# Patient Record
Sex: MALE | Race: WHITE | NOT HISPANIC OR LATINO | URBAN - NONMETROPOLITAN AREA
[De-identification: names, ages, dates, MRNs, and addresses within clinical notes are randomized per-mention and may not be internally consistent; named-entity substitution may affect disease eponyms.]

---

## 2017-01-11 RX ORDER — LISINOPRIL 10 MG/1
TABLET ORAL
Qty: 30 TABLET | Refills: 0 | Status: SHIPPED | OUTPATIENT
Start: 2017-01-11 | End: 2017-03-06 | Stop reason: SDUPTHER

## 2017-01-11 RX ORDER — ERGOCALCIFEROL 1.25 MG/1
CAPSULE ORAL
Qty: 4 CAPSULE | Refills: 0 | Status: SHIPPED | OUTPATIENT
Start: 2017-01-11 | End: 2017-03-06 | Stop reason: SDUPTHER

## 2017-03-06 RX ORDER — LISINOPRIL 10 MG/1
TABLET ORAL
Qty: 30 TABLET | Refills: 0 | Status: SHIPPED | OUTPATIENT
Start: 2017-03-06 | End: 2017-05-01

## 2017-03-06 RX ORDER — ERGOCALCIFEROL 1.25 MG/1
CAPSULE ORAL
Qty: 4 CAPSULE | Refills: 0 | Status: SHIPPED | OUTPATIENT
Start: 2017-03-06 | End: 2017-05-01 | Stop reason: SDUPTHER

## 2017-03-30 DIAGNOSIS — I10 ESSENTIAL HYPERTENSION: ICD-10-CM

## 2017-03-30 RX ORDER — LISINOPRIL 5 MG/1
5 TABLET ORAL DAILY
Qty: 30 TABLET | Refills: 1 | Status: SHIPPED | OUTPATIENT
Start: 2017-03-30 | End: 2017-05-01 | Stop reason: SDUPTHER

## 2017-03-30 RX ORDER — SIMVASTATIN 40 MG
40 TABLET ORAL NIGHTLY
Qty: 30 TABLET | Refills: 1 | Status: SHIPPED | OUTPATIENT
Start: 2017-03-30 | End: 2017-05-01 | Stop reason: SDUPTHER

## 2017-04-07 DIAGNOSIS — I10 ESSENTIAL HYPERTENSION: ICD-10-CM

## 2017-04-10 RX ORDER — ASPIRIN 325 MG
TABLET, DELAYED RELEASE (ENTERIC COATED) ORAL
Qty: 30 TABLET | Refills: 0 | Status: SHIPPED | OUTPATIENT
Start: 2017-04-10 | End: 2017-05-01 | Stop reason: SDUPTHER

## 2017-04-10 RX ORDER — ATENOLOL 25 MG/1
TABLET ORAL
Qty: 15 TABLET | Refills: 0 | Status: SHIPPED | OUTPATIENT
Start: 2017-04-10 | End: 2017-05-01 | Stop reason: SDUPTHER

## 2017-05-01 ENCOUNTER — OFFICE VISIT (OUTPATIENT)
Dept: FAMILY MEDICINE CLINIC | Facility: CLINIC | Age: 47
End: 2017-05-01

## 2017-05-01 VITALS
BODY MASS INDEX: 31.64 KG/M2 | DIASTOLIC BLOOD PRESSURE: 80 MMHG | HEIGHT: 70 IN | WEIGHT: 221 LBS | HEART RATE: 103 BPM | SYSTOLIC BLOOD PRESSURE: 122 MMHG | TEMPERATURE: 97.4 F | OXYGEN SATURATION: 98 %

## 2017-05-01 DIAGNOSIS — E78.2 MIXED HYPERLIPIDEMIA: ICD-10-CM

## 2017-05-01 DIAGNOSIS — F32.A DEPRESSION, UNSPECIFIED DEPRESSION TYPE: ICD-10-CM

## 2017-05-01 DIAGNOSIS — H61.20 EXCESSIVE CERUMEN IN EAR CANAL, UNSPECIFIED LATERALITY: ICD-10-CM

## 2017-05-01 DIAGNOSIS — Z29.9 PREVENTIVE MEASURE: ICD-10-CM

## 2017-05-01 DIAGNOSIS — E53.8 VITAMIN B 12 DEFICIENCY: ICD-10-CM

## 2017-05-01 DIAGNOSIS — Z13.9 SCREENING: ICD-10-CM

## 2017-05-01 DIAGNOSIS — E55.9 VITAMIN D DEFICIENCY DISEASE: ICD-10-CM

## 2017-05-01 DIAGNOSIS — I10 ESSENTIAL HYPERTENSION: Primary | ICD-10-CM

## 2017-05-01 LAB
25(OH)D3 SERPL-MCNC: 36 NG/ML
ALBUMIN SERPL-MCNC: 4.4 G/DL (ref 3.5–5)
ALBUMIN UR-MCNC: 7.6 MG/L
ALBUMIN/GLOB SERPL: 1.8 G/DL (ref 1.5–2.5)
ALP SERPL-CCNC: 87 U/L (ref 40–129)
ALT SERPL W P-5'-P-CCNC: 23 U/L (ref 10–44)
ANION GAP SERPL CALCULATED.3IONS-SCNC: 6.8 MMOL/L (ref 3.6–11.2)
AST SERPL-CCNC: 24 U/L (ref 10–34)
BILIRUB SERPL-MCNC: 0.2 MG/DL (ref 0.2–1.8)
BUN BLD-MCNC: 13 MG/DL (ref 7–21)
BUN/CREAT SERPL: 15.9 (ref 7–25)
CALCIUM SPEC-SCNC: 9.5 MG/DL (ref 7.7–10)
CHLORIDE SERPL-SCNC: 106 MMOL/L (ref 99–112)
CHOLEST SERPL-MCNC: 162 MG/DL (ref 0–200)
CO2 SERPL-SCNC: 28.2 MMOL/L (ref 24.3–31.9)
CREAT BLD-MCNC: 0.82 MG/DL (ref 0.43–1.29)
GFR SERPL CREATININE-BSD FRML MDRD: 100 ML/MIN/1.73
GLOBULIN UR ELPH-MCNC: 2.4 GM/DL
GLUCOSE BLD-MCNC: 113 MG/DL (ref 70–110)
HBA1C MFR BLD: 5.8 % (ref 4.5–5.7)
HDLC SERPL-MCNC: 42 MG/DL (ref 60–100)
LDLC SERPL CALC-MCNC: 96 MG/DL (ref 0–100)
LDLC/HDLC SERPL: 2.3 {RATIO}
OSMOLALITY SERPL CALC.SUM OF ELEC: 282.2 MOSM/KG (ref 273–305)
POTASSIUM BLD-SCNC: 4.1 MMOL/L (ref 3.5–5.3)
PROT SERPL-MCNC: 6.8 G/DL (ref 6–8)
SODIUM BLD-SCNC: 141 MMOL/L (ref 135–153)
TRIGL SERPL-MCNC: 118 MG/DL (ref 0–150)
TSH SERPL DL<=0.05 MIU/L-ACNC: 1.58 MIU/ML (ref 0.55–4.78)
VIT B12 BLD-MCNC: 292 PG/ML (ref 211–911)
VLDLC SERPL-MCNC: 23.6 MG/DL

## 2017-05-01 PROCEDURE — 99214 OFFICE O/P EST MOD 30 MIN: CPT | Performed by: NURSE PRACTITIONER

## 2017-05-01 PROCEDURE — 82306 VITAMIN D 25 HYDROXY: CPT | Performed by: NURSE PRACTITIONER

## 2017-05-01 PROCEDURE — 83036 HEMOGLOBIN GLYCOSYLATED A1C: CPT | Performed by: NURSE PRACTITIONER

## 2017-05-01 PROCEDURE — 69210 REMOVE IMPACTED EAR WAX UNI: CPT | Performed by: NURSE PRACTITIONER

## 2017-05-01 PROCEDURE — G0008 ADMIN INFLUENZA VIRUS VAC: HCPCS | Performed by: NURSE PRACTITIONER

## 2017-05-01 PROCEDURE — 82607 VITAMIN B-12: CPT | Performed by: NURSE PRACTITIONER

## 2017-05-01 PROCEDURE — 80053 COMPREHEN METABOLIC PANEL: CPT | Performed by: NURSE PRACTITIONER

## 2017-05-01 PROCEDURE — 90715 TDAP VACCINE 7 YRS/> IM: CPT | Performed by: NURSE PRACTITIONER

## 2017-05-01 PROCEDURE — 36415 COLL VENOUS BLD VENIPUNCTURE: CPT | Performed by: NURSE PRACTITIONER

## 2017-05-01 PROCEDURE — 84443 ASSAY THYROID STIM HORMONE: CPT | Performed by: NURSE PRACTITIONER

## 2017-05-01 PROCEDURE — 80061 LIPID PANEL: CPT | Performed by: NURSE PRACTITIONER

## 2017-05-01 PROCEDURE — 82043 UR ALBUMIN QUANTITATIVE: CPT | Performed by: NURSE PRACTITIONER

## 2017-05-01 RX ORDER — ERGOCALCIFEROL 1.25 MG/1
50000 CAPSULE ORAL
Qty: 4 CAPSULE | Refills: 5 | Status: SHIPPED | OUTPATIENT
Start: 2017-05-01 | End: 2017-11-04 | Stop reason: SDUPTHER

## 2017-05-01 RX ORDER — LISINOPRIL 5 MG/1
5 TABLET ORAL DAILY
Qty: 30 TABLET | Refills: 5 | Status: SHIPPED | OUTPATIENT
Start: 2017-05-01 | End: 2017-11-06 | Stop reason: SDUPTHER

## 2017-05-01 RX ORDER — ATENOLOL 25 MG/1
12.5 TABLET ORAL DAILY
Qty: 15 TABLET | Refills: 5 | Status: SHIPPED | OUTPATIENT
Start: 2017-05-01 | End: 2017-11-06 | Stop reason: SDUPTHER

## 2017-05-01 RX ORDER — SIMVASTATIN 40 MG
40 TABLET ORAL NIGHTLY
Qty: 30 TABLET | Refills: 5 | Status: SHIPPED | OUTPATIENT
Start: 2017-05-01 | End: 2017-11-06 | Stop reason: SDUPTHER

## 2017-05-01 RX ORDER — ASPIRIN 325 MG
325 TABLET, DELAYED RELEASE (ENTERIC COATED) ORAL DAILY
Qty: 30 TABLET | Refills: 5 | Status: SHIPPED | OUTPATIENT
Start: 2017-05-01 | End: 2017-11-04 | Stop reason: SDUPTHER

## 2017-09-07 DIAGNOSIS — E11.9 DIABETES MELLITUS WITHOUT COMPLICATION (HCC): Primary | ICD-10-CM

## 2017-11-06 ENCOUNTER — OFFICE VISIT (OUTPATIENT)
Dept: FAMILY MEDICINE CLINIC | Facility: CLINIC | Age: 47
End: 2017-11-06

## 2017-11-06 VITALS
WEIGHT: 220 LBS | BODY MASS INDEX: 31.5 KG/M2 | SYSTOLIC BLOOD PRESSURE: 130 MMHG | HEIGHT: 70 IN | TEMPERATURE: 98.1 F | DIASTOLIC BLOOD PRESSURE: 90 MMHG | HEART RATE: 124 BPM | OXYGEN SATURATION: 96 %

## 2017-11-06 DIAGNOSIS — F41.9 ANXIETY DISORDER, UNSPECIFIED TYPE: ICD-10-CM

## 2017-11-06 DIAGNOSIS — I10 ESSENTIAL HYPERTENSION: ICD-10-CM

## 2017-11-06 DIAGNOSIS — F20.0 PARANOID SCHIZOPHRENIA (HCC): ICD-10-CM

## 2017-11-06 DIAGNOSIS — J06.9 ACUTE URI: ICD-10-CM

## 2017-11-06 DIAGNOSIS — E11.9 TYPE 2 DIABETES MELLITUS WITHOUT COMPLICATION, WITHOUT LONG-TERM CURRENT USE OF INSULIN (HCC): Primary | ICD-10-CM

## 2017-11-06 DIAGNOSIS — E55.9 VITAMIN D DEFICIENCY DISEASE: ICD-10-CM

## 2017-11-06 DIAGNOSIS — E78.2 MIXED HYPERLIPIDEMIA: ICD-10-CM

## 2017-11-06 DIAGNOSIS — F32.A DEPRESSION, UNSPECIFIED DEPRESSION TYPE: ICD-10-CM

## 2017-11-06 LAB
25(OH)D3 SERPL-MCNC: 70 NG/ML
ALBUMIN SERPL-MCNC: 4.9 G/DL (ref 3.5–5)
ALBUMIN/GLOB SERPL: 1.6 G/DL (ref 1.5–2.5)
ALP SERPL-CCNC: 120 U/L (ref 40–129)
ALT SERPL W P-5'-P-CCNC: 18 U/L (ref 10–44)
ANION GAP SERPL CALCULATED.3IONS-SCNC: 8.5 MMOL/L (ref 3.6–11.2)
AST SERPL-CCNC: 18 U/L (ref 10–34)
BASOPHILS # BLD AUTO: 0.02 10*3/MM3 (ref 0–0.3)
BASOPHILS NFR BLD AUTO: 0.2 % (ref 0–2)
BILIRUB SERPL-MCNC: 0.3 MG/DL (ref 0.2–1.8)
BUN BLD-MCNC: 13 MG/DL (ref 7–21)
BUN/CREAT SERPL: 14.9 (ref 7–25)
CALCIUM SPEC-SCNC: 10.1 MG/DL (ref 7.7–10)
CHLORIDE SERPL-SCNC: 104 MMOL/L (ref 99–112)
CHOLEST SERPL-MCNC: 157 MG/DL (ref 0–200)
CO2 SERPL-SCNC: 24.5 MMOL/L (ref 24.3–31.9)
CREAT BLD-MCNC: 0.87 MG/DL (ref 0.43–1.29)
DEPRECATED RDW RBC AUTO: 36.5 FL (ref 37–54)
EOSINOPHIL # BLD AUTO: 0.16 10*3/MM3 (ref 0–0.7)
EOSINOPHIL NFR BLD AUTO: 1.8 % (ref 0–5)
ERYTHROCYTE [DISTWIDTH] IN BLOOD BY AUTOMATED COUNT: 12.5 % (ref 11.5–14.5)
GFR SERPL CREATININE-BSD FRML MDRD: 93 ML/MIN/1.73
GLOBULIN UR ELPH-MCNC: 3 GM/DL
GLUCOSE BLD-MCNC: 114 MG/DL (ref 70–110)
HBA1C MFR BLD: 5.9 % (ref 4.5–5.7)
HCT VFR BLD AUTO: 43.9 % (ref 42–52)
HDLC SERPL-MCNC: 43 MG/DL (ref 60–100)
HGB BLD-MCNC: 15.7 G/DL (ref 14–18)
IMM GRANULOCYTES # BLD: 0.01 10*3/MM3 (ref 0–0.03)
IMM GRANULOCYTES NFR BLD: 0.1 % (ref 0–0.5)
LDLC SERPL CALC-MCNC: 94 MG/DL (ref 0–100)
LDLC/HDLC SERPL: 2.18 {RATIO}
LYMPHOCYTES # BLD AUTO: 1.82 10*3/MM3 (ref 1–3)
LYMPHOCYTES NFR BLD AUTO: 20.4 % (ref 21–51)
MCH RBC QN AUTO: 28.9 PG (ref 27–33)
MCHC RBC AUTO-ENTMCNC: 35.8 G/DL (ref 33–37)
MCV RBC AUTO: 80.8 FL (ref 80–94)
MONOCYTES # BLD AUTO: 0.58 10*3/MM3 (ref 0.1–0.9)
MONOCYTES NFR BLD AUTO: 6.5 % (ref 0–10)
NEUTROPHILS # BLD AUTO: 6.31 10*3/MM3 (ref 1.4–6.5)
NEUTROPHILS NFR BLD AUTO: 71 % (ref 30–70)
OSMOLALITY SERPL CALC.SUM OF ELEC: 274.8 MOSM/KG (ref 273–305)
PLATELET # BLD AUTO: 317 10*3/MM3 (ref 130–400)
PMV BLD AUTO: 9.2 FL (ref 6–10)
POTASSIUM BLD-SCNC: 4.3 MMOL/L (ref 3.5–5.3)
PROT SERPL-MCNC: 7.9 G/DL (ref 6–8)
RBC # BLD AUTO: 5.43 10*6/MM3 (ref 4.7–6.1)
SODIUM BLD-SCNC: 137 MMOL/L (ref 135–153)
TRIGL SERPL-MCNC: 101 MG/DL (ref 0–150)
TSH SERPL DL<=0.05 MIU/L-ACNC: 1.51 MIU/ML (ref 0.55–4.78)
VIT B12 BLD-MCNC: 470 PG/ML (ref 211–911)
VLDLC SERPL-MCNC: 20.2 MG/DL
WBC NRBC COR # BLD: 8.9 10*3/MM3 (ref 4.5–12.5)

## 2017-11-06 PROCEDURE — 96372 THER/PROPH/DIAG INJ SC/IM: CPT | Performed by: NURSE PRACTITIONER

## 2017-11-06 PROCEDURE — 83036 HEMOGLOBIN GLYCOSYLATED A1C: CPT | Performed by: NURSE PRACTITIONER

## 2017-11-06 PROCEDURE — 82607 VITAMIN B-12: CPT | Performed by: NURSE PRACTITIONER

## 2017-11-06 PROCEDURE — 36415 COLL VENOUS BLD VENIPUNCTURE: CPT | Performed by: NURSE PRACTITIONER

## 2017-11-06 PROCEDURE — 80061 LIPID PANEL: CPT | Performed by: NURSE PRACTITIONER

## 2017-11-06 PROCEDURE — 85025 COMPLETE CBC W/AUTO DIFF WBC: CPT | Performed by: NURSE PRACTITIONER

## 2017-11-06 PROCEDURE — 99214 OFFICE O/P EST MOD 30 MIN: CPT | Performed by: NURSE PRACTITIONER

## 2017-11-06 PROCEDURE — 80053 COMPREHEN METABOLIC PANEL: CPT | Performed by: NURSE PRACTITIONER

## 2017-11-06 PROCEDURE — 84443 ASSAY THYROID STIM HORMONE: CPT | Performed by: NURSE PRACTITIONER

## 2017-11-06 PROCEDURE — 82306 VITAMIN D 25 HYDROXY: CPT | Performed by: NURSE PRACTITIONER

## 2017-11-06 RX ORDER — SIMVASTATIN 40 MG
40 TABLET ORAL NIGHTLY
Qty: 30 TABLET | Refills: 5 | Status: SHIPPED | OUTPATIENT
Start: 2017-11-06 | End: 2018-05-08 | Stop reason: SDUPTHER

## 2017-11-06 RX ORDER — GUAIFENESIN/DEXTROMETHORPHAN 100-10MG/5
5 SYRUP ORAL 3 TIMES DAILY PRN
Qty: 236 ML | Refills: 0 | Status: SHIPPED | OUTPATIENT
Start: 2017-11-06 | End: 2017-12-06

## 2017-11-06 RX ORDER — ASPIRIN 325 MG
TABLET, DELAYED RELEASE (ENTERIC COATED) ORAL
Qty: 30 TABLET | Refills: 0 | Status: SHIPPED | OUTPATIENT
Start: 2017-11-06 | End: 2017-11-06 | Stop reason: SDUPTHER

## 2017-11-06 RX ORDER — ATENOLOL 25 MG/1
12.5 TABLET ORAL DAILY
Qty: 15 TABLET | Refills: 5 | Status: SHIPPED | OUTPATIENT
Start: 2017-11-06 | End: 2018-05-08 | Stop reason: SDUPTHER

## 2017-11-06 RX ORDER — CEFTRIAXONE 1 G/1
1 INJECTION, POWDER, FOR SOLUTION INTRAMUSCULAR; INTRAVENOUS EVERY 24 HOURS
Status: DISCONTINUED | OUTPATIENT
Start: 2017-11-06 | End: 2017-12-06

## 2017-11-06 RX ORDER — LISINOPRIL 5 MG/1
5 TABLET ORAL DAILY
Qty: 30 TABLET | Refills: 5 | Status: SHIPPED | OUTPATIENT
Start: 2017-11-06 | End: 2018-05-08 | Stop reason: SDUPTHER

## 2017-11-06 RX ORDER — ERGOCALCIFEROL 1.25 MG/1
CAPSULE ORAL
Qty: 4 CAPSULE | Refills: 0 | Status: SHIPPED | OUTPATIENT
Start: 2017-11-06 | End: 2017-11-06 | Stop reason: SDUPTHER

## 2017-11-06 RX ORDER — ERGOCALCIFEROL 1.25 MG/1
50000 CAPSULE ORAL
Qty: 4 CAPSULE | Refills: 5 | Status: SHIPPED | OUTPATIENT
Start: 2017-11-06 | End: 2018-05-08 | Stop reason: SDUPTHER

## 2017-11-06 RX ORDER — AZITHROMYCIN 250 MG/1
TABLET, FILM COATED ORAL
Qty: 6 TABLET | Refills: 0 | Status: SHIPPED | OUTPATIENT
Start: 2017-11-06 | End: 2017-12-06

## 2017-11-06 RX ORDER — ASPIRIN 325 MG
325 TABLET, DELAYED RELEASE (ENTERIC COATED) ORAL DAILY
Qty: 30 TABLET | Refills: 5 | Status: SHIPPED | OUTPATIENT
Start: 2017-11-06 | End: 2018-05-08 | Stop reason: SDUPTHER

## 2017-11-06 RX ADMIN — CEFTRIAXONE 1 G: 1 INJECTION, POWDER, FOR SOLUTION INTRAMUSCULAR; INTRAVENOUS at 11:08

## 2017-11-06 NOTE — PROGRESS NOTES
Subjective   Alfonso Barnett is a 47 y.o. male.     Chief Complaint   Patient presents with   • Hypertension   • Med Refill   • Hyperlipidemia   • URI       History of Present Illness     Hypertension-patient monitors his blood pressure randomly in the community.  He denies any symptoms of hypertensive episodes.  Receives lisinopril 5 mg daily and atenolol 25 mg one half tablet daily.    Hyperlipidemia-patient has been able to maintain his weight of 220 pounds.  He does try to exercise by walking when the weather permits.  He does pay attention to the fat content in his diet.  Currently taking Zocor 40 mg each night, denies any side effects.    Patient does take a daily aspirin.  He denies any abnormal bleeding.    Vitamin D def - patient currently takes a weekly supplement.    Type 2 diabetes without convocation-patient controls his type 2 diabetes with diet and lifestyle.  He denies any symptomatic high hypoglycemia or hyperglycemia.    Paranoid schizophrenia with history of anxiety and depression-patient is under the care of the local [facility] for mental health services.  He denies any recent exacerbation of symptoms.    URI - patient has URI symptoms ×1 week.  Low-grade temp, chills and fatigue.  Sinus pressure and thick nasal discharge.    The following portions of the patient's history were reviewed and updated as appropriate: allergies, current medications, past family history, past medical history, past social history, past surgical history and problem list.    Review of Systems   Constitutional: Positive for appetite change, chills, fatigue and fever. Negative for activity change and unexpected weight change.   HENT: Positive for congestion and sinus pressure.    Eyes: Negative.    Respiratory: Positive for cough. Negative for choking, chest tightness, shortness of breath and wheezing.    Cardiovascular: Negative for chest pain and palpitations.   Gastrointestinal: Negative for abdominal pain, blood  "in stool, constipation, diarrhea, nausea and vomiting.   Endocrine: Negative.    Genitourinary: Negative for dysuria and frequency.   Musculoskeletal: Positive for arthralgias. Negative for neck pain and neck stiffness.   Skin: Negative for rash.   Allergic/Immunologic: Negative.    Neurological: Negative.    Hematological: Negative.    Psychiatric/Behavioral: Negative for dysphoric mood, self-injury and suicidal ideas.       Objective     /90 (BP Location: Left arm, Patient Position: Sitting, Cuff Size: Adult)  Pulse (!) 124  Temp 98.1 °F (36.7 °C) (Tympanic)   Ht 70\" (177.8 cm)  Wt 220 lb (99.8 kg)  SpO2 96%  BMI 31.57 kg/m2  Office Visit on 05/01/2017   Component Date Value Ref Range Status   • Glucose 05/01/2017 113* 70 - 110 mg/dL Final   • BUN 05/01/2017 13  7 - 21 mg/dL Final   • Creatinine 05/01/2017 0.82  0.43 - 1.29 mg/dL Final   • Sodium 05/01/2017 141  135 - 153 mmol/L Final   • Potassium 05/01/2017 4.1  3.5 - 5.3 mmol/L Final   • Chloride 05/01/2017 106  99 - 112 mmol/L Final   • CO2 05/01/2017 28.2  24.3 - 31.9 mmol/L Final   • Calcium 05/01/2017 9.5  7.7 - 10.0 mg/dL Final   • Total Protein 05/01/2017 6.8  6.0 - 8.0 g/dL Final   • Albumin 05/01/2017 4.40  3.50 - 5.00 g/dL Final   • ALT (SGPT) 05/01/2017 23  10 - 44 U/L Final   • AST (SGOT) 05/01/2017 24  10 - 34 U/L Final   • Alkaline Phosphatase 05/01/2017 87  40 - 129 U/L Final   • Total Bilirubin 05/01/2017 0.2  0.2 - 1.8 mg/dL Final   • eGFR Non African Amer 05/01/2017 100  >60 mL/min/1.73 Final   • Globulin 05/01/2017 2.4  gm/dL Final   • A/G Ratio 05/01/2017 1.8  1.5 - 2.5 g/dL Final   • BUN/Creatinine Ratio 05/01/2017 15.9  7.0 - 25.0 Final   • Anion Gap 05/01/2017 6.8  3.6 - 11.2 mmol/L Final   • TSH 05/01/2017 1.584  0.550 - 4.780 mIU/mL Final   • Hemoglobin A1C 05/01/2017 5.80* 4.50 - 5.70 % Final   • 25 Hydroxy, Vitamin D 05/01/2017 36.0  ng/ml Final   • Microalbumin, Urine 05/01/2017 7.6  mg/L Final   • Total Cholesterol " 05/01/2017 162  0 - 200 mg/dL Final   • Triglycerides 05/01/2017 118  0 - 150 mg/dL Final   • HDL Cholesterol 05/01/2017 42* 60 - 100 mg/dL Final   • LDL Cholesterol  05/01/2017 96  0 - 100 mg/dL Final   • VLDL Cholesterol 05/01/2017 23.6  mg/dL Final   • LDL/HDL Ratio 05/01/2017 2.30   Final   • Vitamin B-12 05/01/2017 292  211 - 911 pg/mL Final   • Osmolality Calc 05/01/2017 282.2  273.0 - 305.0 mOsm/kg Final       Physical Exam   Constitutional: He is oriented to person, place, and time. He appears well-developed and well-nourished. He has a sickly appearance. No distress.   Appears acutely ill.    HENT:   Head: Normocephalic.   Right Ear: Hearing normal. No lacerations. No drainage or swelling. No foreign bodies. No mastoid tenderness. A middle ear effusion is present.   Left Ear: Hearing normal. No lacerations. No drainage or swelling. No foreign bodies. No mastoid tenderness. A middle ear effusion is present.   Nose: Mucosal edema and rhinorrhea present. No nose lacerations, sinus tenderness or nasal deformity. No epistaxis.  No foreign bodies.   Mouth/Throat: Uvula is midline. Oropharyngeal exudate and posterior oropharyngeal erythema present. No tonsillar abscesses.   TM's are cloudy bilateral   Eyes: EOM are normal. Pupils are equal, round, and reactive to light.   Neck: Normal range of motion. Neck supple. No thyromegaly present.   Cardiovascular: Normal rate, regular rhythm, normal heart sounds and intact distal pulses.    Pulmonary/Chest: Effort normal and breath sounds normal. No respiratory distress.   Cough noted    Abdominal: Soft. Bowel sounds are normal. He exhibits no distension. There is no tenderness. There is no guarding.   Lymphadenopathy:     He has cervical adenopathy.        Right cervical: Superficial cervical adenopathy present. No deep cervical adenopathy present.       Left cervical: Superficial cervical adenopathy present. No deep cervical adenopathy present.   Neurological: He is  alert and oriented to person, place, and time. He has normal reflexes.   Skin: Skin is warm and dry. No rash noted. He is not diaphoretic.   Psychiatric: He has a normal mood and affect. His speech is normal and behavior is normal. Judgment and thought content normal. Cognition and memory are normal.   Vitals reviewed.      Assessment/Plan     Problem List Items Addressed This Visit        Cardiovascular and Mediastinum    Hyperlipidemia    Relevant Medications    simvastatin (ZOCOR) 40 MG tablet    Other Relevant Orders    Lipid Panel    Hypertension    Relevant Medications    lisinopril (PRINIVIL,ZESTRIL) 5 MG tablet    atenolol (TENORMIN) 25 MG tablet       Digestive    Vitamin D deficiency disease    Relevant Medications    vitamin D (ERGOCALCIFEROL) 55345 units capsule capsule    Other Relevant Orders    Vitamin D 25 Hydroxy       Endocrine    Type 2 diabetes mellitus without complication - Primary    Relevant Orders    CBC & Differential    Comprehensive Metabolic Panel    TSH    Hemoglobin A1c    Vitamin B12    CBC Auto Differential       Other    Paranoid schizophrenia    Depression    Anxiety disorder      Other Visit Diagnoses     Acute URI        Relevant Medications    azithromycin (ZITHROMAX Z-ROOAP) 250 MG tablet    cefTRIAXone (ROCEPHIN) injection 1 g (Start on 11/6/2017 10:30 AM)    guaifenesin-dextromethorphan (ROBITUSSIN DM) 100-10 MG/5ML syrup        Fluids, rest, symptomatic treatment advised. Discussed symptoms to report as well as reasons to seek urgent or emergent medical attention. Understanding stated.   Emotional support and active listening provided.   I have discussed diagnosis in detail today allowing time for questions and answers. Pt is aware of reasons to seek urgent or emergent medical care as well as reasons to return to the clinic for evaluation. Possible side effects, interactions and progression of symptoms discussed as well. Pt / family states understanding.   Medication list  reviewed. Discussed reasons for medication, possible side effects and interactions.   Follow up in one month for medicare wellness exam, every six months for routine visit. Labs at least twice yearly.              This document has been electronically signed by:  ADOLFO Valero, NP-C

## 2017-12-06 ENCOUNTER — OFFICE VISIT (OUTPATIENT)
Dept: FAMILY MEDICINE CLINIC | Facility: CLINIC | Age: 47
End: 2017-12-06

## 2017-12-06 VITALS
HEART RATE: 101 BPM | BODY MASS INDEX: 32.5 KG/M2 | WEIGHT: 227 LBS | SYSTOLIC BLOOD PRESSURE: 140 MMHG | HEIGHT: 70 IN | DIASTOLIC BLOOD PRESSURE: 80 MMHG | TEMPERATURE: 97 F | OXYGEN SATURATION: 99 %

## 2017-12-06 DIAGNOSIS — R39.11 URINARY HESITANCY: ICD-10-CM

## 2017-12-06 DIAGNOSIS — N52.2 DRUG-INDUCED ERECTILE DYSFUNCTION: ICD-10-CM

## 2017-12-06 DIAGNOSIS — R06.02 SHORTNESS OF BREATH: Primary | ICD-10-CM

## 2017-12-06 DIAGNOSIS — E55.9 VITAMIN D DEFICIENCY DISEASE: ICD-10-CM

## 2017-12-06 DIAGNOSIS — E11.9 TYPE 2 DIABETES MELLITUS WITHOUT COMPLICATION, WITHOUT LONG-TERM CURRENT USE OF INSULIN (HCC): ICD-10-CM

## 2017-12-06 DIAGNOSIS — I10 ESSENTIAL HYPERTENSION: ICD-10-CM

## 2017-12-06 DIAGNOSIS — H53.9 VISION CHANGES: ICD-10-CM

## 2017-12-06 DIAGNOSIS — E78.2 MIXED HYPERLIPIDEMIA: ICD-10-CM

## 2017-12-06 DIAGNOSIS — F20.0 PARANOID SCHIZOPHRENIA (HCC): ICD-10-CM

## 2017-12-06 DIAGNOSIS — F06.4 ANXIETY DISORDER DUE TO KNOWN PHYSIOLOGICAL CONDITION: ICD-10-CM

## 2017-12-06 PROCEDURE — G0438 PPPS, INITIAL VISIT: HCPCS | Performed by: NURSE PRACTITIONER

## 2017-12-06 RX ORDER — SILDENAFIL 50 MG/1
50 TABLET, FILM COATED ORAL DAILY PRN
Qty: 10 TABLET | Refills: 1 | Status: SHIPPED | OUTPATIENT
Start: 2017-12-06 | End: 2018-02-20 | Stop reason: SDUPTHER

## 2017-12-06 RX ORDER — OLANZAPINE 20 MG/1
TABLET ORAL
COMMUNITY
Start: 2017-11-04 | End: 2020-03-17 | Stop reason: SDUPTHER

## 2017-12-06 RX ORDER — ALBUTEROL SULFATE 90 UG/1
2 AEROSOL, METERED RESPIRATORY (INHALATION) EVERY 6 HOURS PRN
Qty: 1 INHALER | Refills: 5 | Status: SHIPPED | OUTPATIENT
Start: 2017-12-06 | End: 2018-05-08

## 2017-12-06 RX ORDER — AMITRIPTYLINE HYDROCHLORIDE 100 MG/1
TABLET, FILM COATED ORAL
COMMUNITY
Start: 2017-11-04 | End: 2018-11-08 | Stop reason: SDUPTHER

## 2017-12-06 NOTE — PROGRESS NOTES
QUICK REFERENCE INFORMATION:  The ABCs of the Annual Wellness Visit    Initial Medicare Wellness Visit    HEALTH RISK ASSESSMENT    1970    Recent Hospitalizations:  No hospitalization(s) within the last year..        Current Medical Providers:  Patient Care Team:  ADOLFO Clifton as PCP - General (Family Medicine)        Smoking Status:  History   Smoking Status   • Never Smoker   Smokeless Tobacco   • Never Used       Alcohol Consumption:  History   Alcohol Use No       Depression Screen:   No flowsheet data found.    Health Habits and Functional and Cognitive Screening:  Functional & Cognitive Status 12/6/2017   Do you have difficulty preparing food and eating? No   Do you have difficulty bathing yourself, getting dressed or grooming yourself? No   Do you have difficulty using the toilet? No   Do you have difficulty moving around from place to place? No   Do you have trouble with steps or getting out of a bed or a chair? No   In the past year have you fallen or experienced a near fall? No   Current Diet Low Fat Diet   Dental Exam Not up to date   Eye Exam Not up to date   Exercise (times per week) 3 times per week   Current Exercise Activities Include Weightlifting   Do you need help using the phone?  Yes   Are you deaf or do you have serious difficulty hearing?  No   Do you need help with transportation? Yes   Do you need help shopping? No   Do you need help preparing meals?  Yes   Do you need help with housework?  No   Do you need help with laundry? No   Do you need help taking your medications? No   Do you need help managing money? Yes   Have you felt unusual stress, anger or loneliness in the last month? Yes   Who do you live with? Child   If you need help, do you have trouble finding someone available to you? No   Have you been bothered in the last four weeks by sexual problems? No   Do you have difficulty concentrating, remembering or making decisions? Yes           Does the patient  have evidence of cognitive impairment? No    Asiprin use counseling: Taking ASA appropriately as indicated      Recent Lab Results:    Visual Acuity:   Visual Acuity Screening    Right eye Left eye Both eyes   Without correction: 20/25 20/30 20/25   With correction:        Whisper test adequate at 5 and 10 feet bilateral.       Age-appropriate Screening Schedule:  Refer to the list below for future screening recommendations based on patient's age, sex and/or medical conditions. Orders for these recommended tests are listed in the plan section. The patient has been provided with a written plan.    Health Maintenance   Topic Date Due   • PNEUMOCOCCAL VACCINE (19-64 MEDIUM RISK) (1 of 1 - PPSV23) 05/18/1987   • DXA SCAN  09/12/2016   • COLONOSCOPY  09/12/2016   • DIABETIC FOOT EXAM  09/26/2016   • DIABETIC EYE EXAM  09/26/2016   • PROSTATE CANCER SCREENING  03/23/2017   • URINE MICROALBUMIN  05/01/2018   • HEMOGLOBIN A1C  05/06/2018   • LIPID PANEL  11/06/2018   • TDAP/TD VACCINES (2 - Td) 05/01/2027   • INFLUENZA VACCINE  Addressed        Subjective   History of Present Illness    Alfonso Barnett is a 47 y.o. male who presents for an Annual Wellness Visit.    Acute complaint of erectile dysfunction when he is nervous.  Patient is under the care of [facility] and states that he currently receives Zyprexa and Elavil.  He attends counseling at least once every 3 months.  Denies any thoughts of hurting himself or others.  Patient states that his ex-wife is about to get out of shelter and he is nervous about the fact that they may try to have sex because at times when he is nervous that he has trouble obtaining an erection.  This has happened in the past.  Patient has been seen by urology with no abnormal findings.  Patient has noticed that this is worse within a few hours of taking his psychotropic medication.    Type 2 diabetes-patient has not been watching his diet as close as he had been in the past.  Patient states that  he is no longer walking.  He does take his cholesterol medicine and a daily aspirin.  Patient is on ACE inhibitor.    Hypertension-patient currently receives an ACE inhibitor.  He does monitor his blood pressure randomly with no elevations report.    Paranoid schizophrenia with depression and anxiety-patient denies exacerbation of symptoms.  He denies any abnormal follow-ups.  He does feel that his current medication controls his symptoms.  He is under the care of the [facility] for Behavior health and counseling.    Shortness of breath-patient states that he has not been exercising lately and has gained weight.  He noticed the other day when he was helping his brother-in-law do some physical activity that he was getting short of breath.  He does not get short of breath during his daily routine.  He has no cough.  He is an ex smoker but has not smoked in several years.  No recent chest x-ray.      The following portions of the patient's history were reviewed and updated as appropriate: allergies, current medications, past family history, past medical history, past social history, past surgical history and problem list.    Outpatient Medications Prior to Visit   Medication Sig Dispense Refill   • aspirin  MG tablet Take 1 tablet by mouth Daily. 30 tablet 5   • atenolol (TENORMIN) 25 MG tablet Take 0.5 tablets by mouth Daily. 15 tablet 5   • lisinopril (PRINIVIL,ZESTRIL) 5 MG tablet Take 1 tablet by mouth Daily. 30 tablet 5   • simvastatin (ZOCOR) 40 MG tablet Take 1 tablet by mouth Every Night. 30 tablet 5   • vitamin D (ERGOCALCIFEROL) 21107 units capsule capsule Take 1 capsule by mouth Every 7 (Seven) Days. 4 capsule 5   • azithromycin (ZITHROMAX Z-ROOPA) 250 MG tablet Take 2 tablets the first day, then 1 tablet daily for 4 days. 6 tablet 0   • guaifenesin-dextromethorphan (ROBITUSSIN DM) 100-10 MG/5ML syrup Take 5 mL by mouth 3 (Three) Times a Day As Needed for Cough or Congestion. 236 mL  0     Facility-Administered Medications Prior to Visit   Medication Dose Route Frequency Provider Last Rate Last Dose   • cefTRIAXone (ROCEPHIN) injection 1 g  1 g Intramuscular Q24H Carolina LuaADOLFO   1 g at 11/06/17 1108       Patient Active Problem List   Diagnosis   • Paranoid schizophrenia   • Depression   • Type 2 diabetes mellitus without complication   • Hyperlipidemia   • Hypertension   • Anxiety disorder   • Vitamin D deficiency disease   • Drug-induced erectile dysfunction   • Vision changes       Advance Care Planning:  has NO advance directive - not interested in additional information    Identification of Risk Factors:  Risk factors include: weight , unhealthy diet, cardiovascular risk, depression and vision limitations.    Review of Systems   Constitutional: Positive for activity change (not been exercising , he did walk daily ) and unexpected weight change (weight gain now that he is not exercising ). Negative for appetite change, chills, diaphoresis, fatigue and fever.   HENT: Negative.    Eyes: Positive for visual disturbance (pt has noticed some changes in his vision up close ).   Respiratory: Positive for shortness of breath (helped his brother in law do some physical work and became short of breath ). Negative for apnea, cough, choking, chest tightness, wheezing and stridor.    Endocrine: Negative.    Genitourinary: Negative.    Musculoskeletal: Positive for arthralgias. Negative for neck stiffness.   Allergic/Immunologic: Negative.    Neurological: Negative.    Hematological: Negative.    Psychiatric/Behavioral: Positive for sleep disturbance. Negative for dysphoric mood, self-injury and suicidal ideas. The patient is nervous/anxious.    All other systems reviewed and are negative.      Compared to one year ago, the patient feels his physical health is the same.  Compared to one year ago, the patient feels his mental health is the same.    Objective     Physical Exam  "  Constitutional: He is oriented to person, place, and time. He appears well-developed and well-nourished. No distress.   HENT:   Head: Normocephalic.   Right Ear: External ear normal.   Left Ear: External ear normal.   Nose: Nose normal.   Mouth/Throat: Oropharynx is clear and moist. No oropharyngeal exudate.   Eyes: Pupils are equal, round, and reactive to light.   Neck: Normal range of motion. Neck supple. No tracheal deviation present. No thyromegaly present.   Cardiovascular: Normal rate, regular rhythm and normal heart sounds.  Exam reveals no gallop and no friction rub.    No murmur heard.  Pulmonary/Chest: Effort normal and breath sounds normal. No respiratory distress. He has no wheezes. He has no rales. He exhibits no tenderness.   Abdominal: Soft. Bowel sounds are normal. He exhibits no distension and no mass. There is no tenderness. There is no rebound and no guarding.   Musculoskeletal: Normal range of motion.   Lymphadenopathy:     He has no cervical adenopathy.   Neurological: He is alert and oriented to person, place, and time. He has normal reflexes.   CN 2-12 grossly intact    Skin: Skin is warm and dry. No rash noted. He is not diaphoretic. No erythema.   Psychiatric: He has a normal mood and affect. His behavior is normal. Judgment and thought content normal.   Vitals reviewed.      Vitals:    12/06/17 1100   BP: 140/80   BP Location: Right arm   Patient Position: Sitting   Cuff Size: Adult   Pulse: 101   Temp: 97 °F (36.1 °C)   TempSrc: Tympanic   SpO2: 99%   Weight: 103 kg (227 lb)   Height: 177.8 cm (70\")       Body mass index is 32.57 kg/(m^2).  Discussed the patient's BMI with him. BMI is above normal parameters. Follow-up plan includes:  educational material, exercise counseling and nutrition counseling.    Assessment/Plan   Patient Self-Management and Personalized Health Advice  The patient has been provided with information about: diet, exercise, weight management, prevention of cardiac " or vascular disease, the relationship between weight and GERD, supplements and mental health concerns and preventive services including:   · Counseling for cardiovascular disease risk reduction, Diabetes screening, see lab orders, Exercise counseling provided, Influenza vaccine, Nutrition counseling provided, Pneumococcal vaccine , Prostate cancer screening discussed, TdaP vaccine, Zostavax vaccine (Herpes Zoster).    Visit Diagnoses:    ICD-10-CM ICD-9-CM   1. Shortness of breath R06.02 786.05   2. Mixed hyperlipidemia E78.2 272.2   3. Essential hypertension I10 401.9   4. Vitamin D deficiency disease E55.9 268.9   5. Type 2 diabetes mellitus without complication, without long-term current use of insulin E11.9 250.00   6. Paranoid schizophrenia F20.0 295.30   7. Anxiety disorder due to known physiological condition F06.4 300.00   8. Drug-induced erectile dysfunction N52.2 607.84     E980.5   9. Vision changes H53.9 368.9   10. Urinary hesitancy R39.11 788.64       Orders Placed This Encounter   Procedures   • XR Chest PA & Lateral     Standing Status:   Future     Standing Expiration Date:   12/6/2018     Order Specific Question:   Reason for Exam:     Answer:   shortness of breath   • Comprehensive Metabolic Panel     Standing Status:   Future     Standing Expiration Date:   12/7/2018   • TSH     Standing Status:   Future     Standing Expiration Date:   12/6/2018   • Hemoglobin A1c     Standing Status:   Future     Standing Expiration Date:   12/6/2018   • Vitamin D 25 Hydroxy     Standing Status:   Future     Standing Expiration Date:   12/6/2018   • MicroAlbumin, Urine, Random - Urine, Clean Catch     Standing Status:   Future     Standing Expiration Date:   12/6/2018   • Lipid Panel     Standing Status:   Future     Standing Expiration Date:   12/6/2018   • Vitamin B12     Standing Status:   Future     Standing Expiration Date:   12/6/2018   • Ambulatory Referral to Optometry     Referral Priority:   Routine      Referral Type:   Consultation     Referral Reason:   Specialty Services Required     Referred to Provider:   Aster Lazaro OD     Requested Specialty:   Optometry     Number of Visits Requested:   1   • CBC & Differential     Standing Status:   Future     Standing Expiration Date:   12/6/2018     Order Specific Question:   Manual Differential     Answer:   No       Outpatient Encounter Prescriptions as of 12/6/2017   Medication Sig Dispense Refill   • aspirin  MG tablet Take 1 tablet by mouth Daily. 30 tablet 5   • atenolol (TENORMIN) 25 MG tablet Take 0.5 tablets by mouth Daily. 15 tablet 5   • lisinopril (PRINIVIL,ZESTRIL) 5 MG tablet Take 1 tablet by mouth Daily. 30 tablet 5   • simvastatin (ZOCOR) 40 MG tablet Take 1 tablet by mouth Every Night. 30 tablet 5   • vitamin D (ERGOCALCIFEROL) 32025 units capsule capsule Take 1 capsule by mouth Every 7 (Seven) Days. 4 capsule 5   • albuterol (PROVENTIL HFA;VENTOLIN HFA) 108 (90 Base) MCG/ACT inhaler Inhale 2 puffs Every 6 (Six) Hours As Needed for Shortness of Air. 1 inhaler 5   • amitriptyline (ELAVIL) 100 MG tablet      • OLANZapine (zyPREXA) 20 MG tablet      • sildenafil (VIAGRA) 50 MG tablet Take 1 tablet by mouth Daily As Needed for erectile dysfunction. 10 tablet 1   • [DISCONTINUED] azithromycin (ZITHROMAX Z-ROOPA) 250 MG tablet Take 2 tablets the first day, then 1 tablet daily for 4 days. 6 tablet 0   • [DISCONTINUED] guaifenesin-dextromethorphan (ROBITUSSIN DM) 100-10 MG/5ML syrup Take 5 mL by mouth 3 (Three) Times a Day As Needed for Cough or Congestion. 236 mL 0     Facility-Administered Encounter Medications as of 12/6/2017   Medication Dose Route Frequency Provider Last Rate Last Dose   • [DISCONTINUED] cefTRIAXone (ROCEPHIN) injection 1 g  1 g Intramuscular Q24H ADOLFO Clifton   1 g at 11/06/17 1108       Reviewed use of high risk medication in the elderly: yes  Reviewed for potential of harmful drug interactions in the  elderly: yes    Schedule optometry consult.  Have chest x-ray performed as ordered.  Discussed with patient the need to remain physical and lose weight which will help build stamina.  I have discussed diagnosis in detail today allowing time for questions and answers. Pt is aware of reasons to seek urgent or emergent medical care as well as reasons to return to the clinic for evaluation. Possible side effects, interactions and progression of symptoms discussed as well. Pt / family states understanding.   Emotional support and active listening provided.   Start Viagra 50 mg one by mouth every 24 hours when necessary #10 with no refill.  Patient has been seen by urology in the past.  [facility] of [facility].  Order has been provided for a screening chest x-ray.  Encouraged patient to exercise daily and lose weight.  Heart healthy diet has been discussed.  Refill routine medications.  I have reviewed labs from 11/06/2017 with the patient today.  He has been provided with an albuterol inhaler for when necessary use, discussed possible side effects and interactions.    Recommend patient follow up in 5 months, labs the week before.  Follow Up:  Return in about 5 months (around 5/8/2018) for Recheck, labs one week prior.     An After Visit Summary and PPPS with all of these plans were given to the patient.

## 2018-02-20 ENCOUNTER — OFFICE VISIT (OUTPATIENT)
Dept: FAMILY MEDICINE CLINIC | Facility: CLINIC | Age: 48
End: 2018-02-20

## 2018-02-20 VITALS
TEMPERATURE: 98.6 F | HEIGHT: 70 IN | HEART RATE: 100 BPM | BODY MASS INDEX: 32.5 KG/M2 | SYSTOLIC BLOOD PRESSURE: 130 MMHG | RESPIRATION RATE: 20 BRPM | OXYGEN SATURATION: 97 % | WEIGHT: 227 LBS | DIASTOLIC BLOOD PRESSURE: 80 MMHG

## 2018-02-20 DIAGNOSIS — F06.4 ANXIETY DISORDER DUE TO KNOWN PHYSIOLOGICAL CONDITION: ICD-10-CM

## 2018-02-20 DIAGNOSIS — E78.2 MIXED HYPERLIPIDEMIA: ICD-10-CM

## 2018-02-20 DIAGNOSIS — N52.2 DRUG-INDUCED ERECTILE DYSFUNCTION: Primary | ICD-10-CM

## 2018-02-20 DIAGNOSIS — F20.0 PARANOID SCHIZOPHRENIA (HCC): ICD-10-CM

## 2018-02-20 DIAGNOSIS — I10 ESSENTIAL HYPERTENSION: ICD-10-CM

## 2018-02-20 PROCEDURE — 99214 OFFICE O/P EST MOD 30 MIN: CPT | Performed by: NURSE PRACTITIONER

## 2018-02-20 RX ORDER — SILDENAFIL 50 MG/1
50 TABLET, FILM COATED ORAL DAILY PRN
Qty: 20 TABLET | Refills: 3 | Status: SHIPPED | OUTPATIENT
Start: 2018-02-20 | End: 2018-05-08 | Stop reason: SDUPTHER

## 2018-02-20 NOTE — PROGRESS NOTES
"Subjective   Alfonso Barnett is a 47 y.o. male.     Chief Complaint   Patient presents with   • Med Refill   • [facility]       History of Present Illness       Erectile dysfunction - needs refill on viagra. Pt denies any side effects and reports improved sexual function.    Paranoid schizophrenia/depression/anxiety - pt states that he feels stable and remains under the care of [facility] for therapy.    Hyperlipidemia-stable    Hypertension-stable    Vitamin D deficiency-stable    Type 2 diabetes-controlled with dietary changes and lifestyle modifications.      The following portions of the patient's history were reviewed and updated as appropriate: allergies, current medications, past family history, past medical history, past social history, past surgical history and problem list.    Review of Systems   Constitutional: Negative for activity change, appetite change, chills, fatigue and fever.   HENT: Negative for ear pain, facial swelling, hearing loss, sinus pressure, sore throat, trouble swallowing and voice change.    Eyes: Negative for pain, discharge and visual disturbance.   Respiratory: Negative for apnea, cough, chest tightness, shortness of breath and wheezing.    Cardiovascular: Negative for chest pain, palpitations and leg swelling.   Gastrointestinal: Negative for abdominal pain, blood in stool, constipation, diarrhea, nausea and vomiting.   Genitourinary: Negative for dysuria.   Musculoskeletal: Negative for arthralgias, back pain and neck stiffness.   Skin: Negative for color change.   Neurological: Negative for headaches.   Psychiatric/Behavioral: Negative for agitation, confusion, decreased concentration, dysphoric mood, sleep disturbance and suicidal ideas.       Objective     /80 (BP Location: Right leg, Patient Position: Sitting, Cuff Size: Adult)  Pulse 100  Temp 98.6 °F (37 °C) (Oral)   Resp 20  Ht 177.8 cm (70\")  Wt 103 kg (227 lb)  " SpO2 97%  BMI 32.57 kg/m2  Office Visit on 11/06/2017   Component Date Value Ref Range Status   • Glucose 11/06/2017 114* 70 - 110 mg/dL Final   • BUN 11/06/2017 13  7 - 21 mg/dL Final   • Creatinine 11/06/2017 0.87  0.43 - 1.29 mg/dL Final   • Sodium 11/06/2017 137  135 - 153 mmol/L Final   • Potassium 11/06/2017 4.3  3.5 - 5.3 mmol/L Final   • Chloride 11/06/2017 104  99 - 112 mmol/L Final   • CO2 11/06/2017 24.5  24.3 - 31.9 mmol/L Final   • Calcium 11/06/2017 10.1* 7.7 - 10.0 mg/dL Final   • Total Protein 11/06/2017 7.9  6.0 - 8.0 g/dL Final   • Albumin 11/06/2017 4.90  3.50 - 5.00 g/dL Final   • ALT (SGPT) 11/06/2017 18  10 - 44 U/L Final   • AST (SGOT) 11/06/2017 18  10 - 34 U/L Final   • Alkaline Phosphatase 11/06/2017 120  40 - 129 U/L Final   • Total Bilirubin 11/06/2017 0.3  0.2 - 1.8 mg/dL Final   • eGFR Non African Amer 11/06/2017 93  >60 mL/min/1.73 Final   • Globulin 11/06/2017 3.0  gm/dL Final   • A/G Ratio 11/06/2017 1.6  1.5 - 2.5 g/dL Final   • BUN/Creatinine Ratio 11/06/2017 14.9  7.0 - 25.0 Final   • Anion Gap 11/06/2017 8.5  3.6 - 11.2 mmol/L Final   • Total Cholesterol 11/06/2017 157  0 - 200 mg/dL Final   • Triglycerides 11/06/2017 101  0 - 150 mg/dL Final   • HDL Cholesterol 11/06/2017 43* 60 - 100 mg/dL Final   • LDL Cholesterol  11/06/2017 94  0 - 100 mg/dL Final   • VLDL Cholesterol 11/06/2017 20.2  mg/dL Final   • LDL/HDL Ratio 11/06/2017 2.18   Final   • TSH 11/06/2017 1.510  0.550 - 4.780 mIU/mL Final   • Hemoglobin A1C 11/06/2017 5.90* 4.50 - 5.70 % Final   • 25 Hydroxy, Vitamin D 11/06/2017 70.0  ng/ml Final   • Vitamin B-12 11/06/2017 470  211 - 911 pg/mL Final   • WBC 11/06/2017 8.90  4.50 - 12.50 10*3/mm3 Final   • RBC 11/06/2017 5.43  4.70 - 6.10 10*6/mm3 Final   • Hemoglobin 11/06/2017 15.7  14.0 - 18.0 g/dL Final   • Hematocrit 11/06/2017 43.9  42.0 - 52.0 % Final   • MCV 11/06/2017 80.8  80.0 - 94.0 fL Final   • MCH 11/06/2017 28.9  27.0 - 33.0 pg Final   • MCHC 11/06/2017  35.8  33.0 - 37.0 g/dL Final   • RDW 11/06/2017 12.5  11.5 - 14.5 % Final   • RDW-SD 11/06/2017 36.5* 37.0 - 54.0 fl Final   • MPV 11/06/2017 9.2  6.0 - 10.0 fL Final   • Platelets 11/06/2017 317  130 - 400 10*3/mm3 Final   • Neutrophil % 11/06/2017 71.0* 30.0 - 70.0 % Final   • Lymphocyte % 11/06/2017 20.4* 21.0 - 51.0 % Final   • Monocyte % 11/06/2017 6.5  0.0 - 10.0 % Final   • Eosinophil % 11/06/2017 1.8  0.0 - 5.0 % Final   • Basophil % 11/06/2017 0.2  0.0 - 2.0 % Final   • Immature Grans % 11/06/2017 0.1  0.0 - 0.5 % Final   • Neutrophils, Absolute 11/06/2017 6.31  1.40 - 6.50 10*3/mm3 Final   • Lymphocytes, Absolute 11/06/2017 1.82  1.00 - 3.00 10*3/mm3 Final   • Monocytes, Absolute 11/06/2017 0.58  0.10 - 0.90 10*3/mm3 Final   • Eosinophils, Absolute 11/06/2017 0.16  0.00 - 0.70 10*3/mm3 Final   • Basophils, Absolute 11/06/2017 0.02  0.00 - 0.30 10*3/mm3 Final   • Immature Grans, Absolute 11/06/2017 0.01  0.00 - 0.03 10*3/mm3 Final   • Osmolality Calc 11/06/2017 274.8  273.0 - 305.0 mOsm/kg Final       Physical Exam   Constitutional: He is oriented to person, place, and time. He appears well-developed and well-nourished.   HENT:   Head: Normocephalic.   Right Ear: External ear normal.   Left Ear: External ear normal.   Nose: Nose normal.   Mouth/Throat: Oropharynx is clear and moist.   Eyes: Pupils are equal, round, and reactive to light.   Neck: Normal range of motion. Neck supple. No tracheal deviation present. No thyromegaly present.   Cardiovascular: Normal rate, regular rhythm and normal heart sounds.  Exam reveals no gallop and no friction rub.    No murmur heard.  Pulmonary/Chest: Effort normal and breath sounds normal. No respiratory distress. He has no wheezes. He has no rales. He exhibits no tenderness.   Abdominal: Soft. Bowel sounds are normal. He exhibits no distension and no mass. There is no tenderness. There is no rebound and no guarding.   Musculoskeletal: Normal range of motion.    Neurological: He is alert and oriented to person, place, and time. He has normal reflexes.   CN 2-12 grossly intact    Skin: Skin is warm and dry. No rash noted. No erythema.   Psychiatric: He has a normal mood and affect. His speech is normal and behavior is normal. Judgment and thought content normal. Cognition and memory are normal.       Assessment/Plan     Problem List Items Addressed This Visit        Cardiovascular and Mediastinum    Hyperlipidemia    Hypertension       Other    Paranoid schizophrenia    Anxiety disorder    Drug-induced erectile dysfunction - Primary    Overview     Due to psychotropic medication           Relevant Medications    sildenafil (VIAGRA) 50 MG tablet          [facility] medical physical assessment completed today.   Refill Viagra, reviewed possible side effects and reasons to seek emergent medical attention.   Copy of med list and recent labs attached to physical form and reviewed with patient.   Remain under the care of [facility].   I have discussed diagnosis in detail today allowing time for questions and answers. Pt is aware of reasons to seek urgent or emergent medical care as well as reasons to return to the clinic for evaluation. Possible side effects, interactions and progression of symptoms discussed as well. Pt / family states understanding.   Emotional support and active listening provided.   Follow up every 3-6 months, sooner if needed.            This document has been electronically signed by:  ADOLFO Valero, NP-C

## 2018-05-01 ENCOUNTER — LAB (OUTPATIENT)
Dept: FAMILY MEDICINE CLINIC | Facility: CLINIC | Age: 48
End: 2018-05-01

## 2018-05-01 DIAGNOSIS — E11.9 TYPE 2 DIABETES MELLITUS WITHOUT COMPLICATION, WITHOUT LONG-TERM CURRENT USE OF INSULIN (HCC): ICD-10-CM

## 2018-05-01 DIAGNOSIS — E78.2 MIXED HYPERLIPIDEMIA: ICD-10-CM

## 2018-05-01 DIAGNOSIS — I10 ESSENTIAL HYPERTENSION: ICD-10-CM

## 2018-05-01 DIAGNOSIS — E55.9 VITAMIN D DEFICIENCY DISEASE: ICD-10-CM

## 2018-05-01 LAB
25(OH)D3 SERPL-MCNC: 42 NG/ML
ALBUMIN SERPL-MCNC: 4.5 G/DL (ref 3.5–5)
ALBUMIN UR-MCNC: 6.8 MG/L
ALBUMIN/GLOB SERPL: 1.7 G/DL (ref 1.5–2.5)
ALP SERPL-CCNC: 99 U/L (ref 40–129)
ALT SERPL W P-5'-P-CCNC: 31 U/L (ref 10–44)
ANION GAP SERPL CALCULATED.3IONS-SCNC: 7 MMOL/L (ref 3.6–11.2)
AST SERPL-CCNC: 30 U/L (ref 10–34)
BASOPHILS # BLD AUTO: 0.06 10*3/MM3 (ref 0–0.3)
BASOPHILS NFR BLD AUTO: 1.1 % (ref 0–2)
BILIRUB SERPL-MCNC: 0.2 MG/DL (ref 0.2–1.8)
BUN BLD-MCNC: 10 MG/DL (ref 7–21)
BUN/CREAT SERPL: 12 (ref 7–25)
CALCIUM SPEC-SCNC: 9.5 MG/DL (ref 7.7–10)
CHLORIDE SERPL-SCNC: 102 MMOL/L (ref 99–112)
CHOLEST SERPL-MCNC: 160 MG/DL (ref 0–200)
CO2 SERPL-SCNC: 26 MMOL/L (ref 24.3–31.9)
CREAT BLD-MCNC: 0.83 MG/DL (ref 0.43–1.29)
DEPRECATED RDW RBC AUTO: 37.5 FL (ref 37–54)
EOSINOPHIL # BLD AUTO: 0.19 10*3/MM3 (ref 0–0.7)
EOSINOPHIL NFR BLD AUTO: 3.4 % (ref 0–5)
ERYTHROCYTE [DISTWIDTH] IN BLOOD BY AUTOMATED COUNT: 12.8 % (ref 11.5–14.5)
GFR SERPL CREATININE-BSD FRML MDRD: 98 ML/MIN/1.73
GLOBULIN UR ELPH-MCNC: 2.6 GM/DL
GLUCOSE BLD-MCNC: 92 MG/DL (ref 70–110)
HBA1C MFR BLD: 5.6 % (ref 4.5–5.7)
HCT VFR BLD AUTO: 42 % (ref 42–52)
HDLC SERPL-MCNC: 42 MG/DL (ref 60–100)
HGB BLD-MCNC: 14.2 G/DL (ref 14–18)
IMM GRANULOCYTES # BLD: 0 10*3/MM3 (ref 0–0.03)
IMM GRANULOCYTES NFR BLD: 0 % (ref 0–0.5)
LDLC SERPL CALC-MCNC: 92 MG/DL (ref 0–100)
LDLC/HDLC SERPL: 2.18 {RATIO}
LYMPHOCYTES # BLD AUTO: 1.72 10*3/MM3 (ref 1–3)
LYMPHOCYTES NFR BLD AUTO: 30.4 % (ref 21–51)
MCH RBC QN AUTO: 27.6 PG (ref 27–33)
MCHC RBC AUTO-ENTMCNC: 33.8 G/DL (ref 33–37)
MCV RBC AUTO: 81.7 FL (ref 80–94)
MONOCYTES # BLD AUTO: 0.49 10*3/MM3 (ref 0.1–0.9)
MONOCYTES NFR BLD AUTO: 8.7 % (ref 0–10)
NEUTROPHILS # BLD AUTO: 3.2 10*3/MM3 (ref 1.4–6.5)
NEUTROPHILS NFR BLD AUTO: 56.4 % (ref 30–70)
OSMOLALITY SERPL CALC.SUM OF ELEC: 268.8 MOSM/KG (ref 273–305)
PLATELET # BLD AUTO: 309 10*3/MM3 (ref 130–400)
PMV BLD AUTO: 9.5 FL (ref 6–10)
POTASSIUM BLD-SCNC: 4.2 MMOL/L (ref 3.5–5.3)
PROT SERPL-MCNC: 7.1 G/DL (ref 6–8)
RBC # BLD AUTO: 5.14 10*6/MM3 (ref 4.7–6.1)
SODIUM BLD-SCNC: 135 MMOL/L (ref 135–153)
TRIGL SERPL-MCNC: 132 MG/DL (ref 0–150)
TSH SERPL DL<=0.05 MIU/L-ACNC: 1.29 MIU/ML (ref 0.55–4.78)
VIT B12 BLD-MCNC: 420 PG/ML (ref 211–911)
VLDLC SERPL-MCNC: 26.4 MG/DL
WBC NRBC COR # BLD: 5.66 10*3/MM3 (ref 4.5–12.5)

## 2018-05-01 PROCEDURE — 82043 UR ALBUMIN QUANTITATIVE: CPT | Performed by: NURSE PRACTITIONER

## 2018-05-01 PROCEDURE — 82306 VITAMIN D 25 HYDROXY: CPT | Performed by: NURSE PRACTITIONER

## 2018-05-01 PROCEDURE — 82607 VITAMIN B-12: CPT | Performed by: NURSE PRACTITIONER

## 2018-05-01 PROCEDURE — 85025 COMPLETE CBC W/AUTO DIFF WBC: CPT | Performed by: NURSE PRACTITIONER

## 2018-05-01 PROCEDURE — 84443 ASSAY THYROID STIM HORMONE: CPT | Performed by: NURSE PRACTITIONER

## 2018-05-01 PROCEDURE — 80053 COMPREHEN METABOLIC PANEL: CPT | Performed by: NURSE PRACTITIONER

## 2018-05-01 PROCEDURE — 80061 LIPID PANEL: CPT | Performed by: NURSE PRACTITIONER

## 2018-05-01 PROCEDURE — 83036 HEMOGLOBIN GLYCOSYLATED A1C: CPT | Performed by: NURSE PRACTITIONER

## 2018-05-01 PROCEDURE — 36415 COLL VENOUS BLD VENIPUNCTURE: CPT

## 2018-05-08 ENCOUNTER — OFFICE VISIT (OUTPATIENT)
Dept: FAMILY MEDICINE CLINIC | Facility: CLINIC | Age: 48
End: 2018-05-08

## 2018-05-08 VITALS
TEMPERATURE: 97.2 F | HEIGHT: 70 IN | WEIGHT: 221 LBS | BODY MASS INDEX: 31.64 KG/M2 | DIASTOLIC BLOOD PRESSURE: 82 MMHG | HEART RATE: 83 BPM | SYSTOLIC BLOOD PRESSURE: 130 MMHG | OXYGEN SATURATION: 98 %

## 2018-05-08 DIAGNOSIS — N42.9 PROSTATE DISORDER: ICD-10-CM

## 2018-05-08 DIAGNOSIS — E55.9 VITAMIN D DEFICIENCY DISEASE: ICD-10-CM

## 2018-05-08 DIAGNOSIS — Z23 ENCOUNTER FOR IMMUNIZATION: ICD-10-CM

## 2018-05-08 DIAGNOSIS — F20.0 PARANOID SCHIZOPHRENIA (HCC): Primary | ICD-10-CM

## 2018-05-08 DIAGNOSIS — Z29.9 PREVENTIVE MEASURE: ICD-10-CM

## 2018-05-08 DIAGNOSIS — N52.2 DRUG-INDUCED ERECTILE DYSFUNCTION: ICD-10-CM

## 2018-05-08 DIAGNOSIS — I10 ESSENTIAL HYPERTENSION: ICD-10-CM

## 2018-05-08 DIAGNOSIS — Z12.5 SCREENING FOR PROSTATE CANCER: ICD-10-CM

## 2018-05-08 DIAGNOSIS — E78.2 MIXED HYPERLIPIDEMIA: ICD-10-CM

## 2018-05-08 DIAGNOSIS — E11.9 TYPE 2 DIABETES MELLITUS WITHOUT COMPLICATION, WITHOUT LONG-TERM CURRENT USE OF INSULIN (HCC): ICD-10-CM

## 2018-05-08 PROCEDURE — 99214 OFFICE O/P EST MOD 30 MIN: CPT | Performed by: NURSE PRACTITIONER

## 2018-05-08 PROCEDURE — 90732 PPSV23 VACC 2 YRS+ SUBQ/IM: CPT | Performed by: NURSE PRACTITIONER

## 2018-05-08 PROCEDURE — G0009 ADMIN PNEUMOCOCCAL VACCINE: HCPCS | Performed by: NURSE PRACTITIONER

## 2018-05-08 RX ORDER — LISINOPRIL 5 MG/1
5 TABLET ORAL DAILY
Qty: 30 TABLET | Refills: 5 | Status: SHIPPED | OUTPATIENT
Start: 2018-05-08 | End: 2018-11-08 | Stop reason: SDUPTHER

## 2018-05-08 RX ORDER — ATENOLOL 25 MG/1
12.5 TABLET ORAL DAILY
Qty: 15 TABLET | Refills: 5 | Status: SHIPPED | OUTPATIENT
Start: 2018-05-08 | End: 2018-11-08 | Stop reason: SDUPTHER

## 2018-05-08 RX ORDER — ERGOCALCIFEROL 1.25 MG/1
50000 CAPSULE ORAL
Qty: 4 CAPSULE | Refills: 5 | Status: SHIPPED | OUTPATIENT
Start: 2018-05-08 | End: 2018-11-08 | Stop reason: SDUPTHER

## 2018-05-08 RX ORDER — SILDENAFIL 50 MG/1
50 TABLET, FILM COATED ORAL DAILY PRN
Qty: 20 TABLET | Refills: 5 | Status: SHIPPED | OUTPATIENT
Start: 2018-05-08 | End: 2018-11-08 | Stop reason: SDUPTHER

## 2018-05-08 RX ORDER — ASPIRIN 325 MG
325 TABLET, DELAYED RELEASE (ENTERIC COATED) ORAL DAILY
Qty: 30 TABLET | Refills: 5 | Status: SHIPPED | OUTPATIENT
Start: 2018-05-08 | End: 2018-11-08 | Stop reason: SDUPTHER

## 2018-05-08 RX ORDER — SIMVASTATIN 40 MG
40 TABLET ORAL NIGHTLY
Qty: 30 TABLET | Refills: 5 | Status: SHIPPED | OUTPATIENT
Start: 2018-05-08 | End: 2018-11-08 | Stop reason: SDUPTHER

## 2018-05-08 NOTE — PROGRESS NOTES
Subjective   Alfonso Barnett is a 47 y.o. male.     Chief Complaint   Patient presents with   • Follow-up     go over labs and refills       History of Present Illness       Obesity - down six pounds since last visit.      Hypertension - stable with current medication. Denies any side effects or symptoms of complications.       Vit d def - normal on recent labs     Diabetes - Pt denies any symptomatic hypo-or hyperglycemia.  Reports compliance with diet and medication regimen.  Currently receives a statin.  Currently receives aspirin.  Currently receives ACE/ARB.  Most recent hemoglobin A1c is available on file and reviewed today.    Hyperlipidemia - pt has high lipid levels. Understands the risks of elevated cholesterol levels. Understands the importance of diet and lifestyle changes .  No side effects of Zocor.     ED- viagra is helping. No side effects, requesting refills.     Anxiety/depression/per schizophrenia-under the care of [facility].  Denies thoughts of hurting self or others.  Denies recent exacerbation.      The following portions of the patient's history were reviewed and updated as appropriate: allergies, current medications, past family history, past medical history, past social history, past surgical history and problem list.    Review of Systems   Constitutional: Negative for appetite change, fatigue and unexpected weight change.   HENT: Negative for congestion, ear pain, nosebleeds, postnasal drip, rhinorrhea, sinus pain, sinus pressure, sore throat, trouble swallowing and voice change.    Eyes: Negative for pain and visual disturbance.   Respiratory: Negative for cough, shortness of breath and wheezing.    Cardiovascular: Negative for chest pain, palpitations and leg swelling.   Gastrointestinal: Negative for abdominal pain, blood in stool, constipation and diarrhea.   Endocrine: Negative for cold intolerance and polydipsia.   Genitourinary: Negative for difficulty urinating,  "dysuria, flank pain, hematuria, penile pain and testicular pain.   Musculoskeletal: Negative for arthralgias, back pain, gait problem, joint swelling, myalgias and neck pain.   Skin: Negative for color change, rash and wound.   Allergic/Immunologic: Negative.  Negative for environmental allergies and food allergies.   Neurological: Negative for syncope, numbness and headaches.   Hematological: Negative.    Psychiatric/Behavioral: Negative for sleep disturbance and suicidal ideas.   All other systems reviewed and are negative.      Objective     /82   Pulse 83   Temp 97.2 °F (36.2 °C) (Tympanic)   Ht 177.8 cm (70\")   Wt 100 kg (221 lb)   SpO2 98%   BMI 31.71 kg/m²   Lab on 05/01/2018   Component Date Value Ref Range Status   • Glucose 05/01/2018 92  70 - 110 mg/dL Final   • BUN 05/01/2018 10  7 - 21 mg/dL Final   • Creatinine 05/01/2018 0.83  0.43 - 1.29 mg/dL Final   • Sodium 05/01/2018 135  135 - 153 mmol/L Final   • Potassium 05/01/2018 4.2  3.5 - 5.3 mmol/L Final   • Chloride 05/01/2018 102  99 - 112 mmol/L Final   • CO2 05/01/2018 26.0  24.3 - 31.9 mmol/L Final   • Calcium 05/01/2018 9.5  7.7 - 10.0 mg/dL Final   • Total Protein 05/01/2018 7.1  6.0 - 8.0 g/dL Final   • Albumin 05/01/2018 4.50  3.50 - 5.00 g/dL Final   • ALT (SGPT) 05/01/2018 31  10 - 44 U/L Final   • AST (SGOT) 05/01/2018 30  10 - 34 U/L Final   • Alkaline Phosphatase 05/01/2018 99  40 - 129 U/L Final   • Total Bilirubin 05/01/2018 0.2  0.2 - 1.8 mg/dL Final   • eGFR Non African Amer 05/01/2018 98  >60 mL/min/1.73 Final   • Globulin 05/01/2018 2.6  gm/dL Final   • A/G Ratio 05/01/2018 1.7  1.5 - 2.5 g/dL Final   • BUN/Creatinine Ratio 05/01/2018 12.0  7.0 - 25.0 Final   • Anion Gap 05/01/2018 7.0  3.6 - 11.2 mmol/L Final   • TSH 05/01/2018 1.290  0.550 - 4.780 mIU/mL Final   • Hemoglobin A1C 05/01/2018 5.60  4.50 - 5.70 % Final   • 25 Hydroxy, Vitamin D 05/01/2018 42.0  ng/ml Final   • Microalbumin, Urine 05/01/2018 6.8  mg/L Final "   • Total Cholesterol 05/01/2018 160  0 - 200 mg/dL Final   • Triglycerides 05/01/2018 132  0 - 150 mg/dL Final   • HDL Cholesterol 05/01/2018 42* 60 - 100 mg/dL Final   • LDL Cholesterol  05/01/2018 92  0 - 100 mg/dL Final   • VLDL Cholesterol 05/01/2018 26.4  mg/dL Final   • LDL/HDL Ratio 05/01/2018 2.18   Final   • Vitamin B-12 05/01/2018 420  211 - 911 pg/mL Final   • WBC 05/01/2018 5.66  4.50 - 12.50 10*3/mm3 Final   • RBC 05/01/2018 5.14  4.70 - 6.10 10*6/mm3 Final   • Hemoglobin 05/01/2018 14.2  14.0 - 18.0 g/dL Final   • Hematocrit 05/01/2018 42.0  42.0 - 52.0 % Final   • MCV 05/01/2018 81.7  80.0 - 94.0 fL Final   • MCH 05/01/2018 27.6  27.0 - 33.0 pg Final   • MCHC 05/01/2018 33.8  33.0 - 37.0 g/dL Final   • RDW 05/01/2018 12.8  11.5 - 14.5 % Final   • RDW-SD 05/01/2018 37.5  37.0 - 54.0 fl Final   • MPV 05/01/2018 9.5  6.0 - 10.0 fL Final   • Platelets 05/01/2018 309  130 - 400 10*3/mm3 Final   • Neutrophil % 05/01/2018 56.4  30.0 - 70.0 % Final   • Lymphocyte % 05/01/2018 30.4  21.0 - 51.0 % Final   • Monocyte % 05/01/2018 8.7  0.0 - 10.0 % Final   • Eosinophil % 05/01/2018 3.4  0.0 - 5.0 % Final   • Basophil % 05/01/2018 1.1  0.0 - 2.0 % Final   • Immature Grans % 05/01/2018 0.0  0.0 - 0.5 % Final   • Neutrophils, Absolute 05/01/2018 3.20  1.40 - 6.50 10*3/mm3 Final   • Lymphocytes, Absolute 05/01/2018 1.72  1.00 - 3.00 10*3/mm3 Final   • Monocytes, Absolute 05/01/2018 0.49  0.10 - 0.90 10*3/mm3 Final   • Eosinophils, Absolute 05/01/2018 0.19  0.00 - 0.70 10*3/mm3 Final   • Basophils, Absolute 05/01/2018 0.06  0.00 - 0.30 10*3/mm3 Final   • Immature Grans, Absolute 05/01/2018 0.00  0.00 - 0.03 10*3/mm3 Final   • Osmolality Calc 05/01/2018 268.8* 273.0 - 305.0 mOsm/kg Final       Physical Exam   Constitutional: He is oriented to person, place, and time. He appears well-developed and well-nourished. No distress.   HENT:   Head: Normocephalic.   Right Ear: External ear normal.   Left Ear: External ear  normal.   Nose: Nose normal.   Mouth/Throat: Oropharynx is clear and moist.   Eyes: Pupils are equal, round, and reactive to light.   Neck: Normal range of motion. Neck supple. No tracheal deviation present. No thyromegaly present.   Cardiovascular: Normal rate, regular rhythm and normal heart sounds.  Exam reveals no gallop and no friction rub.    No murmur heard.  Pulmonary/Chest: Effort normal and breath sounds normal. No respiratory distress. He has no wheezes. He has no rales. He exhibits no tenderness.   Abdominal: Soft. Bowel sounds are normal. He exhibits no distension and no mass. There is no tenderness. There is no rebound and no guarding.   Musculoskeletal: Normal range of motion.   Lymphadenopathy:     He has no cervical adenopathy.   Neurological: He is alert and oriented to person, place, and time. He has normal reflexes.   CN 2-12 grossly intact    Skin: Skin is warm and dry. Capillary refill takes less than 2 seconds. No rash noted. No erythema.   Psychiatric: He has a normal mood and affect. His behavior is normal. Judgment and thought content normal.   Vitals reviewed.    Very talkative and pleasant 47-year-old male.      Assessment/Plan     Problem List Items Addressed This Visit        Cardiovascular and Mediastinum    Hyperlipidemia    Relevant Medications    simvastatin (ZOCOR) 40 MG tablet    Other Relevant Orders    CBC & Differential    Comprehensive Metabolic Panel    TSH    Hemoglobin A1c    Vitamin D 25 Hydroxy    Lipid Panel    Hypertension    Relevant Medications    atenolol (TENORMIN) 25 MG tablet    lisinopril (PRINIVIL,ZESTRIL) 5 MG tablet    Other Relevant Orders    CBC & Differential    Comprehensive Metabolic Panel    TSH    Hemoglobin A1c    Vitamin D 25 Hydroxy    Lipid Panel       Digestive    Vitamin D deficiency disease    Relevant Medications    vitamin D (ERGOCALCIFEROL) 10448 units capsule capsule    Other Relevant Orders    CBC & Differential    Comprehensive Metabolic  Panel    TSH    Hemoglobin A1c    Vitamin D 25 Hydroxy    Lipid Panel       Endocrine    Type 2 diabetes mellitus without complication    Relevant Orders    Hemoglobin A1c    Pneumococcal Polysaccharide Vaccine 23-Valent (PPSV23) Greater Than or Equal To 1yo Subcutaneous / IM       Genitourinary    Prostate disorder       Other    Paranoid schizophrenia - Primary    Relevant Orders    CBC & Differential    Comprehensive Metabolic Panel    TSH    Hemoglobin A1c    Vitamin D 25 Hydroxy    Lipid Panel    Drug-induced erectile dysfunction    Overview     Due to psychotropic medication           Relevant Medications    sildenafil (VIAGRA) 50 MG tablet      Other Visit Diagnoses     Screening for prostate cancer        Preventive measure        Relevant Orders    Pneumococcal Polysaccharide Vaccine 23-Valent (PPSV23) Greater Than or Equal To 1yo Subcutaneous / IM    Encounter for immunization         Relevant Orders    Pneumococcal Polysaccharide Vaccine 23-Valent (PPSV23) Greater Than or Equal To 1yo Subcutaneous / IM          May 2018 labs reviewed and discussed.  I have discussed diagnosis in detail today allowing time for questions and answers. Pt is aware of reasons to seek urgent or emergent medical care as well as reasons to return to the clinic for evaluation. Possible side effects, interactions and progression of symptoms discussed as well. Pt / family states understanding.   Emotional support and active listening provided.   Pt has been instructed today regarding low fat heart smart diet. Weight management and routine exercise has been recommended. Avoid high fat foods, starchy foods and processed foods. Increase lean meats, fresh vegetables and fresh fruits.     Patient's Body mass index is 31.71 kg/m². BMI is above normal parameters. Recommendations include: educational material, exercise counseling and nutrition counseling.  Follow up 3-6 months, sooner if needed.   Routine labs every 3-6 months.           This document has been electronically signed by:  ADOLFO Valero, NP-C

## 2018-08-06 RX ORDER — ALBUTEROL SULFATE 90 UG/1
AEROSOL, METERED RESPIRATORY (INHALATION)
Qty: 18 G | Refills: 0 | Status: SHIPPED | OUTPATIENT
Start: 2018-08-06 | End: 2018-09-06 | Stop reason: SDUPTHER

## 2018-09-06 RX ORDER — ALBUTEROL SULFATE 90 UG/1
AEROSOL, METERED RESPIRATORY (INHALATION)
Qty: 18 G | Refills: 0 | Status: SHIPPED | OUTPATIENT
Start: 2018-09-06 | End: 2018-11-08 | Stop reason: SDUPTHER

## 2018-11-08 ENCOUNTER — OFFICE VISIT (OUTPATIENT)
Dept: FAMILY MEDICINE CLINIC | Facility: CLINIC | Age: 48
End: 2018-11-08

## 2018-11-08 VITALS
DIASTOLIC BLOOD PRESSURE: 84 MMHG | WEIGHT: 223 LBS | OXYGEN SATURATION: 98 % | SYSTOLIC BLOOD PRESSURE: 120 MMHG | HEART RATE: 82 BPM | BODY MASS INDEX: 31.92 KG/M2 | TEMPERATURE: 98 F | HEIGHT: 70 IN

## 2018-11-08 DIAGNOSIS — E55.9 VITAMIN D DEFICIENCY DISEASE: ICD-10-CM

## 2018-11-08 DIAGNOSIS — N52.2 DRUG-INDUCED ERECTILE DYSFUNCTION: ICD-10-CM

## 2018-11-08 DIAGNOSIS — E11.65 TYPE 2 DIABETES MELLITUS WITH HYPERGLYCEMIA, WITHOUT LONG-TERM CURRENT USE OF INSULIN (HCC): ICD-10-CM

## 2018-11-08 DIAGNOSIS — Z12.5 SCREENING FOR PROSTATE CANCER: ICD-10-CM

## 2018-11-08 DIAGNOSIS — N42.9 PROSTATE DISORDER: ICD-10-CM

## 2018-11-08 DIAGNOSIS — H61.23 EXCESSIVE CERUMEN IN BOTH EAR CANALS: Primary | ICD-10-CM

## 2018-11-08 DIAGNOSIS — E78.2 MIXED HYPERLIPIDEMIA: ICD-10-CM

## 2018-11-08 DIAGNOSIS — I10 ESSENTIAL HYPERTENSION: ICD-10-CM

## 2018-11-08 DIAGNOSIS — F20.0 PARANOID SCHIZOPHRENIA (HCC): ICD-10-CM

## 2018-11-08 DIAGNOSIS — Z29.9 PREVENTIVE MEASURE: ICD-10-CM

## 2018-11-08 DIAGNOSIS — E11.9 TYPE 2 DIABETES MELLITUS WITHOUT COMPLICATION, WITHOUT LONG-TERM CURRENT USE OF INSULIN (HCC): ICD-10-CM

## 2018-11-08 LAB
25(OH)D3 SERPL-MCNC: 37 NG/ML
ALBUMIN SERPL-MCNC: 5 G/DL (ref 3.5–5)
ALBUMIN/GLOB SERPL: 2 G/DL (ref 1.5–2.5)
ALP SERPL-CCNC: 88 U/L (ref 40–129)
ALT SERPL W P-5'-P-CCNC: 30 U/L (ref 10–44)
ANION GAP SERPL CALCULATED.3IONS-SCNC: 5 MMOL/L (ref 3.6–11.2)
AST SERPL-CCNC: 24 U/L (ref 10–34)
BASOPHILS # BLD AUTO: 0.04 10*3/MM3 (ref 0–0.3)
BASOPHILS NFR BLD AUTO: 0.6 % (ref 0–2)
BILIRUB SERPL-MCNC: 0.3 MG/DL (ref 0.2–1.8)
BUN BLD-MCNC: 9 MG/DL (ref 7–21)
BUN/CREAT SERPL: 10 (ref 7–25)
CALCIUM SPEC-SCNC: 9.5 MG/DL (ref 7.7–10)
CHLORIDE SERPL-SCNC: 105 MMOL/L (ref 99–112)
CHOLEST SERPL-MCNC: 169 MG/DL (ref 0–200)
CO2 SERPL-SCNC: 28 MMOL/L (ref 24.3–31.9)
CREAT BLD-MCNC: 0.9 MG/DL (ref 0.43–1.29)
DEPRECATED RDW RBC AUTO: 38 FL (ref 37–54)
EOSINOPHIL # BLD AUTO: 0.21 10*3/MM3 (ref 0–0.7)
EOSINOPHIL NFR BLD AUTO: 3.2 % (ref 0–5)
ERYTHROCYTE [DISTWIDTH] IN BLOOD BY AUTOMATED COUNT: 12.7 % (ref 11.5–14.5)
GFR SERPL CREATININE-BSD FRML MDRD: 89 ML/MIN/1.73
GLOBULIN UR ELPH-MCNC: 2.5 GM/DL
GLUCOSE BLD-MCNC: 99 MG/DL (ref 70–110)
HBA1C MFR BLD: 5.4 % (ref 4.5–5.7)
HCT VFR BLD AUTO: 43.5 % (ref 42–52)
HDLC SERPL-MCNC: 52 MG/DL (ref 60–100)
HGB BLD-MCNC: 14.8 G/DL (ref 14–18)
IMM GRANULOCYTES # BLD: 0.01 10*3/MM3 (ref 0–0.03)
IMM GRANULOCYTES NFR BLD: 0.2 % (ref 0–0.5)
LDLC SERPL CALC-MCNC: 101 MG/DL (ref 0–100)
LDLC/HDLC SERPL: 1.93 {RATIO}
LYMPHOCYTES # BLD AUTO: 1.66 10*3/MM3 (ref 1–3)
LYMPHOCYTES NFR BLD AUTO: 25.5 % (ref 21–51)
MCH RBC QN AUTO: 28.1 PG (ref 27–33)
MCHC RBC AUTO-ENTMCNC: 34 G/DL (ref 33–37)
MCV RBC AUTO: 82.7 FL (ref 80–94)
MONOCYTES # BLD AUTO: 0.46 10*3/MM3 (ref 0.1–0.9)
MONOCYTES NFR BLD AUTO: 7.1 % (ref 0–10)
NEUTROPHILS # BLD AUTO: 4.12 10*3/MM3 (ref 1.4–6.5)
NEUTROPHILS NFR BLD AUTO: 63.4 % (ref 30–70)
OSMOLALITY SERPL CALC.SUM OF ELEC: 274.4 MOSM/KG (ref 273–305)
PLATELET # BLD AUTO: 311 10*3/MM3 (ref 130–400)
PMV BLD AUTO: 9.6 FL (ref 6–10)
POTASSIUM BLD-SCNC: 4.1 MMOL/L (ref 3.5–5.3)
PROT SERPL-MCNC: 7.5 G/DL (ref 6–8)
RBC # BLD AUTO: 5.26 10*6/MM3 (ref 4.7–6.1)
SODIUM BLD-SCNC: 138 MMOL/L (ref 135–153)
TRIGL SERPL-MCNC: 82 MG/DL (ref 0–150)
TSH SERPL DL<=0.05 MIU/L-ACNC: 1.16 MIU/ML (ref 0.55–4.78)
VLDLC SERPL-MCNC: 16.4 MG/DL
WBC NRBC COR # BLD: 6.5 10*3/MM3 (ref 4.5–12.5)

## 2018-11-08 PROCEDURE — 99396 PREV VISIT EST AGE 40-64: CPT | Performed by: NURSE PRACTITIONER

## 2018-11-08 PROCEDURE — 80061 LIPID PANEL: CPT | Performed by: NURSE PRACTITIONER

## 2018-11-08 PROCEDURE — 84153 ASSAY OF PSA TOTAL: CPT | Performed by: NURSE PRACTITIONER

## 2018-11-08 PROCEDURE — 80053 COMPREHEN METABOLIC PANEL: CPT | Performed by: NURSE PRACTITIONER

## 2018-11-08 PROCEDURE — 84443 ASSAY THYROID STIM HORMONE: CPT | Performed by: NURSE PRACTITIONER

## 2018-11-08 PROCEDURE — 84402 ASSAY OF FREE TESTOSTERONE: CPT | Performed by: NURSE PRACTITIONER

## 2018-11-08 PROCEDURE — 82306 VITAMIN D 25 HYDROXY: CPT | Performed by: NURSE PRACTITIONER

## 2018-11-08 PROCEDURE — 69210 REMOVE IMPACTED EAR WAX UNI: CPT | Performed by: NURSE PRACTITIONER

## 2018-11-08 PROCEDURE — 83036 HEMOGLOBIN GLYCOSYLATED A1C: CPT | Performed by: NURSE PRACTITIONER

## 2018-11-08 PROCEDURE — 84154 ASSAY OF PSA FREE: CPT | Performed by: NURSE PRACTITIONER

## 2018-11-08 PROCEDURE — 85025 COMPLETE CBC W/AUTO DIFF WBC: CPT | Performed by: NURSE PRACTITIONER

## 2018-11-08 PROCEDURE — 84403 ASSAY OF TOTAL TESTOSTERONE: CPT | Performed by: NURSE PRACTITIONER

## 2018-11-08 RX ORDER — AMITRIPTYLINE HYDROCHLORIDE 100 MG/1
100 TABLET, FILM COATED ORAL NIGHTLY
Qty: 30 TABLET | Refills: 0 | Status: SHIPPED | OUTPATIENT
Start: 2018-11-08 | End: 2019-05-08

## 2018-11-08 RX ORDER — ERGOCALCIFEROL 1.25 MG/1
50000 CAPSULE ORAL
Qty: 4 CAPSULE | Refills: 5 | Status: SHIPPED | OUTPATIENT
Start: 2018-11-08 | End: 2019-05-08 | Stop reason: SDUPTHER

## 2018-11-08 RX ORDER — ASPIRIN 325 MG
325 TABLET, DELAYED RELEASE (ENTERIC COATED) ORAL DAILY
Qty: 30 TABLET | Refills: 5 | Status: SHIPPED | OUTPATIENT
Start: 2018-11-08 | End: 2019-05-08 | Stop reason: SDUPTHER

## 2018-11-08 RX ORDER — LISINOPRIL 5 MG/1
5 TABLET ORAL DAILY
Qty: 30 TABLET | Refills: 5 | Status: SHIPPED | OUTPATIENT
Start: 2018-11-08 | End: 2019-05-08 | Stop reason: SDUPTHER

## 2018-11-08 RX ORDER — SIMVASTATIN 40 MG
40 TABLET ORAL NIGHTLY
Qty: 30 TABLET | Refills: 5 | Status: SHIPPED | OUTPATIENT
Start: 2018-11-08 | End: 2019-05-08 | Stop reason: SDUPTHER

## 2018-11-08 RX ORDER — SILDENAFIL 50 MG/1
50 TABLET, FILM COATED ORAL DAILY PRN
Qty: 20 TABLET | Refills: 5 | Status: SHIPPED | OUTPATIENT
Start: 2018-11-08 | End: 2019-08-22 | Stop reason: SDUPTHER

## 2018-11-08 RX ORDER — ALBUTEROL SULFATE 90 UG/1
2 AEROSOL, METERED RESPIRATORY (INHALATION) EVERY 4 HOURS PRN
Qty: 18 G | Refills: 0 | Status: SHIPPED | OUTPATIENT
Start: 2018-11-08 | End: 2019-05-08

## 2018-11-08 RX ORDER — ATENOLOL 25 MG/1
12.5 TABLET ORAL DAILY
Qty: 15 TABLET | Refills: 5 | Status: SHIPPED | OUTPATIENT
Start: 2018-11-08 | End: 2019-05-08 | Stop reason: SDUPTHER

## 2018-11-08 NOTE — PROGRESS NOTES
Subjective   Alfonso Barnett is a 48 y.o. male.     Chief Complaint   Patient presents with   • physical       History of Present Illness     Anxiety/depression disorder-patient is under the care of [facility].  He has requested a refill on Elavil today just long enough to get him to his next comp of care appointment.  Patient currently receives amitriptyline, Zyprexa.    Drug-induced erectile dysfunction-patient feels that his amitriptyline and blood pressure medications decreased his ability to maintain an erection.  He reports that when necessary use of Viagra is helpful for this.    Hyperlipidemia-patient is currently receiving Zocor 40.  We will obtain fasting labs today.    Hypertension-stable with atenolol and lisinopril.  Patient is currently on a low dose aspirin 325 mg daily.    Paranoid schizophrenia-patient is under the care of behavioral health.    Type 2 diabetes - hyperglycemia only when he is noncompliant with diet.  Patient does take fairly good job of monitoring and controlling his glucose with diet.    Vitamin D deficiency-stable with vitamin D supplementation    Preventative health-patient declines influenza vaccine.  Patient declines pneumococcal vaccine.  Patient declines a DEXA scan at this time.  He isn't scheduled for a PSA and hemoglobin A1c today as well as other screening labs.    The following portions of the patient's history were reviewed and updated as appropriate: allergies, current medications, past family history, past medical history, past social history, past surgical history and problem list.    Review of Systems   Constitutional: Negative for appetite change, fatigue and unexpected weight change.   HENT: Negative for congestion, ear pain, nosebleeds, postnasal drip, rhinorrhea, sinus pain, sinus pressure, sore throat, trouble swallowing and voice change.    Eyes: Negative for pain and visual disturbance.   Respiratory: Negative for cough, shortness of breath and wheezing.   "  Cardiovascular: Negative for chest pain, palpitations and leg swelling.   Gastrointestinal: Negative for abdominal pain, blood in stool, constipation and diarrhea.   Endocrine: Negative for cold intolerance and polydipsia.   Genitourinary: Negative for difficulty urinating, dysuria, flank pain and hematuria.   Musculoskeletal: Negative for arthralgias, back pain, gait problem, joint swelling and myalgias.   Skin: Negative for color change and rash.   Allergic/Immunologic: Negative for environmental allergies.   Neurological: Negative for syncope, numbness and headaches.   Hematological: Negative.    Psychiatric/Behavioral: Negative for sleep disturbance and suicidal ideas.   All other systems reviewed and are negative.      Objective     /84   Pulse 82   Temp 98 °F (36.7 °C) (Tympanic)   Ht 177.8 cm (70\")   Wt 101 kg (223 lb)   SpO2 98%   BMI 32.00 kg/m²   Lab on 05/01/2018   Component Date Value Ref Range Status   • Glucose 05/01/2018 92  70 - 110 mg/dL Final   • BUN 05/01/2018 10  7 - 21 mg/dL Final   • Creatinine 05/01/2018 0.83  0.43 - 1.29 mg/dL Final   • Sodium 05/01/2018 135  135 - 153 mmol/L Final   • Potassium 05/01/2018 4.2  3.5 - 5.3 mmol/L Final   • Chloride 05/01/2018 102  99 - 112 mmol/L Final   • CO2 05/01/2018 26.0  24.3 - 31.9 mmol/L Final   • Calcium 05/01/2018 9.5  7.7 - 10.0 mg/dL Final   • Total Protein 05/01/2018 7.1  6.0 - 8.0 g/dL Final   • Albumin 05/01/2018 4.50  3.50 - 5.00 g/dL Final   • ALT (SGPT) 05/01/2018 31  10 - 44 U/L Final   • AST (SGOT) 05/01/2018 30  10 - 34 U/L Final   • Alkaline Phosphatase 05/01/2018 99  40 - 129 U/L Final   • Total Bilirubin 05/01/2018 0.2  0.2 - 1.8 mg/dL Final   • eGFR Non African Amer 05/01/2018 98  >60 mL/min/1.73 Final   • Globulin 05/01/2018 2.6  gm/dL Final   • A/G Ratio 05/01/2018 1.7  1.5 - 2.5 g/dL Final   • BUN/Creatinine Ratio 05/01/2018 12.0  7.0 - 25.0 Final   • Anion Gap 05/01/2018 7.0  3.6 - 11.2 mmol/L Final   • TSH 05/01/2018 " 1.290  0.550 - 4.780 mIU/mL Final   • Hemoglobin A1C 05/01/2018 5.60  4.50 - 5.70 % Final   • 25 Hydroxy, Vitamin D 05/01/2018 42.0  ng/ml Final   • Microalbumin, Urine 05/01/2018 6.8  mg/L Final   • Total Cholesterol 05/01/2018 160  0 - 200 mg/dL Final   • Triglycerides 05/01/2018 132  0 - 150 mg/dL Final   • HDL Cholesterol 05/01/2018 42* 60 - 100 mg/dL Final   • LDL Cholesterol  05/01/2018 92  0 - 100 mg/dL Final   • VLDL Cholesterol 05/01/2018 26.4  mg/dL Final   • LDL/HDL Ratio 05/01/2018 2.18   Final   • Vitamin B-12 05/01/2018 420  211 - 911 pg/mL Final   • WBC 05/01/2018 5.66  4.50 - 12.50 10*3/mm3 Final   • RBC 05/01/2018 5.14  4.70 - 6.10 10*6/mm3 Final   • Hemoglobin 05/01/2018 14.2  14.0 - 18.0 g/dL Final   • Hematocrit 05/01/2018 42.0  42.0 - 52.0 % Final   • MCV 05/01/2018 81.7  80.0 - 94.0 fL Final   • MCH 05/01/2018 27.6  27.0 - 33.0 pg Final   • MCHC 05/01/2018 33.8  33.0 - 37.0 g/dL Final   • RDW 05/01/2018 12.8  11.5 - 14.5 % Final   • RDW-SD 05/01/2018 37.5  37.0 - 54.0 fl Final   • MPV 05/01/2018 9.5  6.0 - 10.0 fL Final   • Platelets 05/01/2018 309  130 - 400 10*3/mm3 Final   • Neutrophil % 05/01/2018 56.4  30.0 - 70.0 % Final   • Lymphocyte % 05/01/2018 30.4  21.0 - 51.0 % Final   • Monocyte % 05/01/2018 8.7  0.0 - 10.0 % Final   • Eosinophil % 05/01/2018 3.4  0.0 - 5.0 % Final   • Basophil % 05/01/2018 1.1  0.0 - 2.0 % Final   • Immature Grans % 05/01/2018 0.0  0.0 - 0.5 % Final   • Neutrophils, Absolute 05/01/2018 3.20  1.40 - 6.50 10*3/mm3 Final   • Lymphocytes, Absolute 05/01/2018 1.72  1.00 - 3.00 10*3/mm3 Final   • Monocytes, Absolute 05/01/2018 0.49  0.10 - 0.90 10*3/mm3 Final   • Eosinophils, Absolute 05/01/2018 0.19  0.00 - 0.70 10*3/mm3 Final   • Basophils, Absolute 05/01/2018 0.06  0.00 - 0.30 10*3/mm3 Final   • Immature Grans, Absolute 05/01/2018 0.00  0.00 - 0.03 10*3/mm3 Final   • Osmolality Calc 05/01/2018 268.8* 273.0 - 305.0 mOsm/kg Final     PHQ-9 Depression Screening  Little  interest or pleasure in doing things? 1   Feeling down, depressed, or hopeless? 1   Trouble falling or staying asleep, or sleeping too much? 3   Feeling tired or having little energy? 3   Poor appetite or overeating? 0   Feeling bad about yourself - or that you are a failure or have let yourself or your family down? 0   Trouble concentrating on things, such as reading the newspaper or watching television? 0   Moving or speaking so slowly that other people could have noticed? Or the opposite - being so fidgety or restless that you have been moving around a lot more than usual? 0   Thoughts that you would be better off dead, or of hurting yourself in some way? 0   PHQ-9 Total Score 8   If you checked off any problems, how difficult have these problems made it for you to do your work, take care of things at home, or get along with other people? Somewhat difficult       Fall Risk Assessment  Fallen in past 6 months: 0--> No  Mental Status: 0--> no mental status change  Mobility: 2--> New mobility issue  Medications: 0--> No meds  Total Fall Risk Score: 2     Visual Acuity Screening    Right eye Left eye Both eyes   Without correction: 20/25 20/25 20/20   With correction:      Hearing is adequate her whisper test.      Physical Exam   Constitutional: He is oriented to person, place, and time. He appears well-developed and well-nourished.   HENT:   Head: Normocephalic.   Right Ear: External ear normal. There is drainage.   Left Ear: External ear normal.   Nose: Nose normal.   Mouth/Throat: Oropharynx is clear and moist.   Bilateral ear canals impacted with cerumen, removed via provider with flexi-loop. Honey colored cerumen removed, tolerated well.      Eyes: Pupils are equal, round, and reactive to light. Conjunctivae are normal. Right eye exhibits no discharge. Left eye exhibits no discharge.   Neck: Normal range of motion. Neck supple. No tracheal deviation present. No thyromegaly present.   Cardiovascular: Normal  rate, regular rhythm and normal heart sounds.  Exam reveals no gallop and no friction rub.    No murmur heard.  Pulmonary/Chest: Effort normal and breath sounds normal. No respiratory distress. He has no wheezes. He has no rales. He exhibits no tenderness.   Abdominal: Soft. Bowel sounds are normal. He exhibits no distension and no mass. There is no tenderness. There is no rebound and no guarding.   Musculoskeletal: Normal range of motion.   Neurological: He is alert and oriented to person, place, and time. He has normal reflexes.   CN 2-12 grossly intact    Skin: Skin is warm and dry. Capillary refill takes less than 2 seconds. No rash noted. No erythema.   Psychiatric: He has a normal mood and affect. His behavior is normal. Judgment and thought content normal.       Assessment/Plan     Problem List Items Addressed This Visit        Cardiovascular and Mediastinum    Hyperlipidemia    Relevant Medications    simvastatin (ZOCOR) 40 MG tablet    Other Relevant Orders    CBC Auto Differential    Hypertension    Relevant Medications    atenolol (TENORMIN) 25 MG tablet    lisinopril (PRINIVIL,ZESTRIL) 5 MG tablet    Other Relevant Orders    CBC Auto Differential       Digestive    Vitamin D deficiency disease    Relevant Medications    vitamin D (ERGOCALCIFEROL) 22256 units capsule capsule    Other Relevant Orders    CBC Auto Differential       Endocrine    Type 2 diabetes mellitus with hyperglycemia, without long-term current use of insulin (CMS/HCC)       Nervous and Auditory    Excessive cerumen in both ear canals - Primary       Genitourinary    Prostate disorder       Other    Paranoid schizophrenia (CMS/HCC)    Relevant Medications    amitriptyline (ELAVIL) 100 MG tablet    Other Relevant Orders    CBC Auto Differential    Drug-induced erectile dysfunction    Overview     Due to psychotropic medication           Relevant Medications    sildenafil (VIAGRA) 50 MG tablet    Other Relevant Orders    Testosterone,  Free, Total      Other Visit Diagnoses     Screening for prostate cancer        Preventive measure              Current Outpatient Prescriptions:   •  albuterol (VENTOLIN HFA) 108 (90 Base) MCG/ACT inhaler, Inhale 2 puffs Every 4 (Four) Hours As Needed for Wheezing., Disp: 18 g, Rfl: 0  •  amitriptyline (ELAVIL) 100 MG tablet, Take 1 tablet by mouth Every Night., Disp: 30 tablet, Rfl: 0  •  aspirin  MG tablet, Take 1 tablet by mouth Daily., Disp: 30 tablet, Rfl: 5  •  atenolol (TENORMIN) 25 MG tablet, Take 0.5 tablets by mouth Daily., Disp: 15 tablet, Rfl: 5  •  lisinopril (PRINIVIL,ZESTRIL) 5 MG tablet, Take 1 tablet by mouth Daily., Disp: 30 tablet, Rfl: 5  •  OLANZapine (zyPREXA) 20 MG tablet, , Disp: , Rfl:   •  sildenafil (VIAGRA) 50 MG tablet, Take 1 tablet by mouth Daily As Needed for erectile dysfunction., Disp: 20 tablet, Rfl: 5  •  simvastatin (ZOCOR) 40 MG tablet, Take 1 tablet by mouth Every Night., Disp: 30 tablet, Rfl: 5  •  vitamin D (ERGOCALCIFEROL) 62988 units capsule capsule, Take 1 capsule by mouth Every 7 (Seven) Days., Disp: 4 capsule, Rfl: 5    Age appropriate education and safety instruction has been provided. Preventive education/recommendation > 15 minutes. Safety, accident prevention, vaccines, health maintenance concerns, nutrition, diet, exercise and social activity.     Labs today including testosterone level.       Patient's Body mass index is 32 kg/m². BMI is above normal parameters. Recommendations include: exercise counseling and nutrition counseling.      I have discussed diagnosis in detail today allowing time for questions and answers. Pt is aware of reasons to seek urgent or emergent medical care as well as reasons to return to the clinic for evaluation. Possible side effects, interactions and progression of symptoms discussed as well. Pt / family states understanding.   Emotional support and active listening provided.       Follow up in 3 - 6 months. Routine labs fasting  one week prior to next office visit. Return sooner if needed.       Errors in dictation may reflect use of voice recognition software and not all errors in transcription may have been detected prior to signing.           This document has been electronically signed by:  ADOLFO Valero, NP-C

## 2018-11-09 LAB
PSA FREE MFR SERPL: 15.5 %
PSA FREE SERPL-MCNC: 0.17 NG/ML
PSA SERPL-MCNC: 1.1 NG/ML (ref 0–4)

## 2018-11-13 LAB
TESTOST FREE SERPL-MCNC: NORMAL PG/ML
TESTOST SERPL-MCNC: 383 NG/DL (ref 264–916)

## 2019-02-28 ENCOUNTER — TELEPHONE (OUTPATIENT)
Dept: FAMILY MEDICINE CLINIC | Facility: CLINIC | Age: 49
End: 2019-02-28

## 2019-02-28 ENCOUNTER — OFFICE VISIT (OUTPATIENT)
Dept: FAMILY MEDICINE CLINIC | Facility: CLINIC | Age: 49
End: 2019-02-28

## 2019-02-28 VITALS
BODY MASS INDEX: 33.07 KG/M2 | OXYGEN SATURATION: 97 % | SYSTOLIC BLOOD PRESSURE: 130 MMHG | HEIGHT: 70 IN | WEIGHT: 231 LBS | TEMPERATURE: 97.9 F | DIASTOLIC BLOOD PRESSURE: 70 MMHG | HEART RATE: 98 BPM

## 2019-02-28 DIAGNOSIS — E11.65 TYPE 2 DIABETES MELLITUS WITH HYPERGLYCEMIA, WITHOUT LONG-TERM CURRENT USE OF INSULIN (HCC): ICD-10-CM

## 2019-02-28 DIAGNOSIS — R53.82 CHRONIC FATIGUE: ICD-10-CM

## 2019-02-28 DIAGNOSIS — E78.2 MIXED HYPERLIPIDEMIA: ICD-10-CM

## 2019-02-28 DIAGNOSIS — Z72.51 HIGH RISK HETEROSEXUAL BEHAVIOR: ICD-10-CM

## 2019-02-28 DIAGNOSIS — Z20.5 EXPOSURE TO HEPATITIS B: Primary | ICD-10-CM

## 2019-02-28 DIAGNOSIS — E55.9 VITAMIN D DEFICIENCY DISEASE: ICD-10-CM

## 2019-02-28 LAB
25(OH)D3 SERPL-MCNC: 36 NG/ML
ALBUMIN SERPL-MCNC: 4.9 G/DL (ref 3.5–5)
ALBUMIN/GLOB SERPL: 2 G/DL (ref 1.5–2.5)
ALP SERPL-CCNC: 95 U/L (ref 40–129)
ALT SERPL W P-5'-P-CCNC: 45 U/L (ref 10–44)
ANION GAP SERPL CALCULATED.3IONS-SCNC: 5.3 MMOL/L (ref 3.6–11.2)
AST SERPL-CCNC: 31 U/L (ref 10–34)
BASOPHILS # BLD AUTO: 0.02 10*3/MM3 (ref 0–0.3)
BASOPHILS NFR BLD AUTO: 0.3 % (ref 0–2)
BILIRUB SERPL-MCNC: 0.3 MG/DL (ref 0.2–1.8)
BUN BLD-MCNC: 8 MG/DL (ref 7–21)
BUN/CREAT SERPL: 9.2 (ref 7–25)
CALCIUM SPEC-SCNC: 9.6 MG/DL (ref 7.7–10)
CHLORIDE SERPL-SCNC: 100 MMOL/L (ref 99–112)
CHOLEST SERPL-MCNC: 147 MG/DL (ref 0–200)
CO2 SERPL-SCNC: 29.7 MMOL/L (ref 24.3–31.9)
CREAT BLD-MCNC: 0.87 MG/DL (ref 0.43–1.29)
DEPRECATED RDW RBC AUTO: 36.9 FL (ref 37–54)
EOSINOPHIL # BLD AUTO: 0.21 10*3/MM3 (ref 0–0.7)
EOSINOPHIL NFR BLD AUTO: 3.6 % (ref 0–5)
ERYTHROCYTE [DISTWIDTH] IN BLOOD BY AUTOMATED COUNT: 12.9 % (ref 11.5–14.5)
GFR SERPL CREATININE-BSD FRML MDRD: 93 ML/MIN/1.73
GLOBULIN UR ELPH-MCNC: 2.5 GM/DL
GLUCOSE BLD-MCNC: 95 MG/DL (ref 70–110)
HAV IGM SERPL QL IA: NORMAL
HBA1C MFR BLD: 5.5 % (ref 4.5–5.7)
HBV CORE IGM SERPL QL IA: NORMAL
HBV SURFACE AG SERPL QL IA: NORMAL
HCT VFR BLD AUTO: 40 % (ref 42–52)
HCV AB SER DONR QL: NORMAL
HDLC SERPL-MCNC: 45 MG/DL (ref 60–100)
HGB BLD-MCNC: 14 G/DL (ref 14–18)
IMM GRANULOCYTES # BLD AUTO: 0.01 10*3/MM3 (ref 0–0.03)
IMM GRANULOCYTES NFR BLD AUTO: 0.2 % (ref 0–0.5)
LDLC SERPL CALC-MCNC: 89 MG/DL (ref 0–100)
LDLC/HDLC SERPL: 1.98 {RATIO}
LYMPHOCYTES # BLD AUTO: 1.96 10*3/MM3 (ref 1–3)
LYMPHOCYTES NFR BLD AUTO: 33.4 % (ref 21–51)
MCH RBC QN AUTO: 28.4 PG (ref 27–33)
MCHC RBC AUTO-ENTMCNC: 35 G/DL (ref 33–37)
MCV RBC AUTO: 81.1 FL (ref 80–94)
MONOCYTES # BLD AUTO: 0.61 10*3/MM3 (ref 0.1–0.9)
MONOCYTES NFR BLD AUTO: 10.4 % (ref 0–10)
NEUTROPHILS # BLD AUTO: 3.05 10*3/MM3 (ref 1.4–6.5)
NEUTROPHILS NFR BLD AUTO: 52.1 % (ref 30–70)
OSMOLALITY SERPL CALC.SUM OF ELEC: 268.2 MOSM/KG (ref 273–305)
PLATELET # BLD AUTO: 345 10*3/MM3 (ref 130–400)
PMV BLD AUTO: 9 FL (ref 6–10)
POTASSIUM BLD-SCNC: 3.9 MMOL/L (ref 3.5–5.3)
PROT SERPL-MCNC: 7.4 G/DL (ref 6–8)
RBC # BLD AUTO: 4.93 10*6/MM3 (ref 4.7–6.1)
SODIUM BLD-SCNC: 135 MMOL/L (ref 135–153)
TRIGL SERPL-MCNC: 64 MG/DL (ref 0–150)
TSH SERPL DL<=0.05 MIU/L-ACNC: 1.65 MIU/ML (ref 0.55–4.78)
VLDLC SERPL-MCNC: 12.8 MG/DL
WBC NRBC COR # BLD: 5.86 10*3/MM3 (ref 4.5–12.5)

## 2019-02-28 PROCEDURE — 99214 OFFICE O/P EST MOD 30 MIN: CPT | Performed by: NURSE PRACTITIONER

## 2019-02-28 PROCEDURE — 83036 HEMOGLOBIN GLYCOSYLATED A1C: CPT | Performed by: NURSE PRACTITIONER

## 2019-02-28 PROCEDURE — 80053 COMPREHEN METABOLIC PANEL: CPT | Performed by: NURSE PRACTITIONER

## 2019-02-28 PROCEDURE — 82306 VITAMIN D 25 HYDROXY: CPT | Performed by: NURSE PRACTITIONER

## 2019-02-28 PROCEDURE — 80061 LIPID PANEL: CPT | Performed by: NURSE PRACTITIONER

## 2019-02-28 PROCEDURE — 84443 ASSAY THYROID STIM HORMONE: CPT | Performed by: NURSE PRACTITIONER

## 2019-02-28 PROCEDURE — 85025 COMPLETE CBC W/AUTO DIFF WBC: CPT | Performed by: NURSE PRACTITIONER

## 2019-02-28 PROCEDURE — 80074 ACUTE HEPATITIS PANEL: CPT | Performed by: NURSE PRACTITIONER

## 2019-02-28 NOTE — TELEPHONE ENCOUNTER
----- Message from ADOLFO Clifton sent at 2/28/2019  3:15 PM EST -----  Hepatitis Labs are reviewed as normal. Please notify patient.        Spoke with darien and let him know results

## 2019-05-08 ENCOUNTER — OFFICE VISIT (OUTPATIENT)
Dept: FAMILY MEDICINE CLINIC | Facility: CLINIC | Age: 49
End: 2019-05-08

## 2019-05-08 VITALS
HEART RATE: 100 BPM | DIASTOLIC BLOOD PRESSURE: 80 MMHG | OXYGEN SATURATION: 98 % | SYSTOLIC BLOOD PRESSURE: 140 MMHG | TEMPERATURE: 98.3 F | WEIGHT: 230 LBS | BODY MASS INDEX: 32.93 KG/M2 | HEIGHT: 70 IN

## 2019-05-08 DIAGNOSIS — E55.9 VITAMIN D DEFICIENCY DISEASE: ICD-10-CM

## 2019-05-08 DIAGNOSIS — Z12.11 SCREENING FOR COLON CANCER: Primary | ICD-10-CM

## 2019-05-08 DIAGNOSIS — E11.65 TYPE 2 DIABETES MELLITUS WITH HYPERGLYCEMIA, WITHOUT LONG-TERM CURRENT USE OF INSULIN (HCC): ICD-10-CM

## 2019-05-08 DIAGNOSIS — I10 ESSENTIAL HYPERTENSION: ICD-10-CM

## 2019-05-08 DIAGNOSIS — F20.0 PARANOID SCHIZOPHRENIA (HCC): ICD-10-CM

## 2019-05-08 DIAGNOSIS — F06.4 ANXIETY DISORDER DUE TO KNOWN PHYSIOLOGICAL CONDITION: ICD-10-CM

## 2019-05-08 DIAGNOSIS — E78.2 MIXED HYPERLIPIDEMIA: ICD-10-CM

## 2019-05-08 PROCEDURE — G0439 PPPS, SUBSEQ VISIT: HCPCS | Performed by: NURSE PRACTITIONER

## 2019-05-08 RX ORDER — LISINOPRIL 5 MG/1
5 TABLET ORAL DAILY
Qty: 30 TABLET | Refills: 5 | Status: SHIPPED | OUTPATIENT
Start: 2019-05-08 | End: 2020-02-18

## 2019-05-08 RX ORDER — ERGOCALCIFEROL 1.25 MG/1
50000 CAPSULE ORAL
Qty: 4 CAPSULE | Refills: 5 | Status: SHIPPED | OUTPATIENT
Start: 2019-05-08 | End: 2020-02-18

## 2019-05-08 RX ORDER — ATENOLOL 25 MG/1
12.5 TABLET ORAL DAILY
Qty: 15 TABLET | Refills: 5 | Status: SHIPPED | OUTPATIENT
Start: 2019-05-08 | End: 2020-02-27 | Stop reason: SDUPTHER

## 2019-05-08 RX ORDER — AMITRIPTYLINE HYDROCHLORIDE 100 MG/1
100 TABLET, FILM COATED ORAL NIGHTLY
Qty: 30 TABLET | Refills: 0
Start: 2019-05-08 | End: 2020-03-17 | Stop reason: SDUPTHER

## 2019-05-08 RX ORDER — ASPIRIN 325 MG
325 TABLET, DELAYED RELEASE (ENTERIC COATED) ORAL DAILY
Qty: 30 TABLET | Refills: 5 | Status: SHIPPED | OUTPATIENT
Start: 2019-05-08 | End: 2020-02-27 | Stop reason: SDUPTHER

## 2019-05-08 RX ORDER — SIMVASTATIN 40 MG
40 TABLET ORAL NIGHTLY
Qty: 30 TABLET | Refills: 5 | Status: SHIPPED | OUTPATIENT
Start: 2019-05-08 | End: 2019-05-08

## 2019-05-08 RX ORDER — SIMVASTATIN 20 MG
20 TABLET ORAL NIGHTLY
Qty: 30 TABLET | Refills: 5 | Status: SHIPPED | OUTPATIENT
Start: 2019-05-08 | End: 2019-11-05

## 2019-05-08 NOTE — ASSESSMENT & PLAN NOTE
Decrease Zocor from 40 mg down to 20 mg nightly.  Work on weight loss and continue a heart healthy diet.  We will repeat lipid panel in 6 months.

## 2019-05-08 NOTE — ASSESSMENT & PLAN NOTE
Diabetes is improving with lifestyle modifications.   Continue current treatment regimen.  Diabetes will be reassessed in 6 months.

## 2019-05-08 NOTE — ASSESSMENT & PLAN NOTE
Recommend a low sodium heart smart diet.  Weight loss is recommended.  Continue lisinopril 5 mg daily and atenolol 12.5 mg daily.  Continue checking blood pressure randomly and report any readings greater than 150/90 or less than 100/60.

## 2019-05-08 NOTE — PROGRESS NOTES
QUICK REFERENCE INFORMATION:  The ABCs of the Annual Wellness Visit    Subsequent Medicare Wellness Visit     HEALTH RISK ASSESSMENT    : 1970    Recent Hospitalizations:  No hospitalization(s) within the last year..  ccc      Current Medical Providers:  Patient Care Team:  Carolina Lua APRN as PCP - General (Family Medicine)        Smoking Status:  Social History     Tobacco Use   Smoking Status Never Smoker   Smokeless Tobacco Never Used       Alcohol Consumption:  Social History     Substance and Sexual Activity   Alcohol Use No       Depression Screen:   PHQ-2/PHQ-9 Depression Screening 2019   Little interest or pleasure in doing things 0   Feeling down, depressed, or hopeless 0   Trouble falling or staying asleep, or sleeping too much -   Feeling tired or having little energy -   Poor appetite or overeating -   Feeling bad about yourself - or that you are a failure or have let yourself or your family down -   Trouble concentrating on things, such as reading the newspaper or watching television -   Moving or speaking so slowly that other people could have noticed. Or the opposite - being so fidgety or restless that you have been moving around a lot more than usual -   Thoughts that you would be better off dead, or of hurting yourself in some way -   Total Score 0   If you checked off any problems, how difficult have these problems made it for you to do your work, take care of things at home, or get along with other people? -       Health Habits and Functional and Cognitive Screening:  Functional & Cognitive Status 2019   Do you have difficulty preparing food and eating? No   Do you have difficulty bathing yourself, getting dressed or grooming yourself? No   Do you have difficulty using the toilet? No   Do you have difficulty moving around from place to place? No   Do you have trouble with steps or getting out of a bed or a chair? No   In the past year have you fallen or experienced  a near fall? No   Current Diet Well Balanced Diet   Dental Exam Not up to date   Eye Exam Up to date   Exercise (times per week) 7 times per week   Current Exercise Activities Include Yard Work   Do you need help using the phone?  No   Are you deaf or do you have serious difficulty hearing?  No   Do you need help with transportation? No   Do you need help shopping? No   Do you need help preparing meals?  No   Do you need help with housework?  No   Do you need help with laundry? No   Do you need help taking your medications? No   Do you need help managing money? No   Do you ever drive or ride in a car without wearing a seat belt? Yes   Have you felt unusual stress, anger or loneliness in the last month? Yes   Who do you live with? Spouse   If you need help, do you have trouble finding someone available to you? No   Have you been bothered in the last four weeks by sexual problems? No   Do you have difficulty concentrating, remembering or making decisions? -           Does the patient have evidence of cognitive impairment? No    Asiprin use counseling: Taking ASA appropriately as indicated      Recent Lab Results:       Lab Results   Component Value Date    HGBA1C 5.50 02/28/2019     Lab Results   Component Value Date    CHOL 147 02/28/2019    TRIG 64 02/28/2019    HDL 45 (L) 02/28/2019    VLDL 12.8 02/28/2019    LDLHDL 1.98 02/28/2019           Age-appropriate Screening Schedule:  Refer to the list below for future screening recommendations based on patient's age, sex and/or medical conditions. Orders for these recommended tests are listed in the plan section. The patient has been provided with a written plan.    Health Maintenance   Topic Date Due   • COLONOSCOPY  09/12/2016   • DIABETIC EYE EXAM  05/08/2019   • ZOSTER VACCINE (1 of 2) 11/14/2019 (Originally 5/18/2018)   • DXA SCAN  05/08/2020 (Originally 9/12/2016)   • INFLUENZA VACCINE  08/01/2019   • HEMOGLOBIN A1C  08/28/2019   • PROSTATE CANCER SCREENING   11/08/2019   • DIABETIC FOOT EXAM  11/08/2019   • LIPID PANEL  02/28/2020   • URINE MICROALBUMIN  02/28/2020   • TDAP/TD VACCINES (2 - Td) 05/01/2027   • PNEUMOCOCCAL VACCINE (19-64 MEDIUM RISK)  Completed        Subjective   History of Present Illness    Alfonso Barnett is a 48 y.o. male who presents for an Annual Wellness Visit.    Vitamin D deficiency-patient has not been taking his vitamin D supplement.  He states he will pick this up at his pharmacy today.    Type 2 diabetes-stable with lifestyle and diet changes.  A1c is on file and normal.    Hyperlipidemia- most recent lipid panel shows great improvement.  Patient receives Zocor 40 mg daily.    Hypertension- patient receives 12.5 mg of atenolol and lisinopril 5 mg daily.  He does monitor his blood pressure randomly with no abnormal readings.    Anxiety/schizophrenia-patient is under the care of comprehensive care where he receives Zyprexa and Elavil.  He denies any thoughts of hurting self or others.      The following portions of the patient's history were reviewed and updated as appropriate: allergies, current medications, past family history, past medical history, past social history, past surgical history and problem list.    Outpatient Medications Prior to Visit   Medication Sig Dispense Refill   • OLANZapine (zyPREXA) 20 MG tablet      • sildenafil (VIAGRA) 50 MG tablet Take 1 tablet by mouth Daily As Needed for erectile dysfunction. 20 tablet 5   • amitriptyline (ELAVIL) 100 MG tablet Take 1 tablet by mouth Every Night. 30 tablet 0   • aspirin  MG tablet Take 1 tablet by mouth Daily. 30 tablet 5   • atenolol (TENORMIN) 25 MG tablet Take 0.5 tablets by mouth Daily. 15 tablet 5   • lisinopril (PRINIVIL,ZESTRIL) 5 MG tablet Take 1 tablet by mouth Daily. 30 tablet 5   • simvastatin (ZOCOR) 40 MG tablet Take 1 tablet by mouth Every Night. 30 tablet 5   • vitamin D (ERGOCALCIFEROL) 32046 units capsule capsule Take 1 capsule by mouth Every 7 (Seven) Days.  4 capsule 5   • albuterol (VENTOLIN HFA) 108 (90 Base) MCG/ACT inhaler Inhale 2 puffs Every 4 (Four) Hours As Needed for Wheezing. 18 g 0     No facility-administered medications prior to visit.        Patient Active Problem List   Diagnosis   • Paranoid schizophrenia (CMS/[facility])   • Depression   • Type 2 diabetes mellitus with hyperglycemia, without long-term current use of insulin (CMS/[facility])   • Hyperlipidemia   • Hypertension   • Anxiety disorder   • Vitamin D deficiency disease   • Drug-induced erectile dysfunction   • Vision changes   • Prostate disorder   • Excessive cerumen in both ear canals   • High risk heterosexual behavior      Visual Acuity Screening    Right eye Left eye Both eyes   Without correction: 20/25 20/25 20/20   With correction:        Hearing adequate per whisper test      Advance Care Planning:  Patient does not have an advance directive - not interested in additional information    Identification of Risk Factors:  Risk factors include: weight , unhealthy diet, cardiovascular risk and depression.    Review of Systems   Constitutional: Negative for appetite change, fatigue and unexpected weight change.   HENT: Negative for congestion, ear pain, nosebleeds, postnasal drip, rhinorrhea, sore throat, trouble swallowing and voice change.    Eyes: Negative for pain and visual disturbance.   Respiratory: Negative for cough, shortness of breath and wheezing.    Cardiovascular: Negative for chest pain and palpitations.   Gastrointestinal: Negative for abdominal pain, blood in stool, constipation and diarrhea.   Endocrine: Negative for cold intolerance and polydipsia.   Genitourinary: Negative for difficulty urinating, flank pain and hematuria.   Musculoskeletal: Negative for arthralgias, back pain, gait problem, joint swelling and myalgias.   Skin: Negative for color change and rash.   Allergic/Immunologic: Negative.    Neurological: Negative for syncope, numbness and headaches.   Hematological:  Negative.    Psychiatric/Behavioral: Negative for dysphoric mood, self-injury, sleep disturbance and suicidal ideas. The patient is nervous/anxious.    All other systems reviewed and are negative.      Compared to one year ago, the patient feels his physical health is better.  Compared to one year ago, the patient feels his mental health is better.    Objective     Physical Exam   Constitutional: He is oriented to person, place, and time. Vital signs are normal. He appears well-developed and well-nourished. No distress.   HENT:   Head: Normocephalic.   Right Ear: External ear normal.   Left Ear: External ear normal.   Nose: Nose normal.   Mouth/Throat: Oropharynx is clear and moist. No oropharyngeal exudate.   Eyes: Conjunctivae, EOM and lids are normal. Pupils are equal, round, and reactive to light. Right eye exhibits no discharge. Left eye exhibits no discharge.   Neck: Normal range of motion. Neck supple. No tracheal deviation present. No thyromegaly present.   Cardiovascular: Normal rate, regular rhythm and normal heart sounds. Exam reveals no gallop and no friction rub.   No murmur heard.  Pulmonary/Chest: Effort normal and breath sounds normal. No respiratory distress. He has no wheezes. He has no rales. He exhibits no tenderness.   Abdominal: Soft. Normal appearance and bowel sounds are normal. He exhibits no distension and no mass. There is no tenderness. There is no rebound and no guarding.   Musculoskeletal: Normal range of motion.   Lymphadenopathy:     He has no cervical adenopathy.   Neurological: He is alert and oriented to person, place, and time. He has normal reflexes.   CN 2-12 grossly intact    Skin: Skin is warm and dry. Capillary refill takes less than 2 seconds. No rash noted. He is not diaphoretic. No erythema.   Psychiatric: He has a normal mood and affect. His speech is normal and behavior is normal. Judgment and thought content normal. Cognition and memory are normal.   Vitals  "reviewed.      Vitals:    05/08/19 1019   BP: 140/80   Pulse: 100   Temp: 98.3 °F (36.8 °C)   TempSrc: Tympanic   SpO2: 98%   Weight: 104 kg (230 lb)   Height: 177.8 cm (70\")       Patient's Body mass index is 33 kg/m². BMI is above normal parameters. Recommendations include: exercise counseling and nutrition counseling.      Assessment/Plan   Patient Self-Management and Personalized Health Advice  The patient has been provided with information about: diet, exercise, weight management, prevention of cardiac or vascular disease, the relationship between weight and GERD and mental health concerns and preventive services including:   · Colorectal cancer screening, colonoscopy referral placed, Counseling for cardiovascular disease risk reduction, Exercise counseling provided, Fall Risk assessment done.    Visit Diagnoses:    ICD-10-CM ICD-9-CM   1. Screening for colon cancer Z12.11 V76.51   2. Vitamin D deficiency disease E55.9 268.9   3. Essential hypertension I10 401.9   4. Mixed hyperlipidemia E78.2 272.2   5. Type 2 diabetes mellitus with hyperglycemia, without long-term current use of insulin (CMS/[facility]) E11.65 250.00     790.29   6. Anxiety disorder due to known physiological condition F06.4 300.00   7. Paranoid schizophrenia (CMS/[facility]) F20.0 295.30       Orders Placed This Encounter   Procedures   • Ambulatory Referral For Screening Colonoscopy     Referral Priority:   Routine     Referral Type:   Diagnostic Medical     Referral Reason:   Specialty Services Required     Number of Visits Requested:   1       Outpatient Encounter Medications as of 5/8/2019   Medication Sig Dispense Refill   • amitriptyline (ELAVIL) 100 MG tablet Take 1 tablet by mouth Every Night. 30 tablet 0   • aspirin  MG tablet Take 1 tablet by mouth Daily. 30 tablet 5   • atenolol (TENORMIN) 25 MG tablet Take 0.5 tablets by mouth Daily. 15 tablet 5   • lisinopril (PRINIVIL,ZESTRIL) 5 MG tablet Take 1 tablet by mouth Daily. 30 tablet 5   • " OLANZapine (zyPREXA) 20 MG tablet      • sildenafil (VIAGRA) 50 MG tablet Take 1 tablet by mouth Daily As Needed for erectile dysfunction. 20 tablet 5   • simvastatin (ZOCOR) 20 MG tablet Take 1 tablet by mouth Every Night. 30 tablet 5   • vitamin D (ERGOCALCIFEROL) 78863 units capsule capsule Take 1 capsule by mouth Every 7 (Seven) Days. 4 capsule 5   • [DISCONTINUED] amitriptyline (ELAVIL) 100 MG tablet Take 1 tablet by mouth Every Night. 30 tablet 0   • [DISCONTINUED] aspirin  MG tablet Take 1 tablet by mouth Daily. 30 tablet 5   • [DISCONTINUED] atenolol (TENORMIN) 25 MG tablet Take 0.5 tablets by mouth Daily. 15 tablet 5   • [DISCONTINUED] lisinopril (PRINIVIL,ZESTRIL) 5 MG tablet Take 1 tablet by mouth Daily. 30 tablet 5   • [DISCONTINUED] simvastatin (ZOCOR) 40 MG tablet Take 1 tablet by mouth Every Night. 30 tablet 5   • [DISCONTINUED] simvastatin (ZOCOR) 40 MG tablet Take 1 tablet by mouth Every Night. 30 tablet 5   • [DISCONTINUED] vitamin D (ERGOCALCIFEROL) 08461 units capsule capsule Take 1 capsule by mouth Every 7 (Seven) Days. 4 capsule 5   • [DISCONTINUED] albuterol (VENTOLIN HFA) 108 (90 Base) MCG/ACT inhaler Inhale 2 puffs Every 4 (Four) Hours As Needed for Wheezing. 18 g 0     No facility-administered encounter medications on file as of 5/8/2019.        Reviewed use of high risk medication in the elderly: yes  Reviewed for potential of harmful drug interactions in the elderly: yes    Age appropriate education and safety instruction has been provided. Preventive education/recommendation > 15 minutes. Safety, accident prevention, vaccines, health maintenance concerns, nutrition, diet, exercise and social activity.     I have discussed diagnosis in detail today allowing time for questions and answers. Pt is aware of reasons to seek urgent or emergent medical care as well as reasons to return to the clinic for evaluation. Possible side effects, interactions and progression of symptoms discussed  as well. Pt / family states understanding.   Emotional support and active listening provided.     Medicare wellness exam has been performed today.  Medication list has been reviewed and discussed with patient.  Recommended preventative and screenings has been discussed with patient.      Pt has been educated/instructed on diabetic care and protocols.  Diabetic diet instructions provided.  Medication regimen reviewed.  Discussed possible side effects and interactions of current medications.  Sick day rules reviewed. Continue to monitor blood sugar and report abnormal readings as discussed today. Understanding stated by patient.       Follow Up:  Return in about 6 months (around 11/8/2019) for Recheck, labs one week prior.     An After Visit Summary and PPPS with all of these plans were given to the patient.

## 2019-08-22 DIAGNOSIS — N52.2 DRUG-INDUCED ERECTILE DYSFUNCTION: ICD-10-CM

## 2019-08-22 RX ORDER — SILDENAFIL CITRATE 20 MG/1
TABLET ORAL
Qty: 20 TABLET | Refills: 0 | Status: SHIPPED | OUTPATIENT
Start: 2019-08-22 | End: 2019-11-27 | Stop reason: SDUPTHER

## 2019-10-31 DIAGNOSIS — E55.9 VITAMIN D DEFICIENCY DISEASE: Primary | ICD-10-CM

## 2019-10-31 DIAGNOSIS — E11.65 TYPE 2 DIABETES MELLITUS WITH HYPERGLYCEMIA, WITHOUT LONG-TERM CURRENT USE OF INSULIN (HCC): ICD-10-CM

## 2019-10-31 DIAGNOSIS — I10 ESSENTIAL HYPERTENSION: ICD-10-CM

## 2019-10-31 LAB
25(OH)D3 SERPL-MCNC: 40.4 NG/ML (ref 30–100)
ALBUMIN SERPL-MCNC: 4.4 G/DL (ref 3.5–5.2)
ALBUMIN UR-MCNC: 7.7 MG/DL
ALBUMIN/GLOB SERPL: 1.4 G/DL
ALP SERPL-CCNC: 84 U/L (ref 39–117)
ALT SERPL W P-5'-P-CCNC: 17 U/L (ref 1–41)
ANION GAP SERPL CALCULATED.3IONS-SCNC: 9.6 MMOL/L (ref 5–15)
AST SERPL-CCNC: 13 U/L (ref 1–40)
BASOPHILS # BLD AUTO: 0.07 10*3/MM3 (ref 0–0.2)
BASOPHILS NFR BLD AUTO: 1.3 % (ref 0–1.5)
BILIRUB SERPL-MCNC: 0.2 MG/DL (ref 0.2–1.2)
BUN BLD-MCNC: 7 MG/DL (ref 6–20)
BUN/CREAT SERPL: 8.2 (ref 7–25)
CALCIUM SPEC-SCNC: 9.6 MG/DL (ref 8.6–10.5)
CHLORIDE SERPL-SCNC: 103 MMOL/L (ref 98–107)
CHOLEST SERPL-MCNC: 222 MG/DL (ref 0–200)
CO2 SERPL-SCNC: 27.4 MMOL/L (ref 22–29)
CREAT BLD-MCNC: 0.85 MG/DL (ref 0.76–1.27)
CREAT UR-MCNC: 22.6 MG/DL
DEPRECATED RDW RBC AUTO: 38.8 FL (ref 37–54)
EOSINOPHIL # BLD AUTO: 0.2 10*3/MM3 (ref 0–0.4)
EOSINOPHIL NFR BLD AUTO: 3.8 % (ref 0.3–6.2)
ERYTHROCYTE [DISTWIDTH] IN BLOOD BY AUTOMATED COUNT: 12.9 % (ref 12.3–15.4)
GFR SERPL CREATININE-BSD FRML MDRD: 95 ML/MIN/1.73
GLOBULIN UR ELPH-MCNC: 3.1 GM/DL
GLUCOSE BLD-MCNC: 113 MG/DL (ref 65–99)
HBA1C MFR BLD: 5.5 % (ref 4.8–5.6)
HCT VFR BLD AUTO: 44.4 % (ref 37.5–51)
HDLC SERPL-MCNC: 45 MG/DL (ref 40–60)
HGB BLD-MCNC: 14.7 G/DL (ref 13–17.7)
IMM GRANULOCYTES # BLD AUTO: 0.02 10*3/MM3 (ref 0–0.05)
IMM GRANULOCYTES NFR BLD AUTO: 0.4 % (ref 0–0.5)
LDLC SERPL CALC-MCNC: 147 MG/DL (ref 0–100)
LDLC/HDLC SERPL: 3.27 {RATIO}
LYMPHOCYTES # BLD AUTO: 1.38 10*3/MM3 (ref 0.7–3.1)
LYMPHOCYTES NFR BLD AUTO: 25.9 % (ref 19.6–45.3)
MCH RBC QN AUTO: 27.6 PG (ref 26.6–33)
MCHC RBC AUTO-ENTMCNC: 33.1 G/DL (ref 31.5–35.7)
MCV RBC AUTO: 83.3 FL (ref 79–97)
MICROALBUMIN/CREAT UR: 340.7 MG/G
MONOCYTES # BLD AUTO: 0.37 10*3/MM3 (ref 0.1–0.9)
MONOCYTES NFR BLD AUTO: 6.9 % (ref 5–12)
NEUTROPHILS # BLD AUTO: 3.29 10*3/MM3 (ref 1.7–7)
NEUTROPHILS NFR BLD AUTO: 61.7 % (ref 42.7–76)
NRBC BLD AUTO-RTO: 0 /100 WBC (ref 0–0.2)
PLATELET # BLD AUTO: 402 10*3/MM3 (ref 140–450)
PMV BLD AUTO: 9.2 FL (ref 6–12)
POTASSIUM BLD-SCNC: 4.3 MMOL/L (ref 3.5–5.2)
PROT SERPL-MCNC: 7.5 G/DL (ref 6–8.5)
RBC # BLD AUTO: 5.33 10*6/MM3 (ref 4.14–5.8)
SODIUM BLD-SCNC: 140 MMOL/L (ref 136–145)
TRIGL SERPL-MCNC: 150 MG/DL (ref 0–150)
TSH SERPL DL<=0.05 MIU/L-ACNC: 1.66 UIU/ML (ref 0.27–4.2)
URATE SERPL-MCNC: 6.2 MG/DL (ref 3.4–7)
VIT B12 BLD-MCNC: 393 PG/ML (ref 211–946)
VLDLC SERPL-MCNC: 30 MG/DL (ref 5–40)
WBC NRBC COR # BLD: 5.33 10*3/MM3 (ref 3.4–10.8)

## 2019-10-31 PROCEDURE — 80061 LIPID PANEL: CPT | Performed by: NURSE PRACTITIONER

## 2019-10-31 PROCEDURE — 85025 COMPLETE CBC W/AUTO DIFF WBC: CPT | Performed by: NURSE PRACTITIONER

## 2019-10-31 PROCEDURE — 82306 VITAMIN D 25 HYDROXY: CPT | Performed by: NURSE PRACTITIONER

## 2019-10-31 PROCEDURE — 84443 ASSAY THYROID STIM HORMONE: CPT | Performed by: NURSE PRACTITIONER

## 2019-10-31 PROCEDURE — 84550 ASSAY OF BLOOD/URIC ACID: CPT | Performed by: NURSE PRACTITIONER

## 2019-10-31 PROCEDURE — 82570 ASSAY OF URINE CREATININE: CPT | Performed by: NURSE PRACTITIONER

## 2019-10-31 PROCEDURE — 82043 UR ALBUMIN QUANTITATIVE: CPT | Performed by: NURSE PRACTITIONER

## 2019-10-31 PROCEDURE — 82607 VITAMIN B-12: CPT | Performed by: NURSE PRACTITIONER

## 2019-10-31 PROCEDURE — 83036 HEMOGLOBIN GLYCOSYLATED A1C: CPT | Performed by: NURSE PRACTITIONER

## 2019-10-31 PROCEDURE — 80053 COMPREHEN METABOLIC PANEL: CPT | Performed by: NURSE PRACTITIONER

## 2019-11-05 ENCOUNTER — OFFICE VISIT (OUTPATIENT)
Dept: FAMILY MEDICINE CLINIC | Facility: CLINIC | Age: 49
End: 2019-11-05

## 2019-11-05 VITALS
SYSTOLIC BLOOD PRESSURE: 130 MMHG | TEMPERATURE: 97.7 F | HEIGHT: 70 IN | DIASTOLIC BLOOD PRESSURE: 90 MMHG | WEIGHT: 202 LBS | OXYGEN SATURATION: 94 % | BODY MASS INDEX: 28.92 KG/M2 | HEART RATE: 80 BPM

## 2019-11-05 DIAGNOSIS — Z12.11 ENCOUNTER FOR SCREENING COLONOSCOPY: Primary | ICD-10-CM

## 2019-11-05 DIAGNOSIS — F20.0 PARANOID SCHIZOPHRENIA (HCC): ICD-10-CM

## 2019-11-05 DIAGNOSIS — L29.9 ITCHING WITH IRRITATION: ICD-10-CM

## 2019-11-05 DIAGNOSIS — E66.3 OVERWEIGHT (BMI 25.0-29.9): ICD-10-CM

## 2019-11-05 DIAGNOSIS — E78.2 MIXED HYPERLIPIDEMIA: ICD-10-CM

## 2019-11-05 DIAGNOSIS — E55.9 VITAMIN D DEFICIENCY DISEASE: ICD-10-CM

## 2019-11-05 PROBLEM — L29.3 ITCHING OF PENIS: Status: ACTIVE | Noted: 2019-11-05

## 2019-11-05 PROBLEM — B88.9: Status: ACTIVE | Noted: 2019-11-05

## 2019-11-05 PROCEDURE — 99214 OFFICE O/P EST MOD 30 MIN: CPT | Performed by: NURSE PRACTITIONER

## 2019-11-05 RX ORDER — NYSTATIN 100000 [USP'U]/G
POWDER TOPICAL 3 TIMES DAILY
Qty: 30 G | Refills: 2 | Status: SHIPPED | OUTPATIENT
Start: 2019-11-05 | End: 2020-02-27

## 2019-11-05 RX ORDER — SIMVASTATIN 40 MG
40 TABLET ORAL NIGHTLY
Qty: 30 TABLET | Refills: 5 | Status: SHIPPED | OUTPATIENT
Start: 2019-11-05 | End: 2020-02-27 | Stop reason: SDUPTHER

## 2019-11-05 RX ORDER — FLUCONAZOLE 150 MG/1
150 TABLET ORAL ONCE
Qty: 1 TABLET | Refills: 0 | Status: SHIPPED | OUTPATIENT
Start: 2019-11-05 | End: 2019-11-05

## 2019-11-05 RX ORDER — PERMETHRIN 50 MG/G
CREAM TOPICAL ONCE
Qty: 60 G | Refills: 0 | Status: SHIPPED | OUTPATIENT
Start: 2019-11-05 | End: 2019-11-05

## 2019-11-05 NOTE — ASSESSMENT & PLAN NOTE
Treat with permethrin cream in the groin area tonight prior to bed.  Wash off in 8 to 10 hours.  Then start nystatin powder.  Patient may take Diflucan x1 for early yeast rash.

## 2019-11-05 NOTE — PROGRESS NOTES
Subjective   Alfonso Barnett is a 49 y.o. male.     Chief Complaint   Patient presents with   • itching in personal area     Go over labs and discuss a personal problem     History of Present Illness:    New problem   Rash on his private area and groin.  Patient reports about a week ago he was painting around the chicken house.  Since that time he has been itching in his groin.  Looks like little tiny bite marks.  Does not hurt but it itches tremendously.  He is tried over-the-counter itch creams without improvement.    Paranoid schizophrenia-patient is under the care of behavioral health at [facility].  He currently receives Elavil  and Zyprexa.  She denies any thoughts of hurting himself or others.    Lipidemia-patient is currently taking Zocor 20 mg daily.  This was a recent decrease from a previous 40 mg dose.  Cholesterol levels do show some increase with that med adjustment.      ROS and History reviewed as accurate per provider    The following portions of the patient's history and ROS were reviewed and updated as appropriate per provider:  Allergies, current medications, past family history, past medical history, past social history, past surgical history and problem list.    Review of Systems   Constitutional: Negative for activity change, appetite change, chills, diaphoresis, fatigue, fever and unexpected weight change.   HENT: Negative for congestion, ear pain, nosebleeds, postnasal drip, rhinorrhea, sore throat, trouble swallowing and voice change.    Eyes: Negative for photophobia, pain, discharge, redness, itching and visual disturbance.   Respiratory: Negative for apnea, cough, choking, chest tightness, shortness of breath, wheezing and stridor.    Cardiovascular: Negative for chest pain and palpitations.   Gastrointestinal: Negative for abdominal distention, abdominal pain, anal bleeding, blood in stool, constipation, diarrhea, nausea and rectal pain.   Endocrine: Negative for cold intolerance,  "heat intolerance, polydipsia, polyphagia and polyuria.   Genitourinary: Negative for difficulty urinating, dysuria, flank pain, frequency, hematuria and urgency.   Musculoskeletal: Negative for arthralgias, back pain, gait problem, joint swelling and myalgias.   Skin: Positive for color change and rash. Negative for pallor and wound.   Allergic/Immunologic: Negative.    Neurological: Negative for syncope, numbness and headaches.   Hematological: Negative.    Psychiatric/Behavioral: Negative for behavioral problems, decreased concentration, dysphoric mood, self-injury, sleep disturbance and suicidal ideas. The patient is not nervous/anxious.    All other systems reviewed and are negative.      Objective     /90   Pulse 80   Temp 97.7 °F (36.5 °C) (Tympanic)   Ht 177.8 cm (70\")   Wt 91.6 kg (202 lb)   SpO2 94%   BMI 28.98 kg/m²   Orders Only on 10/31/2019   Component Date Value Ref Range Status   • Glucose 10/31/2019 113* 65 - 99 mg/dL Final   • BUN 10/31/2019 7  6 - 20 mg/dL Final   • Creatinine 10/31/2019 0.85  0.76 - 1.27 mg/dL Final   • Sodium 10/31/2019 140  136 - 145 mmol/L Final   • Potassium 10/31/2019 4.3  3.5 - 5.2 mmol/L Final   • Chloride 10/31/2019 103  98 - 107 mmol/L Final   • CO2 10/31/2019 27.4  22.0 - 29.0 mmol/L Final   • Calcium 10/31/2019 9.6  8.6 - 10.5 mg/dL Final   • Total Protein 10/31/2019 7.5  6.0 - 8.5 g/dL Final   • Albumin 10/31/2019 4.40  3.50 - 5.20 g/dL Final   • ALT (SGPT) 10/31/2019 17  1 - 41 U/L Final   • AST (SGOT) 10/31/2019 13  1 - 40 U/L Final   • Alkaline Phosphatase 10/31/2019 84  39 - 117 U/L Final   • Total Bilirubin 10/31/2019 0.2  0.2 - 1.2 mg/dL Final   • eGFR Non African Amer 10/31/2019 95  >60 mL/min/1.73 Final   • Globulin 10/31/2019 3.1  gm/dL Final   • A/G Ratio 10/31/2019 1.4  g/dL Final   • BUN/Creatinine Ratio 10/31/2019 8.2  7.0 - 25.0 Final   • Anion Gap 10/31/2019 9.6  5.0 - 15.0 mmol/L Final   • Total Cholesterol 10/31/2019 222* 0 - 200 mg/dL " Final   • Triglycerides 10/31/2019 150  0 - 150 mg/dL Final   • HDL Cholesterol 10/31/2019 45  40 - 60 mg/dL Final   • LDL Cholesterol  10/31/2019 147* 0 - 100 mg/dL Final   • VLDL Cholesterol 10/31/2019 30  5 - 40 mg/dL Final   • LDL/HDL Ratio 10/31/2019 3.27   Final   • TSH 10/31/2019 1.660  0.270 - 4.200 uIU/mL Final   • Hemoglobin A1C 10/31/2019 5.50  4.80 - 5.60 % Final   • Uric Acid 10/31/2019 6.2  3.4 - 7.0 mg/dL Final   • Vitamin B-12 10/31/2019 393  211 - 946 pg/mL Final   • 25 Hydroxy, Vitamin D 10/31/2019 40.4  30.0 - 100.0 ng/ml Final   • Microalbumin/Creatinine Ratio 10/31/2019 340.7  mg/g Final   • Creatinine, Urine 10/31/2019 22.6  mg/dL Final   • Microalbumin, Urine 10/31/2019 7.7  mg/dL Final   • WBC 10/31/2019 5.33  3.40 - 10.80 10*3/mm3 Final   • RBC 10/31/2019 5.33  4.14 - 5.80 10*6/mm3 Final   • Hemoglobin 10/31/2019 14.7  13.0 - 17.7 g/dL Final   • Hematocrit 10/31/2019 44.4  37.5 - 51.0 % Final   • MCV 10/31/2019 83.3  79.0 - 97.0 fL Final   • MCH 10/31/2019 27.6  26.6 - 33.0 pg Final   • MCHC 10/31/2019 33.1  31.5 - 35.7 g/dL Final   • RDW 10/31/2019 12.9  12.3 - 15.4 % Final   • RDW-SD 10/31/2019 38.8  37.0 - 54.0 fl Final   • MPV 10/31/2019 9.2  6.0 - 12.0 fL Final   • Platelets 10/31/2019 402  140 - 450 10*3/mm3 Final   • Neutrophil % 10/31/2019 61.7  42.7 - 76.0 % Final   • Lymphocyte % 10/31/2019 25.9  19.6 - 45.3 % Final   • Monocyte % 10/31/2019 6.9  5.0 - 12.0 % Final   • Eosinophil % 10/31/2019 3.8  0.3 - 6.2 % Final   • Basophil % 10/31/2019 1.3  0.0 - 1.5 % Final   • Immature Grans % 10/31/2019 0.4  0.0 - 0.5 % Final   • Neutrophils, Absolute 10/31/2019 3.29  1.70 - 7.00 10*3/mm3 Final   • Lymphocytes, Absolute 10/31/2019 1.38  0.70 - 3.10 10*3/mm3 Final   • Monocytes, Absolute 10/31/2019 0.37  0.10 - 0.90 10*3/mm3 Final   • Eosinophils, Absolute 10/31/2019 0.20  0.00 - 0.40 10*3/mm3 Final   • Basophils, Absolute 10/31/2019 0.07  0.00 - 0.20 10*3/mm3 Final   • Immature Grans,  Absolute 10/31/2019 0.02  0.00 - 0.05 10*3/mm3 Final   • nRBC 10/31/2019 0.0  0.0 - 0.2 /100 WBC Final       Physical Exam   Constitutional: He is oriented to person, place, and time. Vital signs are normal. He appears well-developed and well-nourished. No distress.   49-year-old male here today to go over labs and with an acute complaint of itching in his groin.  He has around a 30 pound weight loss which was planned over the last 6 months.   HENT:   Head: Normocephalic.   Right Ear: External ear normal.   Left Ear: External ear normal.   Nose: Nose normal.   Mouth/Throat: Oropharynx is clear and moist. No oropharyngeal exudate.   Eyes: Conjunctivae, EOM and lids are normal. Pupils are equal, round, and reactive to light. Right eye exhibits no discharge. Left eye exhibits no discharge.   Neck: Normal range of motion. Neck supple. No tracheal deviation present. No thyromegaly present.   Cardiovascular: Normal rate, regular rhythm and normal heart sounds. Exam reveals no gallop and no friction rub.   No murmur heard.  Pulmonary/Chest: Effort normal and breath sounds normal. No respiratory distress. He has no wheezes. He has no rales. He exhibits no tenderness.   Abdominal: Soft. Normal appearance and bowel sounds are normal. He exhibits no distension and no mass. There is no tenderness. There is no rebound and no guarding.   Musculoskeletal: Normal range of motion.   Lymphadenopathy:     He has no cervical adenopathy.   Neurological: He is alert and oriented to person, place, and time. He has normal reflexes.   CN 2-12 grossly intact    Skin: Skin is warm and dry. Capillary refill takes less than 2 seconds. Rash (groin, burrows ) noted. He is not diaphoretic. There is erythema (mild in groin ).   Psychiatric: He has a normal mood and affect. His speech is normal and behavior is normal. Judgment and thought content normal. Cognition and memory are normal.   Vitals reviewed.      Assessment/Plan     Problem List Items  Addressed This Visit        Cardiovascular and Mediastinum    Hyperlipidemia    Overview     Lab Results   Component Value Date    CHOL 222 (H) 10/31/2019    CHLPL 181 03/23/2016    TRIG 150 10/31/2019    HDL 45 10/31/2019     (H) 10/31/2019              Current Assessment & Plan     Increase Zocor back up to 40 mg nightly.  Repeat lipid panel in 3-6 months.         Relevant Medications    simvastatin (ZOCOR) 40 MG tablet       Digestive    Vitamin D deficiency disease    Current Assessment & Plan     Stable with weekly supplementation.  Continue vitamin D 50,000 IU weekly            Nervous and Auditory    Itching with irritation    Current Assessment & Plan     Treat with permethrin cream in the groin area tonight prior to bed.  Wash off in 8 to 10 hours.  Then start nystatin powder.  Patient may take Diflucan x1 for early yeast rash.         Relevant Medications    nystatin (MYCOSTATIN) 216993 UNIT/GM powder    permethrin (ELIMITE) 5 % cream       Other    Paranoid schizophrenia (CMS/HCC)    Current Assessment & Plan     Pain under the care of behavioral health.         Overweight (BMI 25.0-29.9)    Current Assessment & Plan     Obesity is improving with lifestyle modifications.  Discussed the patient's BMI.  The BMI is above average; BMI management plan is completed.  General weight loss/lifestyle modification strategies discussed (elicit support from others; identify saboteurs; non-food rewards, etc).  Informal exercise measures discussed, e.g. taking stairs instead of elevator.           Other Visit Diagnoses     Encounter for screening colonoscopy    -  Primary    Relevant Orders    Ambulatory Referral For Screening Colonoscopy (Completed)          Recent labs reviewed and discussed.  I recommend patient have a screening colonoscopy due to age over 50.  Patient agrees.       Patient's Body mass index is 28.98 kg/m². BMI is above normal parameters. Recommendations include: exercise counseling and  nutrition counseling.        I have discussed diagnosis in detail today allowing time for questions and answers. Patient is aware of reasons to seek urgent or emergent medical care as well as reasons to return to the clinic for evaluation. Possible side effects, interactions and progression of symptoms discussed as well. Patient / family states understanding.   Emotional support and active listening provided.     Follow-up in 1 week for reassessment of rash.  Sooner if needed.      Dictated utilizing Dragon dictation. Errors in dictation may reflect use of voice recognition software and not all errors in transcription may have been detected prior to signing.           This document has been electronically signed by:  ADOLFO Valero, NP-C

## 2019-11-13 ENCOUNTER — OFFICE VISIT (OUTPATIENT)
Dept: FAMILY MEDICINE CLINIC | Facility: CLINIC | Age: 49
End: 2019-11-13

## 2019-11-13 VITALS
TEMPERATURE: 97.3 F | SYSTOLIC BLOOD PRESSURE: 130 MMHG | DIASTOLIC BLOOD PRESSURE: 80 MMHG | HEIGHT: 70 IN | HEART RATE: 103 BPM | WEIGHT: 204 LBS | OXYGEN SATURATION: 98 % | BODY MASS INDEX: 29.2 KG/M2

## 2019-11-13 DIAGNOSIS — F51.01 PRIMARY INSOMNIA: ICD-10-CM

## 2019-11-13 DIAGNOSIS — E66.3 OVERWEIGHT (BMI 25.0-29.9): Primary | ICD-10-CM

## 2019-11-13 DIAGNOSIS — L29.9 ITCHING WITH IRRITATION: ICD-10-CM

## 2019-11-13 PROBLEM — B88.9: Status: RESOLVED | Noted: 2019-11-05 | Resolved: 2019-11-13

## 2019-11-13 PROCEDURE — 99214 OFFICE O/P EST MOD 30 MIN: CPT | Performed by: NURSE PRACTITIONER

## 2019-11-13 RX ORDER — MIRTAZAPINE 15 MG/1
TABLET, FILM COATED ORAL
Qty: 30 TABLET | Refills: 5 | Status: SHIPPED | OUTPATIENT
Start: 2019-11-13 | End: 2019-11-27

## 2019-11-13 NOTE — PROGRESS NOTES
Subjective   Alfonso Barnett is a 49 y.o. male.     No chief complaint on file.    Anxiety     Rash    Not sleeping       History of Present Illness:    Not sleeping well-currently takes Zyprexa and Elavil.  Has failed Restoril, trazodone and Vistaril.  Patient has difficulty going to sleep and staying asleep.  He is under the care of comprehensive care for paranoid schizophrenia.  He is stable with treatment.    Rash- much improved with treatment provided last week.  Not itching anymore.    Anxiety- well controlled with current medication regimen.  Denies thoughts of hurting self or others.    ROS and History reviewed as accurate per provider      The following portions of the patient's history and ROS were reviewed and updated as appropriate per provider:  Allergies, current medications, past family history, past medical history, past social history, past surgical history and problem list.    Review of Systems   Constitutional: Negative for activity change, appetite change, chills, diaphoresis, fatigue, fever and unexpected weight change.   HENT: Negative for congestion, drooling, ear discharge, ear pain, mouth sores, nosebleeds, postnasal drip, rhinorrhea, sinus pressure, sinus pain, sore throat, trouble swallowing and voice change.    Eyes: Negative for pain, discharge and visual disturbance.   Respiratory: Negative for cough, choking, chest tightness, shortness of breath, wheezing and stridor.    Cardiovascular: Negative for chest pain, palpitations and leg swelling.   Gastrointestinal: Negative for abdominal pain, anal bleeding, blood in stool, constipation, diarrhea, nausea, rectal pain and vomiting.   Endocrine: Negative for cold intolerance, heat intolerance, polydipsia, polyphagia and polyuria.   Genitourinary: Negative for difficulty urinating, dysuria, flank pain, frequency, hematuria and urgency.   Musculoskeletal: Negative for arthralgias, back pain, gait problem, joint swelling, myalgias, neck pain and  "neck stiffness.   Skin: Positive for rash. Negative for color change.   Allergic/Immunologic: Negative.  Negative for environmental allergies, food allergies and immunocompromised state.   Neurological: Negative for syncope, numbness and headaches.   Hematological: Negative.    Psychiatric/Behavioral: Positive for sleep disturbance. Negative for behavioral problems, dysphoric mood, self-injury and suicidal ideas. The patient is not nervous/anxious.    All other systems reviewed and are negative.      Objective     /80   Pulse 103   Temp 97.3 °F (36.3 °C) (Tympanic)   Ht 177.8 cm (70\")   Wt 92.5 kg (204 lb)   SpO2 98%   BMI 29.27 kg/m²   Orders Only on 10/31/2019   Component Date Value Ref Range Status   • Glucose 10/31/2019 113* 65 - 99 mg/dL Final   • BUN 10/31/2019 7  6 - 20 mg/dL Final   • Creatinine 10/31/2019 0.85  0.76 - 1.27 mg/dL Final   • Sodium 10/31/2019 140  136 - 145 mmol/L Final   • Potassium 10/31/2019 4.3  3.5 - 5.2 mmol/L Final   • Chloride 10/31/2019 103  98 - 107 mmol/L Final   • CO2 10/31/2019 27.4  22.0 - 29.0 mmol/L Final   • Calcium 10/31/2019 9.6  8.6 - 10.5 mg/dL Final   • Total Protein 10/31/2019 7.5  6.0 - 8.5 g/dL Final   • Albumin 10/31/2019 4.40  3.50 - 5.20 g/dL Final   • ALT (SGPT) 10/31/2019 17  1 - 41 U/L Final   • AST (SGOT) 10/31/2019 13  1 - 40 U/L Final   • Alkaline Phosphatase 10/31/2019 84  39 - 117 U/L Final   • Total Bilirubin 10/31/2019 0.2  0.2 - 1.2 mg/dL Final   • eGFR Non African Amer 10/31/2019 95  >60 mL/min/1.73 Final   • Globulin 10/31/2019 3.1  gm/dL Final   • A/G Ratio 10/31/2019 1.4  g/dL Final   • BUN/Creatinine Ratio 10/31/2019 8.2  7.0 - 25.0 Final   • Anion Gap 10/31/2019 9.6  5.0 - 15.0 mmol/L Final   • Total Cholesterol 10/31/2019 222* 0 - 200 mg/dL Final   • Triglycerides 10/31/2019 150  0 - 150 mg/dL Final   • HDL Cholesterol 10/31/2019 45  40 - 60 mg/dL Final   • LDL Cholesterol  10/31/2019 147* 0 - 100 mg/dL Final   • VLDL Cholesterol " 10/31/2019 30  5 - 40 mg/dL Final   • LDL/HDL Ratio 10/31/2019 3.27   Final   • TSH 10/31/2019 1.660  0.270 - 4.200 uIU/mL Final   • Hemoglobin A1C 10/31/2019 5.50  4.80 - 5.60 % Final   • Uric Acid 10/31/2019 6.2  3.4 - 7.0 mg/dL Final   • Vitamin B-12 10/31/2019 393  211 - 946 pg/mL Final   • 25 Hydroxy, Vitamin D 10/31/2019 40.4  30.0 - 100.0 ng/ml Final   • Microalbumin/Creatinine Ratio 10/31/2019 340.7  mg/g Final   • Creatinine, Urine 10/31/2019 22.6  mg/dL Final   • Microalbumin, Urine 10/31/2019 7.7  mg/dL Final   • WBC 10/31/2019 5.33  3.40 - 10.80 10*3/mm3 Final   • RBC 10/31/2019 5.33  4.14 - 5.80 10*6/mm3 Final   • Hemoglobin 10/31/2019 14.7  13.0 - 17.7 g/dL Final   • Hematocrit 10/31/2019 44.4  37.5 - 51.0 % Final   • MCV 10/31/2019 83.3  79.0 - 97.0 fL Final   • MCH 10/31/2019 27.6  26.6 - 33.0 pg Final   • MCHC 10/31/2019 33.1  31.5 - 35.7 g/dL Final   • RDW 10/31/2019 12.9  12.3 - 15.4 % Final   • RDW-SD 10/31/2019 38.8  37.0 - 54.0 fl Final   • MPV 10/31/2019 9.2  6.0 - 12.0 fL Final   • Platelets 10/31/2019 402  140 - 450 10*3/mm3 Final   • Neutrophil % 10/31/2019 61.7  42.7 - 76.0 % Final   • Lymphocyte % 10/31/2019 25.9  19.6 - 45.3 % Final   • Monocyte % 10/31/2019 6.9  5.0 - 12.0 % Final   • Eosinophil % 10/31/2019 3.8  0.3 - 6.2 % Final   • Basophil % 10/31/2019 1.3  0.0 - 1.5 % Final   • Immature Grans % 10/31/2019 0.4  0.0 - 0.5 % Final   • Neutrophils, Absolute 10/31/2019 3.29  1.70 - 7.00 10*3/mm3 Final   • Lymphocytes, Absolute 10/31/2019 1.38  0.70 - 3.10 10*3/mm3 Final   • Monocytes, Absolute 10/31/2019 0.37  0.10 - 0.90 10*3/mm3 Final   • Eosinophils, Absolute 10/31/2019 0.20  0.00 - 0.40 10*3/mm3 Final   • Basophils, Absolute 10/31/2019 0.07  0.00 - 0.20 10*3/mm3 Final   • Immature Grans, Absolute 10/31/2019 0.02  0.00 - 0.05 10*3/mm3 Final   • nRBC 10/31/2019 0.0  0.0 - 0.2 /100 WBC Final       Physical Exam   Constitutional: He is oriented to person, place, and time. Vital signs  are normal. He appears well-developed and well-nourished. No distress.   Alfonso Barnett is a 49-year-old male well-known to me.  He is very talkative and friendly today.  Shows no signs of being paranoid or having exacerbation of anxiety.   HENT:   Head: Normocephalic.   Right Ear: External ear normal.   Left Ear: External ear normal.   Nose: Nose normal.   Mouth/Throat: Oropharynx is clear and moist. No oropharyngeal exudate.   Eyes: Conjunctivae, EOM and lids are normal. Pupils are equal, round, and reactive to light. Right eye exhibits no discharge. Left eye exhibits no discharge.   Neck: Normal range of motion. Neck supple. No tracheal deviation present. No thyromegaly present.   Cardiovascular: Normal rate, regular rhythm and normal heart sounds. Exam reveals no gallop and no friction rub.   No murmur heard.  Pulmonary/Chest: Effort normal and breath sounds normal. No respiratory distress. He has no wheezes. He has no rales. He exhibits no tenderness.   Abdominal: Soft. Normal appearance and bowel sounds are normal. He exhibits no distension and no mass. There is no tenderness. There is no rebound and no guarding.   Musculoskeletal: Normal range of motion.   Lymphadenopathy:     He has no cervical adenopathy.   Neurological: He is alert and oriented to person, place, and time. He has normal reflexes.   CN 2-12 grossly intact    Skin: Skin is warm and dry. Capillary refill takes less than 2 seconds. Rash noted. He is not diaphoretic. No erythema.        Psychiatric: He has a normal mood and affect. His speech is normal and behavior is normal. Judgment and thought content normal. Cognition and memory are normal.   Vitals reviewed.      Assessment/Plan     Problem List Items Addressed This Visit        Nervous and Auditory    Itching with irritation    Current Assessment & Plan     Continue nystatin powder 3 times a day as needed.            Other    Overweight (BMI 25.0-29.9) - Primary    Current Assessment & Plan      Obesity is unchanged.  Fluctuates up or down a few pounds.  Discussed the patient's BMI.  The BMI is above average; BMI management plan is completed.  General weight loss/lifestyle modification strategies discussed (elicit support from others; identify saboteurs; non-food rewards, etc).  Informal exercise measures discussed, e.g. taking stairs instead of elevator.  Regular aerobic exercise program discussed.         Primary insomnia    Current Assessment & Plan     We will add Remeron 15 mg 30 minutes prior to bedtime.  I have discussed possible interactions and side effects of this medication.  Patient states understanding.  He will remain under the care of [facility] for behavioral health counseling.  Patient will follow-up with me in 2-4 weeks, sooner if needed.                  Patient's Body mass index is 29.27 kg/m². BMI is above normal parameters. Recommendations include: exercise counseling and nutrition counseling.      I have discussed diagnosis in detail today allowing time for questions and answers. Patient is aware of reasons to seek urgent or emergent medical care as well as reasons to return to the clinic for evaluation. Possible side effects, interactions and progression of symptoms discussed as well. Patient / family states understanding.   Emotional support and active listening provided.       Started new medication.  Discussed sleep habit and ritual.  Avoid stimulation for 2 hours prior to bedtime.      Follow-up in 2-4 weeks, sooner if needed.      Dictated utilizing Dragon dictation. Errors in dictation may reflect use of voice recognition software and not all errors in transcription may have been detected prior to signing.           This document has been electronically signed by:  ADOLFO Valero, NP-C

## 2019-11-13 NOTE — ASSESSMENT & PLAN NOTE
Obesity is unchanged.  Fluctuates up or down a few pounds.  Discussed the patient's BMI.  The BMI is above average; BMI management plan is completed.  General weight loss/lifestyle modification strategies discussed (elicit support from others; identify saboteurs; non-food rewards, etc).  Informal exercise measures discussed, e.g. taking stairs instead of elevator.  Regular aerobic exercise program discussed.

## 2019-11-13 NOTE — ASSESSMENT & PLAN NOTE
We will add Remeron 15 mg 30 minutes prior to bedtime.  I have discussed possible interactions and side effects of this medication.  Patient states understanding.  He will remain under the care of [facility] for behavioral health counseling.  Patient will follow-up with me in 2-4 weeks, sooner if needed.

## 2019-11-27 ENCOUNTER — OFFICE VISIT (OUTPATIENT)
Dept: FAMILY MEDICINE CLINIC | Facility: CLINIC | Age: 49
End: 2019-11-27

## 2019-11-27 VITALS
WEIGHT: 202 LBS | SYSTOLIC BLOOD PRESSURE: 120 MMHG | OXYGEN SATURATION: 98 % | TEMPERATURE: 97.8 F | BODY MASS INDEX: 28.92 KG/M2 | DIASTOLIC BLOOD PRESSURE: 80 MMHG | HEART RATE: 100 BPM | HEIGHT: 70 IN

## 2019-11-27 DIAGNOSIS — Z01.00 ENCOUNTER FOR VISION SCREENING: Primary | ICD-10-CM

## 2019-11-27 DIAGNOSIS — N52.2 DRUG-INDUCED ERECTILE DYSFUNCTION: ICD-10-CM

## 2019-11-27 DIAGNOSIS — F51.01 PRIMARY INSOMNIA: ICD-10-CM

## 2019-11-27 DIAGNOSIS — I10 ESSENTIAL HYPERTENSION: ICD-10-CM

## 2019-11-27 DIAGNOSIS — E66.3 OVERWEIGHT (BMI 25.0-29.9): ICD-10-CM

## 2019-11-27 PROCEDURE — 99214 OFFICE O/P EST MOD 30 MIN: CPT | Performed by: NURSE PRACTITIONER

## 2019-11-27 RX ORDER — SILDENAFIL CITRATE 20 MG/1
20 TABLET ORAL DAILY PRN
Qty: 20 TABLET | Refills: 5 | Status: SHIPPED | OUTPATIENT
Start: 2019-11-27 | End: 2020-02-27

## 2019-11-27 NOTE — PROGRESS NOTES
Subjective   Alfonso Barnett is a 49 y.o. male.     Chief Complaint   Patient presents with   • Insomnia   • Erectile Dysfunction       History of Present Illness:    Insomnia-Remeron did not help him sleep.  Patient states that he slept better with Zyprexa.  He is currently under the care of [facility].  He is scheduled to follow-up next month.    Erectile dysfunction-needs refill on Viagra.  Reports that he is able to maintain and keep an erection with use of Viagra.  Understands possible side effects.  Patient denies any side effects.    Hypertension-stable with atenolol and lisinopril.    Patient had recent colonoscopy at the [facility] and is wondering about the results.    Rash-resolved with recent treatment.    Review of systems and patient history including allergies, current meds, family, medical, social, surgical and problem list has been reviewed by the provider.      The following portions of the patient's history and ROS were reviewed and updated as appropriate per provider:  Allergies, current medications, past family history, past medical history, past social history, past surgical history and problem list.    Review of Systems   Constitutional: Negative for activity change, appetite change, diaphoresis, fatigue and unexpected weight change.   HENT: Negative for congestion, ear pain, nosebleeds, postnasal drip, rhinorrhea, sore throat, trouble swallowing and voice change.    Eyes: Negative for pain and visual disturbance.   Respiratory: Negative for cough, chest tightness, shortness of breath and wheezing.    Cardiovascular: Negative for chest pain and palpitations.   Gastrointestinal: Negative for abdominal pain, blood in stool, constipation and diarrhea.   Endocrine: Negative for cold intolerance and polydipsia.   Genitourinary: Negative for difficulty urinating, dysuria, flank pain, hematuria, penile pain, scrotal swelling, testicular pain and urgency.   Musculoskeletal: Positive for  "arthralgias. Negative for back pain, gait problem, joint swelling and myalgias.   Skin: Negative for color change, pallor, rash and wound.   Allergic/Immunologic: Negative.    Neurological: Negative for syncope, numbness and headaches.   Hematological: Negative.    Psychiatric/Behavioral: Positive for sleep disturbance. Negative for dysphoric mood, self-injury and suicidal ideas. The patient is nervous/anxious.    All other systems reviewed and are negative.      Objective     /80   Pulse 100   Temp 97.8 °F (36.6 °C) (Tympanic)   Ht 177.8 cm (70\")   Wt 91.6 kg (202 lb)   SpO2 98%   BMI 28.98 kg/m²   Orders Only on 10/31/2019   Component Date Value Ref Range Status   • Glucose 10/31/2019 113* 65 - 99 mg/dL Final   • BUN 10/31/2019 7  6 - 20 mg/dL Final   • Creatinine 10/31/2019 0.85  0.76 - 1.27 mg/dL Final   • Sodium 10/31/2019 140  136 - 145 mmol/L Final   • Potassium 10/31/2019 4.3  3.5 - 5.2 mmol/L Final   • Chloride 10/31/2019 103  98 - 107 mmol/L Final   • CO2 10/31/2019 27.4  22.0 - 29.0 mmol/L Final   • Calcium 10/31/2019 9.6  8.6 - 10.5 mg/dL Final   • Total Protein 10/31/2019 7.5  6.0 - 8.5 g/dL Final   • Albumin 10/31/2019 4.40  3.50 - 5.20 g/dL Final   • ALT (SGPT) 10/31/2019 17  1 - 41 U/L Final   • AST (SGOT) 10/31/2019 13  1 - 40 U/L Final   • Alkaline Phosphatase 10/31/2019 84  39 - 117 U/L Final   • Total Bilirubin 10/31/2019 0.2  0.2 - 1.2 mg/dL Final   • eGFR Non African Amer 10/31/2019 95  >60 mL/min/1.73 Final   • Globulin 10/31/2019 3.1  gm/dL Final   • A/G Ratio 10/31/2019 1.4  g/dL Final   • BUN/Creatinine Ratio 10/31/2019 8.2  7.0 - 25.0 Final   • Anion Gap 10/31/2019 9.6  5.0 - 15.0 mmol/L Final   • Total Cholesterol 10/31/2019 222* 0 - 200 mg/dL Final   • Triglycerides 10/31/2019 150  0 - 150 mg/dL Final   • HDL Cholesterol 10/31/2019 45  40 - 60 mg/dL Final   • LDL Cholesterol  10/31/2019 147* 0 - 100 mg/dL Final   • VLDL Cholesterol 10/31/2019 30  5 - 40 mg/dL Final   • " LDL/HDL Ratio 10/31/2019 3.27   Final   • TSH 10/31/2019 1.660  0.270 - 4.200 uIU/mL Final   • Hemoglobin A1C 10/31/2019 5.50  4.80 - 5.60 % Final   • Uric Acid 10/31/2019 6.2  3.4 - 7.0 mg/dL Final   • Vitamin B-12 10/31/2019 393  211 - 946 pg/mL Final   • 25 Hydroxy, Vitamin D 10/31/2019 40.4  30.0 - 100.0 ng/ml Final   • Microalbumin/Creatinine Ratio 10/31/2019 340.7  mg/g Final   • Creatinine, Urine 10/31/2019 22.6  mg/dL Final   • Microalbumin, Urine 10/31/2019 7.7  mg/dL Final   • WBC 10/31/2019 5.33  3.40 - 10.80 10*3/mm3 Final   • RBC 10/31/2019 5.33  4.14 - 5.80 10*6/mm3 Final   • Hemoglobin 10/31/2019 14.7  13.0 - 17.7 g/dL Final   • Hematocrit 10/31/2019 44.4  37.5 - 51.0 % Final   • MCV 10/31/2019 83.3  79.0 - 97.0 fL Final   • MCH 10/31/2019 27.6  26.6 - 33.0 pg Final   • MCHC 10/31/2019 33.1  31.5 - 35.7 g/dL Final   • RDW 10/31/2019 12.9  12.3 - 15.4 % Final   • RDW-SD 10/31/2019 38.8  37.0 - 54.0 fl Final   • MPV 10/31/2019 9.2  6.0 - 12.0 fL Final   • Platelets 10/31/2019 402  140 - 450 10*3/mm3 Final   • Neutrophil % 10/31/2019 61.7  42.7 - 76.0 % Final   • Lymphocyte % 10/31/2019 25.9  19.6 - 45.3 % Final   • Monocyte % 10/31/2019 6.9  5.0 - 12.0 % Final   • Eosinophil % 10/31/2019 3.8  0.3 - 6.2 % Final   • Basophil % 10/31/2019 1.3  0.0 - 1.5 % Final   • Immature Grans % 10/31/2019 0.4  0.0 - 0.5 % Final   • Neutrophils, Absolute 10/31/2019 3.29  1.70 - 7.00 10*3/mm3 Final   • Lymphocytes, Absolute 10/31/2019 1.38  0.70 - 3.10 10*3/mm3 Final   • Monocytes, Absolute 10/31/2019 0.37  0.10 - 0.90 10*3/mm3 Final   • Eosinophils, Absolute 10/31/2019 0.20  0.00 - 0.40 10*3/mm3 Final   • Basophils, Absolute 10/31/2019 0.07  0.00 - 0.20 10*3/mm3 Final   • Immature Grans, Absolute 10/31/2019 0.02  0.00 - 0.05 10*3/mm3 Final   • nRBC 10/31/2019 0.0  0.0 - 0.2 /100 WBC Final       Physical Exam   Constitutional: He is oriented to person, place, and time. Vital signs are normal. He appears well-developed  and well-nourished. No distress.   Very pleasant 49-year-old male in no acute distress.  Looking forward to Thanksgiving holiday.   HENT:   Head: Normocephalic.   Right Ear: External ear normal.   Left Ear: External ear normal.   Nose: Nose normal.   Mouth/Throat: Oropharynx is clear and moist. No oropharyngeal exudate.   Eyes: Conjunctivae, EOM and lids are normal. Pupils are equal, round, and reactive to light. Right eye exhibits no discharge. Left eye exhibits no discharge.   Neck: Normal range of motion. Neck supple. No tracheal deviation present. No thyromegaly present.   Cardiovascular: Normal rate, regular rhythm and normal heart sounds. Exam reveals no gallop and no friction rub.   No murmur heard.  Pulmonary/Chest: Effort normal and breath sounds normal. No respiratory distress. He has no wheezes. He has no rales. He exhibits no tenderness.   Abdominal: Soft. Normal appearance and bowel sounds are normal. He exhibits no distension and no mass. There is no tenderness. There is no rebound and no guarding.   Musculoskeletal: Normal range of motion.   Lymphadenopathy:     He has no cervical adenopathy.   Neurological: He is alert and oriented to person, place, and time. He has normal reflexes.   CN 2-12 grossly intact    Skin: Skin is warm and dry. Capillary refill takes less than 2 seconds. No rash noted. He is not diaphoretic. No erythema.   Psychiatric: He has a normal mood and affect. His speech is normal and behavior is normal. Judgment and thought content normal. Cognition and memory are normal.   Vitals reviewed.      Assessment/Plan     Problem List Items Addressed This Visit        Cardiovascular and Mediastinum    Hypertension    Current Assessment & Plan     Stable with atenolol and lisinopril            Genitourinary    Drug-induced erectile dysfunction    Overview     Due to psychotropic medication           Current Assessment & Plan     Viagra-sildenafil prescription divided.  Discussed possible  side effects.         Relevant Medications    sildenafil (REVATIO) 20 MG tablet       Other    Overweight (BMI 25.0-29.9)    Current Assessment & Plan     Obesity is  stable.  Discussed the patient's BMI.  The BMI is above average; BMI management plan is completed.  General weight loss/lifestyle modification strategies discussed (elicit support from others; identify saboteurs; non-food rewards, etc).         Primary insomnia    Current Assessment & Plan     Stop Remeron.  Sleep habitus discussed.  Remain under the care of behavioral health.             Other Visit Diagnoses     Encounter for vision screening    -  Primary    Relevant Orders    Ambulatory Referral to Optometry            Called for colonoscopy results from [facility]. Reviewed as normal preliminary findings.       Patient's Body mass index is 28.98 kg/m². BMI is above normal parameters. Recommendations include: exercise counseling and nutrition counseling.          I have discussed diagnosis in detail today allowing time for questions and answers. Patient is aware of reasons to seek urgent or emergent medical care as well as reasons to return to the clinic for evaluation. Possible side effects, interactions and progression of symptoms discussed as well. Patient / family states understanding.   Emotional support and active listening provided.       Follow up in 3 months. Routine labs fasting one week prior to next office visit. Return sooner if needed.       Dictated utilizing Dragon dictation. Errors in dictation may reflect use of voice recognition software and not all errors in transcription may have been detected prior to signing.           This document has been electronically signed by:  ADOLFO Valero, NP-C

## 2019-11-27 NOTE — ASSESSMENT & PLAN NOTE
Obesity is  stable.  Discussed the patient's BMI.  The BMI is above average; BMI management plan is completed.  General weight loss/lifestyle modification strategies discussed (elicit support from others; identify saboteurs; non-food rewards, etc).

## 2020-02-18 DIAGNOSIS — E55.9 VITAMIN D DEFICIENCY DISEASE: ICD-10-CM

## 2020-02-18 DIAGNOSIS — I10 ESSENTIAL HYPERTENSION: ICD-10-CM

## 2020-02-18 RX ORDER — LISINOPRIL 5 MG/1
TABLET ORAL
Qty: 30 TABLET | Refills: 5 | Status: SHIPPED | OUTPATIENT
Start: 2020-02-18 | End: 2020-02-27 | Stop reason: SDUPTHER

## 2020-02-18 RX ORDER — ERGOCALCIFEROL 1.25 MG/1
CAPSULE ORAL
Qty: 4 CAPSULE | Refills: 5 | Status: SHIPPED | OUTPATIENT
Start: 2020-02-18 | End: 2020-02-27 | Stop reason: SDUPTHER

## 2020-02-27 ENCOUNTER — OFFICE VISIT (OUTPATIENT)
Dept: FAMILY MEDICINE CLINIC | Facility: CLINIC | Age: 50
End: 2020-02-27

## 2020-02-27 VITALS
WEIGHT: 205 LBS | OXYGEN SATURATION: 98 % | HEART RATE: 100 BPM | HEIGHT: 70 IN | TEMPERATURE: 98.2 F | BODY MASS INDEX: 29.35 KG/M2 | SYSTOLIC BLOOD PRESSURE: 140 MMHG | DIASTOLIC BLOOD PRESSURE: 80 MMHG

## 2020-02-27 DIAGNOSIS — E66.3 OVERWEIGHT (BMI 25.0-29.9): ICD-10-CM

## 2020-02-27 DIAGNOSIS — I10 ESSENTIAL HYPERTENSION: ICD-10-CM

## 2020-02-27 DIAGNOSIS — R79.9 ABNORMAL FINDING OF BLOOD CHEMISTRY, UNSPECIFIED: ICD-10-CM

## 2020-02-27 DIAGNOSIS — Z12.5 PROSTATE CANCER SCREENING: ICD-10-CM

## 2020-02-27 DIAGNOSIS — F20.0 PARANOID SCHIZOPHRENIA (HCC): Primary | ICD-10-CM

## 2020-02-27 DIAGNOSIS — F51.01 PRIMARY INSOMNIA: ICD-10-CM

## 2020-02-27 DIAGNOSIS — E78.2 MIXED HYPERLIPIDEMIA: ICD-10-CM

## 2020-02-27 DIAGNOSIS — E55.9 VITAMIN D DEFICIENCY DISEASE: ICD-10-CM

## 2020-02-27 PROCEDURE — 99214 OFFICE O/P EST MOD 30 MIN: CPT | Performed by: NURSE PRACTITIONER

## 2020-02-27 RX ORDER — SIMVASTATIN 40 MG
40 TABLET ORAL NIGHTLY
Qty: 30 TABLET | Refills: 5 | Status: SHIPPED | OUTPATIENT
Start: 2020-02-27 | End: 2020-06-01 | Stop reason: SDUPTHER

## 2020-02-27 RX ORDER — ASPIRIN 325 MG
325 TABLET, DELAYED RELEASE (ENTERIC COATED) ORAL DAILY
Qty: 30 TABLET | Refills: 5 | Status: SHIPPED | OUTPATIENT
Start: 2020-02-27 | End: 2020-06-01 | Stop reason: SDUPTHER

## 2020-02-27 RX ORDER — ERGOCALCIFEROL 1.25 MG/1
50000 CAPSULE ORAL WEEKLY
Qty: 4 CAPSULE | Refills: 5 | Status: SHIPPED | OUTPATIENT
Start: 2020-02-27 | End: 2020-06-01 | Stop reason: SDUPTHER

## 2020-02-27 RX ORDER — ATENOLOL 25 MG/1
12.5 TABLET ORAL 2 TIMES DAILY
Qty: 30 TABLET | Refills: 5 | Status: SHIPPED | OUTPATIENT
Start: 2020-02-27 | End: 2020-06-01 | Stop reason: SDUPTHER

## 2020-02-27 RX ORDER — LISINOPRIL 5 MG/1
5 TABLET ORAL DAILY
Qty: 30 TABLET | Refills: 5 | Status: SHIPPED | OUTPATIENT
Start: 2020-02-27 | End: 2020-06-01 | Stop reason: DRUGHIGH

## 2020-02-27 RX ORDER — ATENOLOL 25 MG/1
12.5 TABLET ORAL 2 TIMES DAILY
Qty: 30 TABLET | Refills: 5 | Status: SHIPPED | OUTPATIENT
Start: 2020-02-27 | End: 2020-02-27 | Stop reason: SDUPTHER

## 2020-02-27 RX ORDER — LISINOPRIL 5 MG/1
5 TABLET ORAL DAILY
Qty: 30 TABLET | Refills: 5 | Status: SHIPPED | OUTPATIENT
Start: 2020-02-27 | End: 2020-02-27 | Stop reason: SDUPTHER

## 2020-02-27 RX ORDER — ASPIRIN 325 MG
325 TABLET, DELAYED RELEASE (ENTERIC COATED) ORAL DAILY
Qty: 30 TABLET | Refills: 5 | Status: SHIPPED | OUTPATIENT
Start: 2020-02-27 | End: 2020-02-27 | Stop reason: SDUPTHER

## 2020-02-27 NOTE — PROGRESS NOTES
Subjective   Alfonso Barnett is a 49 y.o. male.     Not sleeping well  And refills    History of Present Illness:     Patient is not sleeping well.  He is under the care of [facility].  He is a paranoid schizophrenic.  He has severe insomnia.  She would like to come off his Zyprexa.    Vitamin D deficiency-stable with supplementation    Hyperlipidemia-stable with Zocor and diet changes    Hypertension-stable with lisinopril and atenolol.  Patient does have some elevated heart rate at 100 today.  He has not had his atenolol as he only takes it once daily.    Overweight-BMI is 29.41.  Patient's weight fluctuates up and down.      The following portions of the patient's history and ROS were reviewed and updated as appropriate per provider:  Allergies, current medications, past family history, past medical history, past social history, past surgical history and problem list.    Review of Systems   Constitutional: Negative for activity change, appetite change, chills, fatigue and fever.   HENT: Negative for ear pain, facial swelling, hearing loss, sinus pressure, sore throat, trouble swallowing and voice change.    Eyes: Negative for pain, discharge and visual disturbance.   Respiratory: Negative for apnea, cough, chest tightness, shortness of breath and wheezing.    Cardiovascular: Negative for chest pain, palpitations and leg swelling.   Gastrointestinal: Negative for abdominal pain, blood in stool, constipation, diarrhea, nausea and vomiting.   Endocrine: Negative.    Genitourinary: Negative for dysuria, flank pain and urgency.   Musculoskeletal: Negative for arthralgias, gait problem and neck stiffness.   Skin: Negative for color change, pallor, rash and wound.   Allergic/Immunologic: Positive for environmental allergies, food allergies and immunocompromised state.   Neurological: Negative for dizziness, numbness and headaches.   Hematological: Negative.    Psychiatric/Behavioral:  "Positive for sleep disturbance. Negative for confusion, dysphoric mood and suicidal ideas. The patient is nervous/anxious.        Objective     /80   Pulse 100   Temp 98.2 °F (36.8 °C) (Temporal)   Ht 177.8 cm (70\")   Wt 93 kg (205 lb)   SpO2 98%   BMI 29.41 kg/m²   Orders Only on 10/31/2019   Component Date Value Ref Range Status   • Glucose 10/31/2019 113* 65 - 99 mg/dL Final   • BUN 10/31/2019 7  6 - 20 mg/dL Final   • Creatinine 10/31/2019 0.85  0.76 - 1.27 mg/dL Final   • Sodium 10/31/2019 140  136 - 145 mmol/L Final   • Potassium 10/31/2019 4.3  3.5 - 5.2 mmol/L Final   • Chloride 10/31/2019 103  98 - 107 mmol/L Final   • CO2 10/31/2019 27.4  22.0 - 29.0 mmol/L Final   • Calcium 10/31/2019 9.6  8.6 - 10.5 mg/dL Final   • Total Protein 10/31/2019 7.5  6.0 - 8.5 g/dL Final   • Albumin 10/31/2019 4.40  3.50 - 5.20 g/dL Final   • ALT (SGPT) 10/31/2019 17  1 - 41 U/L Final   • AST (SGOT) 10/31/2019 13  1 - 40 U/L Final   • Alkaline Phosphatase 10/31/2019 84  39 - 117 U/L Final   • Total Bilirubin 10/31/2019 0.2  0.2 - 1.2 mg/dL Final   • eGFR Non African Amer 10/31/2019 95  >60 mL/min/1.73 Final   • Globulin 10/31/2019 3.1  gm/dL Final   • A/G Ratio 10/31/2019 1.4  g/dL Final   • BUN/Creatinine Ratio 10/31/2019 8.2  7.0 - 25.0 Final   • Anion Gap 10/31/2019 9.6  5.0 - 15.0 mmol/L Final   • Total Cholesterol 10/31/2019 222* 0 - 200 mg/dL Final   • Triglycerides 10/31/2019 150  0 - 150 mg/dL Final   • HDL Cholesterol 10/31/2019 45  40 - 60 mg/dL Final   • LDL Cholesterol  10/31/2019 147* 0 - 100 mg/dL Final   • VLDL Cholesterol 10/31/2019 30  5 - 40 mg/dL Final   • LDL/HDL Ratio 10/31/2019 3.27   Final   • TSH 10/31/2019 1.660  0.270 - 4.200 uIU/mL Final   • Hemoglobin A1C 10/31/2019 5.50  4.80 - 5.60 % Final   • Uric Acid 10/31/2019 6.2  3.4 - 7.0 mg/dL Final   • Vitamin B-12 10/31/2019 393  211 - 946 pg/mL Final   • 25 Hydroxy, Vitamin D 10/31/2019 40.4  30.0 - 100.0 ng/ml Final   • " Microalbumin/Creatinine Ratio 10/31/2019 340.7  mg/g Final   • Creatinine, Urine 10/31/2019 22.6  mg/dL Final   • Microalbumin, Urine 10/31/2019 7.7  mg/dL Final   • WBC 10/31/2019 5.33  3.40 - 10.80 10*3/mm3 Final   • RBC 10/31/2019 5.33  4.14 - 5.80 10*6/mm3 Final   • Hemoglobin 10/31/2019 14.7  13.0 - 17.7 g/dL Final   • Hematocrit 10/31/2019 44.4  37.5 - 51.0 % Final   • MCV 10/31/2019 83.3  79.0 - 97.0 fL Final   • MCH 10/31/2019 27.6  26.6 - 33.0 pg Final   • MCHC 10/31/2019 33.1  31.5 - 35.7 g/dL Final   • RDW 10/31/2019 12.9  12.3 - 15.4 % Final   • RDW-SD 10/31/2019 38.8  37.0 - 54.0 fl Final   • MPV 10/31/2019 9.2  6.0 - 12.0 fL Final   • Platelets 10/31/2019 402  140 - 450 10*3/mm3 Final   • Neutrophil % 10/31/2019 61.7  42.7 - 76.0 % Final   • Lymphocyte % 10/31/2019 25.9  19.6 - 45.3 % Final   • Monocyte % 10/31/2019 6.9  5.0 - 12.0 % Final   • Eosinophil % 10/31/2019 3.8  0.3 - 6.2 % Final   • Basophil % 10/31/2019 1.3  0.0 - 1.5 % Final   • Immature Grans % 10/31/2019 0.4  0.0 - 0.5 % Final   • Neutrophils, Absolute 10/31/2019 3.29  1.70 - 7.00 10*3/mm3 Final   • Lymphocytes, Absolute 10/31/2019 1.38  0.70 - 3.10 10*3/mm3 Final   • Monocytes, Absolute 10/31/2019 0.37  0.10 - 0.90 10*3/mm3 Final   • Eosinophils, Absolute 10/31/2019 0.20  0.00 - 0.40 10*3/mm3 Final   • Basophils, Absolute 10/31/2019 0.07  0.00 - 0.20 10*3/mm3 Final   • Immature Grans, Absolute 10/31/2019 0.02  0.00 - 0.05 10*3/mm3 Final   • nRBC 10/31/2019 0.0  0.0 - 0.2 /100 WBC Final       Physical Exam   Constitutional: He is oriented to person, place, and time. Vital signs are normal. He appears well-developed and well-nourished. No distress.   HENT:   Head: Normocephalic.   Right Ear: Tympanic membrane, external ear and ear canal normal.   Left Ear: Tympanic membrane, external ear and ear canal normal.   Nose: Nose normal. No mucosal edema or sinus tenderness. Right sinus exhibits no maxillary sinus tenderness and no frontal sinus  tenderness. Left sinus exhibits no maxillary sinus tenderness and no frontal sinus tenderness.   Mouth/Throat: Uvula is midline, oropharynx is clear and moist and mucous membranes are normal. No oropharyngeal exudate or posterior oropharyngeal erythema.   Eyes: Pupils are equal, round, and reactive to light. Conjunctivae, EOM and lids are normal. Right eye exhibits no discharge. Left eye exhibits no discharge.   Neck: Normal range of motion. Neck supple. No tracheal deviation present. No thyromegaly present.   Cardiovascular: Normal rate, regular rhythm and normal heart sounds. Exam reveals no gallop and no friction rub.   No murmur heard.  Pulmonary/Chest: Effort normal and breath sounds normal. No respiratory distress. He has no wheezes. He has no rales. He exhibits no tenderness.   Abdominal: Soft. Normal appearance and bowel sounds are normal. He exhibits no distension and no mass. There is no tenderness. There is no rebound and no guarding.   Musculoskeletal: Normal range of motion.   Lymphadenopathy:     He has no cervical adenopathy.     He has no axillary adenopathy.   Neurological: He is alert and oriented to person, place, and time. He has normal reflexes.   CN 2-12 grossly intact    Skin: Skin is warm and dry. Capillary refill takes less than 2 seconds. No rash noted. He is not diaphoretic. No erythema.   Psychiatric: He has a normal mood and affect. His speech is normal and behavior is normal. Judgment and thought content normal. Cognition and memory are normal.   Vitals reviewed.      Assessment/Plan     Problem List Items Addressed This Visit        Cardiovascular and Mediastinum    Hyperlipidemia    Overview       Lab Results   Component Value Date    CHOL 222 (H) 10/31/2019    CHLPL 181 03/23/2016    TRIG 150 10/31/2019    HDL 45 10/31/2019     (H) 10/31/2019              Current Assessment & Plan     Lipid abnormalities are unchanged.  Nutritional counseling was provided. and Pharmacotherapy  as ordered.  Lipids will be reassessed in 3 months.         Relevant Medications    simvastatin (ZOCOR) 40 MG tablet    Other Relevant Orders    CBC & Differential    Comprehensive Metabolic Panel    TSH    Hemoglobin A1c    Vitamin D 25 Hydroxy    Vitamin B12    PSA Screen    Lipid Panel    MicroAlbumin, Urine, Random - Urine, Clean Catch    Hypertension    Current Assessment & Plan     Hypertension is improving with treatment.  Dietary sodium restriction.  Weight loss.  Regular aerobic exercise.  Medication changes per orders.  Ambulatory blood pressure monitoring.  Report any reading greater than 140/90 or less than 100/60.  Report any heart rate greater than 100 or less than 60.  Blood pressure will be reassessed at the next regular appointment.         Relevant Medications    atenolol (TENORMIN) 25 MG tablet    aspirin  MG tablet    lisinopril (PRINIVIL,ZESTRIL) 5 MG tablet    Other Relevant Orders    CBC & Differential    Comprehensive Metabolic Panel    TSH    Hemoglobin A1c    Vitamin D 25 Hydroxy    Vitamin B12    PSA Screen    Lipid Panel    MicroAlbumin, Urine, Random - Urine, Clean Catch       Digestive    Vitamin D deficiency disease    Current Assessment & Plan     Continue supplementation, stable         Relevant Medications    vitamin D (ERGOCALCIFEROL) 1.25 MG (38612 UT) capsule capsule    Other Relevant Orders    CBC & Differential    Comprehensive Metabolic Panel    TSH    Hemoglobin A1c    Vitamin D 25 Hydroxy    Vitamin B12    PSA Screen    Lipid Panel    MicroAlbumin, Urine, Random - Urine, Clean Catch       Other    Paranoid schizophrenia (CMS/HCC) - Primary    Current Assessment & Plan     Referral to psychiatry for medication adjustment         Relevant Orders    CBC & Differential    Comprehensive Metabolic Panel    TSH    Hemoglobin A1c    Vitamin D 25 Hydroxy    Vitamin B12    PSA Screen    Lipid Panel    MicroAlbumin, Urine, Random - Urine, Clean Catch    Overweight (BMI 25.0-29.9)     Current Assessment & Plan     Obesity is worsening.  Discussed the patient's BMI.  The BMI is above average; BMI management plan is completed.  General weight loss/lifestyle modification strategies discussed (elicit support from others; identify saboteurs; non-food rewards, etc).  Informal exercise measures discussed, e.g. taking stairs instead of elevator.  Regular aerobic exercise program discussed.         Relevant Orders    CBC & Differential    Comprehensive Metabolic Panel    TSH    Hemoglobin A1c    Vitamin D 25 Hydroxy    Vitamin B12    PSA Screen    Lipid Panel    MicroAlbumin, Urine, Random - Urine, Clean Catch    Primary insomnia    Current Assessment & Plan     Continue current medications, sleep habit routine discussed.  Referral to psychiatry to discuss medication.         Relevant Orders    CBC & Differential    Comprehensive Metabolic Panel    TSH    Hemoglobin A1c    Vitamin D 25 Hydroxy    Vitamin B12    PSA Screen    Lipid Panel    MicroAlbumin, Urine, Random - Urine, Clean Catch      Other Visit Diagnoses     Prostate cancer screening        Relevant Orders    PSA Screen    Abnormal finding of blood chemistry, unspecified         Relevant Orders    Hemoglobin A1c               Patient's Body mass index is 29.41 kg/m². BMI is above normal parameters. Recommendations include: exercise counseling and nutrition counseling.      I have discussed diagnosis in detail today allowing time for questions and answers. Patient is aware of reasons to seek urgent or emergent medical care as well as reasons to return to the clinic for evaluation. Possible side effects, interactions and progression of symptoms discussed as well. Patient / family states understanding.   Emotional support and active listening provided.   Patient denies any thoughts of hurting self or others.    Discussed current medication regimen.  Side effects and possible interactions.  Discussed symptoms to report to provider or to seek  immediate medical evaluation for.  Patient states understanding.  Encourage patient to see psychiatry through [facility] for medication adjustment and evaluation.  He is going to make an appointment for the [facility] while checking out at this office today.    Follow up in 3 months. Routine labs fasting one week prior to next office visit. Return sooner if needed.         This document has been electronically signed by:  ADOLFO Valero, NP-C

## 2020-02-27 NOTE — ASSESSMENT & PLAN NOTE
Continue current medications, sleep habit routine discussed.  Referral to psychiatry to discuss medication.

## 2020-02-27 NOTE — ASSESSMENT & PLAN NOTE
Hypertension is improving with treatment.  Dietary sodium restriction.  Weight loss.  Regular aerobic exercise.  Medication changes per orders.  Ambulatory blood pressure monitoring.  Report any reading greater than 140/90 or less than 100/60.  Report any heart rate greater than 100 or less than 60.  Blood pressure will be reassessed at the next regular appointment.

## 2020-02-27 NOTE — ASSESSMENT & PLAN NOTE
Obesity is worsening.  Discussed the patient's BMI.  The BMI is above average; BMI management plan is completed.  General weight loss/lifestyle modification strategies discussed (elicit support from others; identify saboteurs; non-food rewards, etc).  Informal exercise measures discussed, e.g. taking stairs instead of elevator.  Regular aerobic exercise program discussed.

## 2020-03-17 ENCOUNTER — OFFICE VISIT (OUTPATIENT)
Dept: PSYCHIATRY | Facility: CLINIC | Age: 50
End: 2020-03-17

## 2020-03-17 VITALS
DIASTOLIC BLOOD PRESSURE: 90 MMHG | HEART RATE: 99 BPM | SYSTOLIC BLOOD PRESSURE: 158 MMHG | WEIGHT: 211.2 LBS | BODY MASS INDEX: 30.3 KG/M2

## 2020-03-17 DIAGNOSIS — F20.0 PARANOID SCHIZOPHRENIA (HCC): Primary | ICD-10-CM

## 2020-03-17 DIAGNOSIS — F06.4 ANXIETY DISORDER DUE TO KNOWN PHYSIOLOGICAL CONDITION: ICD-10-CM

## 2020-03-17 PROCEDURE — 90792 PSYCH DIAG EVAL W/MED SRVCS: CPT | Performed by: NURSE PRACTITIONER

## 2020-03-17 RX ORDER — AMITRIPTYLINE HYDROCHLORIDE 100 MG/1
100 TABLET, FILM COATED ORAL NIGHTLY
Qty: 30 TABLET | Refills: 0
Start: 2020-03-17 | End: 2020-03-31 | Stop reason: SDUPTHER

## 2020-03-17 RX ORDER — OLANZAPINE 20 MG/1
10 TABLET ORAL NIGHTLY
Qty: 14 TABLET | Refills: 0
Start: 2020-03-17 | End: 2020-03-31 | Stop reason: SDUPTHER
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
## 2020-03-31 ENCOUNTER — OFFICE VISIT (OUTPATIENT)
Dept: PSYCHIATRY | Facility: CLINIC | Age: 50
End: 2020-03-31

## 2020-03-31 DIAGNOSIS — F20.0 PARANOID SCHIZOPHRENIA (HCC): ICD-10-CM

## 2020-03-31 DIAGNOSIS — F06.4 ANXIETY DISORDER DUE TO KNOWN PHYSIOLOGICAL CONDITION: ICD-10-CM

## 2020-03-31 PROCEDURE — 99441 PR PHYS/QHP TELEPHONE EVALUATION 5-10 MIN: CPT | Performed by: NURSE PRACTITIONER

## 2020-03-31 RX ORDER — OLANZAPINE 5 MG/1
5 TABLET ORAL NIGHTLY
Qty: 30 TABLET | Refills: 0 | Status: SHIPPED | OUTPATIENT
Start: 2020-03-31 | End: 2020-04-14 | Stop reason: SDUPTHER

## 2020-03-31 RX ORDER — AMITRIPTYLINE HYDROCHLORIDE 100 MG/1
100 TABLET, FILM COATED ORAL NIGHTLY
Qty: 30 TABLET | Refills: 0 | Status: SHIPPED | OUTPATIENT
Start: 2020-03-31 | End: 2020-04-14 | Stop reason: SDUPTHER

## 2020-04-14 ENCOUNTER — OFFICE VISIT (OUTPATIENT)
Dept: PSYCHIATRY | Facility: CLINIC | Age: 50
End: 2020-04-14

## 2020-04-14 DIAGNOSIS — F20.0 PARANOID SCHIZOPHRENIA (HCC): ICD-10-CM

## 2020-04-14 DIAGNOSIS — F06.4 ANXIETY DISORDER DUE TO KNOWN PHYSIOLOGICAL CONDITION: ICD-10-CM

## 2020-04-14 PROCEDURE — 99441 PR PHYS/QHP TELEPHONE EVALUATION 5-10 MIN: CPT | Performed by: NURSE PRACTITIONER

## 2020-04-14 RX ORDER — OLANZAPINE 5 MG/1
5 TABLET ORAL NIGHTLY
Qty: 30 TABLET | Refills: 0 | Status: SHIPPED | OUTPATIENT
Start: 2020-04-14 | End: 2020-05-12 | Stop reason: SDUPTHER

## 2020-04-14 RX ORDER — AMITRIPTYLINE HYDROCHLORIDE 100 MG/1
100 TABLET, FILM COATED ORAL NIGHTLY
Qty: 30 TABLET | Refills: 0 | Status: SHIPPED | OUTPATIENT
Start: 2020-04-14 | End: 2020-05-12 | Stop reason: SDUPTHER

## 2020-04-14 NOTE — PROGRESS NOTES
You have chosen to receive care through a telephone visit. Do you consent to use a telephone visit for your medical care today? Yes        Subjective   Alfonso Barnett is a 49 y.o. male is here today for medication management follow-up.      This provider is located at [facility] at [address] [place] [address]. The provider identified herself as well as her credentials. The Patient is at/in home by herself/himself, using her/his phone because problems with video connection. The patient's condition being diagnosed/treated is appropriate for telemedicine. The patient gave consent to be seen remotely, and when consent is given they understand that the consent allows for patient identifiable information to be sent to a third party as needed.   They may refuse to be seen remotely at any time. The electronic data is encrypted and password protected, and the patient has been advised of the potential risks to privacy not withstanding such measures.      Chief Complaint: schizophrenia and insomnia    History of Present Illness:    Patient presents today for follow up for medication management for schizophrenia. Patient states he did decrease the Zyprexa down to 5mg. Patient denies any problems with decreasing dose. Patient denies any mood swings or anger. Patient reports currently depression is at a 6 on a 0-10 scale with 10 being the worst. Patient reports symptoms of depression of depressed mood, decreased energy, and decreased motivation. Patient reports anxiety is at a 7 on a 0-10 scale with 10 being the worst. Patient states feeling restless and worrying about grand-kids. Patient states getting nervous when going out places. Patient denies any paranoia. Patient denies any panic attacks. Patient states sleeping is good and still taking the Elavil. Patient states he is able to sleep longer due to no school. Patient reports sleeping at least 5-6 hours a night and patient denies any nightmares. Patient  reports appetite is good and eating at least 2-3 meals a day. Patient states he has been exercising some. Patient denies any auditory or visual hallucinations. Patient adamantly denies any SI or HI. Patient denies any side effects to medications. Patient denies any medical changes since last visit.   The following portions of the patient's history were reviewed and updated as appropriate: allergies, current medications, past family history, past medical history, past social history, past surgical history and problem list.    Review of Systems   Constitutional: Negative.    Respiratory: Negative.    Cardiovascular: Negative.    Gastrointestinal: Negative.    Neurological: Negative.    Psychiatric/Behavioral: Positive for dysphoric mood. The patient is nervous/anxious.        Objective   Physical Exam   Constitutional: He is cooperative.   Neurological: He is alert.   Psychiatric: His speech is normal. Cognition and memory are normal.     There were no vitals taken for this visit. There is no height or weight on file to calculate BMI.  Unable to obtain due to telephone visit.       No Known Allergies    Medication List:   Current Outpatient Medications   Medication Sig Dispense Refill   • amitriptyline (ELAVIL) 100 MG tablet Take 1 tablet by mouth Every Night. 30 tablet 0   • aspirin  MG tablet Take 1 tablet by mouth Daily. 30 tablet 5   • atenolol (TENORMIN) 25 MG tablet Take 0.5 tablets by mouth 2 (Two) Times a Day. 30 tablet 5   • lisinopril (PRINIVIL,ZESTRIL) 5 MG tablet Take 1 tablet by mouth Daily. 30 tablet 5   • OLANZapine (zyPREXA) 5 MG tablet Take 1 tablet by mouth Every Night. 30 tablet 0   • simvastatin (ZOCOR) 40 MG tablet Take 1 tablet by mouth Every Night. 30 tablet 5   • vitamin D (ERGOCALCIFEROL) 1.25 MG (96454 UT) capsule capsule Take 1 capsule by mouth 1 (One) Time Per Week. 4 capsule 5     No current facility-administered medications for this visit.        Mental Status Exam:   Hygiene:    unable to evaluate  Cooperation:  Cooperative  Eye Contact:  unable to evaluate  Psychomotor Behavior:  unable to evaluate  Affect:  unable to evaluate  Hopelessness: Denies  Speech:  Normal  Thought Process:  Goal directed and Linear  Thought Content:  Normal  Suicidal:  None  Homicidal:  None  Hallucinations:  None  Delusion:  None  Memory:  Intact  Orientation:  Person, Place, Time and Situation  Reliability:  fair  Insight:  Fair  Judgement:  Fair  Impulse Control:  Fair  Physical/Medical Issues:  No               Assessment/Plan   Diagnoses and all orders for this visit:    Anxiety disorder due to known physiological condition  -     amitriptyline (ELAVIL) 100 MG tablet; Take 1 tablet by mouth Every Night.    Paranoid schizophrenia (CMS/HCC)  -     OLANZapine (zyPREXA) 5 MG tablet; Take 1 tablet by mouth Every Night.          Discussed medication options with patient.  Continue Zyprexa 5 mg daily for mood.  Continue Elavil 100 mg at bedtime. Reviewed the risks, benefits, and side effects of the medications; patient acknowledged and verbally consented.  Patient states he would like to stay at current dose another few weeks before starting new medication and weaning off Zyprexa.  Discussed with patient at next appointment will discuss starting Abilify and weaning off Zyprexa. Patient was instructed on medication side effects, benefits, and also of no treatment.  Patient was given an explanation regarding potential for increased risk of diabetes, lipids, and weight gain.  Labs will be assessed as clinically indicated.  Diet was discussed especially healthy diet choices and increasing activity and exercise.  Patient was strongly urged to continue weight maintance or weight loss efforts.  Patient reported verbalized understanding of instructions.   Patient is agreeable to call the office with any questions, concerns, or worsening of symptoms. Patient is aware to call 911 or go to the nearest ER should begin having  SI/HI.        Follow up in four weeks      Errors in dictation may reflect use of voice recognition software and not all errors in transcription may have been detected prior to signing.      This visit has been rescheduled as a phone visit to comply with patient safety concerns in accordance with CDC recommendations. Total time of discussion was 5 minutes.                This document has been electronically signed by ADOLFO Barbour   April 14, 2020 09:43

## 2020-05-12 ENCOUNTER — OFFICE VISIT (OUTPATIENT)
Dept: PSYCHIATRY | Facility: CLINIC | Age: 50
End: 2020-05-12

## 2020-05-12 VITALS
SYSTOLIC BLOOD PRESSURE: 148 MMHG | DIASTOLIC BLOOD PRESSURE: 78 MMHG | HEART RATE: 83 BPM | WEIGHT: 219 LBS | BODY MASS INDEX: 31.42 KG/M2

## 2020-05-12 DIAGNOSIS — F06.4 ANXIETY DISORDER DUE TO KNOWN PHYSIOLOGICAL CONDITION: ICD-10-CM

## 2020-05-12 DIAGNOSIS — F20.0 PARANOID SCHIZOPHRENIA (HCC): ICD-10-CM

## 2020-05-12 PROCEDURE — 99213 OFFICE O/P EST LOW 20 MIN: CPT | Performed by: NURSE PRACTITIONER

## 2020-05-12 RX ORDER — OLANZAPINE 5 MG/1
5 TABLET ORAL NIGHTLY
Qty: 30 TABLET | Refills: 0 | Status: SHIPPED | OUTPATIENT
Start: 2020-05-12 | End: 2020-06-10 | Stop reason: SDUPTHER

## 2020-05-12 RX ORDER — AMITRIPTYLINE HYDROCHLORIDE 100 MG/1
100 TABLET, FILM COATED ORAL NIGHTLY
Qty: 30 TABLET | Refills: 0 | Status: SHIPPED | OUTPATIENT
Start: 2020-05-12 | End: 2020-06-10 | Stop reason: SDUPTHER

## 2020-05-12 RX ORDER — ARIPIPRAZOLE 2 MG/1
2 TABLET ORAL DAILY
Qty: 30 TABLET | Refills: 0 | Status: SHIPPED | OUTPATIENT
Start: 2020-05-12 | End: 2020-06-10

## 2020-05-12 NOTE — PROGRESS NOTES
Subjective   Alfonso Barnett is a 49 y.o. male is here today for medication management follow-up.    Chief Complaint:  Schizophrenia and insomnia    History of Present Illness:    Patient presents today for follow up for medication management for schizophrenia and insomnia. Patient states he did cut down to 5mg of zyprexa and it has helped with the side effects. Patient states the side effects that he was experiencing has calmed down now but not sleeping good. Patient denies any mood swings. Patient reports currently depression is at a 8 on a 0-10 scale with 10 being the worst. Patient reports symptoms of depression of feeling overwhelmed, depressed mood, decreased energy, and insomnia. Patient reports anxiety is at a 8 on a 0-10 scale with 10 being the worst. Patient states paranoia gets worse when stressed out.  Patient denies any panic attacks. Patient states waking up throughout the night to use bathroom but able to fall asleep easily. Patient states going to sleep around 10:30-11pm and up at 7:30am. Patient reports sleeping at least 5-6 hours a night and patient denies any nightmares. Patient states when wake up in the morning still feeling tired. Patient states Elavil does help him fall asleep. Patient reports appetite is good and eating at least 2-3 meals a day. Patient states he did have an episode recently with the increased paranoia. Patient states with the paranoia he worries about people talking about him or watching him. Patient denies any auditory or visual hallucinations. Patient adamantly denies any SI or HI. Patient denies any side effects to medications. Patient denies any medical changes since last visit.   The following portions of the patient's history were reviewed and updated as appropriate: allergies, current medications, past family history, past medical history, past social history, past surgical history and problem list.    Review of Systems   Constitutional: Negative.    Respiratory:  Negative.    Cardiovascular: Negative.    Gastrointestinal: Negative.    Neurological: Negative.    Psychiatric/Behavioral: Positive for dysphoric mood. The patient is nervous/anxious.        Objective   Physical Exam   Constitutional: Vital signs are normal. He appears well-developed and well-nourished. He is cooperative.   Neurological: He is alert.   Psychiatric: Judgment and thought content normal. His affect is blunt. His speech is delayed. He is slowed. Cognition and memory are normal.   Vitals reviewed.    Blood pressure 148/78, pulse 83, weight 99.3 kg (219 lb). Body mass index is 31.42 kg/m².    No Known Allergies    Medication List:   Current Outpatient Medications   Medication Sig Dispense Refill   • amitriptyline (ELAVIL) 100 MG tablet Take 1 tablet by mouth Every Night. 30 tablet 0   • aspirin  MG tablet Take 1 tablet by mouth Daily. 30 tablet 5   • atenolol (TENORMIN) 25 MG tablet Take 0.5 tablets by mouth 2 (Two) Times a Day. 30 tablet 5   • lisinopril (PRINIVIL,ZESTRIL) 5 MG tablet Take 1 tablet by mouth Daily. 30 tablet 5   • OLANZapine (zyPREXA) 5 MG tablet Take 1 tablet by mouth Every Night. 30 tablet 0   • simvastatin (ZOCOR) 40 MG tablet Take 1 tablet by mouth Every Night. 30 tablet 5   • vitamin D (ERGOCALCIFEROL) 1.25 MG (53135 UT) capsule capsule Take 1 capsule by mouth 1 (One) Time Per Week. 4 capsule 5     No current facility-administered medications for this visit.        Mental Status Exam:   Hygiene:   good  Cooperation:  Cooperative  Eye Contact:  Fair  Psychomotor Behavior:  Appropriate  Affect:  Appropriate  Hopelessness: Denies  Speech:  Normal  Thought Process:  Goal directed and Linear  Thought Content:  Normal  Suicidal:  None  Homicidal:  None  Hallucinations:  None  Delusion:  None  Memory:  Intact  Orientation:  Person, Place, Time and Situation  Reliability:  fair  Insight:  Fair  Judgement:  Fair  Impulse Control:  Fair  Physical/Medical Issues:  Yes HTN                     Assessment/Plan   Diagnoses and all orders for this visit:    Paranoid schizophrenia (CMS/HCC)  -     OLANZapine (zyPREXA) 5 MG tablet; Take 1 tablet by mouth Every Night.  -     ARIPiprazole (Abilify) 2 MG tablet; Take 1 tablet by mouth Daily.    Anxiety disorder due to known physiological condition  -     amitriptyline (ELAVIL) 100 MG tablet; Take 1 tablet by mouth Every Night.            Discussed medication options with patient. Start Abilify at 2mg daily for schizophrenia. Cont. Zyprexa 5mg daily with plan to wean off as tolerated. Reviewed the risks, benefits, and side effects of the medications; patient acknowledged and verbally consented. Patient was instructed on medication side effects, benefits, and also of no treatment.  Patient was given an explanation regarding potential for increased risk of diabetes, lipids, and weight gain.  Labs will be assessed as clinically indicated.  Diet was discussed especially healthy diet choices and increasing activity and exercise.  Patient was strongly urged to continue weight maintance or weight loss efforts.  Patient reported verbalized understanding of instructions. Discussed with patient if he begins having any involuntary movements of face, hands, or tongue to notify the office.    Patient is agreeable to call the office with any questions, concerns, or worsening symptoms.  Patient is aware to call 911 or go to the nearest ER should begin having SI/HI.          Follow up in four weeks       Errors in dictation may reflect use of voice recognition software and not all errors in transcription may have been detected prior to signing.              This document has been electronically signed by ADOLFO Barbour   May 12, 2020 10:22

## 2020-06-01 ENCOUNTER — OFFICE VISIT (OUTPATIENT)
Dept: FAMILY MEDICINE CLINIC | Facility: CLINIC | Age: 50
End: 2020-06-01

## 2020-06-01 VITALS
HEIGHT: 70 IN | SYSTOLIC BLOOD PRESSURE: 150 MMHG | BODY MASS INDEX: 30.21 KG/M2 | OXYGEN SATURATION: 97 % | WEIGHT: 211 LBS | HEART RATE: 88 BPM | DIASTOLIC BLOOD PRESSURE: 90 MMHG | TEMPERATURE: 97.2 F

## 2020-06-01 DIAGNOSIS — F51.01 PRIMARY INSOMNIA: ICD-10-CM

## 2020-06-01 DIAGNOSIS — E66.09 CLASS 1 OBESITY DUE TO EXCESS CALORIES WITH SERIOUS COMORBIDITY AND BODY MASS INDEX (BMI) OF 30.0 TO 30.9 IN ADULT: ICD-10-CM

## 2020-06-01 DIAGNOSIS — F20.0 PARANOID SCHIZOPHRENIA (HCC): ICD-10-CM

## 2020-06-01 DIAGNOSIS — E78.2 MIXED HYPERLIPIDEMIA: ICD-10-CM

## 2020-06-01 DIAGNOSIS — E53.8 VITAMIN B 12 DEFICIENCY: ICD-10-CM

## 2020-06-01 DIAGNOSIS — E55.9 VITAMIN D DEFICIENCY DISEASE: ICD-10-CM

## 2020-06-01 DIAGNOSIS — Z12.5 PROSTATE CANCER SCREENING: ICD-10-CM

## 2020-06-01 DIAGNOSIS — I10 ESSENTIAL HYPERTENSION: ICD-10-CM

## 2020-06-01 DIAGNOSIS — E66.3 OVERWEIGHT (BMI 25.0-29.9): ICD-10-CM

## 2020-06-01 DIAGNOSIS — F06.4 ANXIETY DISORDER DUE TO KNOWN PHYSIOLOGICAL CONDITION: ICD-10-CM

## 2020-06-01 DIAGNOSIS — R79.9 ABNORMAL FINDING OF BLOOD CHEMISTRY, UNSPECIFIED: ICD-10-CM

## 2020-06-01 DIAGNOSIS — H61.23 BILATERAL IMPACTED CERUMEN: Primary | ICD-10-CM

## 2020-06-01 PROBLEM — E66.811 CLASS 1 OBESITY IN ADULT: Status: ACTIVE | Noted: 2019-11-05

## 2020-06-01 PROBLEM — E66.9 CLASS 1 OBESITY IN ADULT: Status: ACTIVE | Noted: 2019-11-05

## 2020-06-01 PROCEDURE — 82043 UR ALBUMIN QUANTITATIVE: CPT | Performed by: NURSE PRACTITIONER

## 2020-06-01 PROCEDURE — 96372 THER/PROPH/DIAG INJ SC/IM: CPT | Performed by: NURSE PRACTITIONER

## 2020-06-01 PROCEDURE — 85025 COMPLETE CBC W/AUTO DIFF WBC: CPT | Performed by: NURSE PRACTITIONER

## 2020-06-01 PROCEDURE — 82607 VITAMIN B-12: CPT | Performed by: NURSE PRACTITIONER

## 2020-06-01 PROCEDURE — 36415 COLL VENOUS BLD VENIPUNCTURE: CPT | Performed by: NURSE PRACTITIONER

## 2020-06-01 PROCEDURE — 99214 OFFICE O/P EST MOD 30 MIN: CPT | Performed by: NURSE PRACTITIONER

## 2020-06-01 PROCEDURE — 69210 REMOVE IMPACTED EAR WAX UNI: CPT | Performed by: NURSE PRACTITIONER

## 2020-06-01 PROCEDURE — 82306 VITAMIN D 25 HYDROXY: CPT | Performed by: NURSE PRACTITIONER

## 2020-06-01 PROCEDURE — 80061 LIPID PANEL: CPT | Performed by: NURSE PRACTITIONER

## 2020-06-01 PROCEDURE — 80053 COMPREHEN METABOLIC PANEL: CPT | Performed by: NURSE PRACTITIONER

## 2020-06-01 PROCEDURE — 83036 HEMOGLOBIN GLYCOSYLATED A1C: CPT | Performed by: NURSE PRACTITIONER

## 2020-06-01 PROCEDURE — G0103 PSA SCREENING: HCPCS | Performed by: NURSE PRACTITIONER

## 2020-06-01 PROCEDURE — 84443 ASSAY THYROID STIM HORMONE: CPT | Performed by: NURSE PRACTITIONER

## 2020-06-01 RX ORDER — ERGOCALCIFEROL 1.25 MG/1
50000 CAPSULE ORAL WEEKLY
Qty: 4 CAPSULE | Refills: 5 | Status: SHIPPED | OUTPATIENT
Start: 2020-06-01 | End: 2020-07-09 | Stop reason: SDUPTHER

## 2020-06-01 RX ORDER — SIMVASTATIN 40 MG
40 TABLET ORAL NIGHTLY
Qty: 30 TABLET | Refills: 5 | Status: SHIPPED | OUTPATIENT
Start: 2020-06-01 | End: 2020-07-09 | Stop reason: SDUPTHER

## 2020-06-01 RX ORDER — CYANOCOBALAMIN 1000 UG/ML
1000 INJECTION, SOLUTION INTRAMUSCULAR; SUBCUTANEOUS
Status: DISCONTINUED | OUTPATIENT
Start: 2020-06-01 | End: 2021-12-07

## 2020-06-01 RX ORDER — LISINOPRIL 10 MG/1
10 TABLET ORAL DAILY
Qty: 30 TABLET | Refills: 5 | Status: SHIPPED | OUTPATIENT
Start: 2020-06-01 | End: 2020-07-09 | Stop reason: SDUPTHER

## 2020-06-01 RX ORDER — ASPIRIN 325 MG
325 TABLET, DELAYED RELEASE (ENTERIC COATED) ORAL DAILY
Qty: 30 TABLET | Refills: 5 | Status: SHIPPED | OUTPATIENT
Start: 2020-06-01 | End: 2020-07-09 | Stop reason: SDUPTHER

## 2020-06-01 RX ORDER — ATENOLOL 25 MG/1
12.5 TABLET ORAL 2 TIMES DAILY
Qty: 30 TABLET | Refills: 5 | Status: SHIPPED | OUTPATIENT
Start: 2020-06-01 | End: 2020-07-09 | Stop reason: SDUPTHER

## 2020-06-01 RX ADMIN — CYANOCOBALAMIN 1000 MCG: 1000 INJECTION, SOLUTION INTRAMUSCULAR; SUBCUTANEOUS at 11:27

## 2020-06-01 NOTE — PROGRESS NOTES
Subjective   Alfonso Barnett is a 50 y.o. male.     Chief complaint  Hypertension  Vitamin D deficiency       History of Present Illness:    Deficiency- patient is taking a vitamin D supplement weekly.  No recent labs.    Hyperlipidemia-patient is taking Zocor 40 mg daily.  He tries to watch his diet.  Tries to stay active doing yard work.  Denies any side effects from statin.    Hypertension-patient currently receives atenolol and lisinopril.  He is blood pressure is elevated today at 150/90.    Anxiety disorder with paranoid schizophrenia-under the care of behavioral health.  Patient reports that he really likes his mental health provider and feels that he is doing well.      The following portions of the patient's history and ROS were reviewed and updated as appropriate per provider:  Allergies, current medications, past family history, past medical history, past social history, past surgical history and problem list.    Review of Systems   Constitutional: Negative for activity change, appetite change, fatigue and unexpected weight change.   HENT: Negative for congestion, sinus pressure, sinus pain, sore throat and trouble swallowing.    Eyes: Negative.    Respiratory: Negative for cough, chest tightness, shortness of breath and wheezing.    Cardiovascular: Negative.    Gastrointestinal: Negative for abdominal pain, constipation, diarrhea and nausea.   Endocrine: Negative.    Genitourinary: Negative for difficulty urinating, flank pain and frequency.   Musculoskeletal: Positive for arthralgias. Negative for joint swelling, neck pain and neck stiffness.   Skin: Negative.    Allergic/Immunologic: Negative for environmental allergies, food allergies and immunocompromised state.   Neurological: Negative for dizziness, seizures, speech difficulty, light-headedness and headaches.   Hematological: Negative.    Psychiatric/Behavioral: Negative for dysphoric mood, self-injury and suicidal ideas.       Objective     /90   " Pulse 88   Temp 97.2 °F (36.2 °C) (Temporal)   Ht 177.8 cm (70\")   Wt 95.7 kg (211 lb)   SpO2 97%   BMI 30.28 kg/m²   Orders Only on 10/31/2019   Component Date Value Ref Range Status   • Glucose 10/31/2019 113* 65 - 99 mg/dL Final   • BUN 10/31/2019 7  6 - 20 mg/dL Final   • Creatinine 10/31/2019 0.85  0.76 - 1.27 mg/dL Final   • Sodium 10/31/2019 140  136 - 145 mmol/L Final   • Potassium 10/31/2019 4.3  3.5 - 5.2 mmol/L Final   • Chloride 10/31/2019 103  98 - 107 mmol/L Final   • CO2 10/31/2019 27.4  22.0 - 29.0 mmol/L Final   • Calcium 10/31/2019 9.6  8.6 - 10.5 mg/dL Final   • Total Protein 10/31/2019 7.5  6.0 - 8.5 g/dL Final   • Albumin 10/31/2019 4.40  3.50 - 5.20 g/dL Final   • ALT (SGPT) 10/31/2019 17  1 - 41 U/L Final   • AST (SGOT) 10/31/2019 13  1 - 40 U/L Final   • Alkaline Phosphatase 10/31/2019 84  39 - 117 U/L Final   • Total Bilirubin 10/31/2019 0.2  0.2 - 1.2 mg/dL Final   • eGFR Non African Amer 10/31/2019 95  >60 mL/min/1.73 Final   • Globulin 10/31/2019 3.1  gm/dL Final   • A/G Ratio 10/31/2019 1.4  g/dL Final   • BUN/Creatinine Ratio 10/31/2019 8.2  7.0 - 25.0 Final   • Anion Gap 10/31/2019 9.6  5.0 - 15.0 mmol/L Final   • Total Cholesterol 10/31/2019 222* 0 - 200 mg/dL Final   • Triglycerides 10/31/2019 150  0 - 150 mg/dL Final   • HDL Cholesterol 10/31/2019 45  40 - 60 mg/dL Final   • LDL Cholesterol  10/31/2019 147* 0 - 100 mg/dL Final   • VLDL Cholesterol 10/31/2019 30  5 - 40 mg/dL Final   • LDL/HDL Ratio 10/31/2019 3.27   Final   • TSH 10/31/2019 1.660  0.270 - 4.200 uIU/mL Final   • Hemoglobin A1C 10/31/2019 5.50  4.80 - 5.60 % Final   • Uric Acid 10/31/2019 6.2  3.4 - 7.0 mg/dL Final   • Vitamin B-12 10/31/2019 393  211 - 946 pg/mL Final   • 25 Hydroxy, Vitamin D 10/31/2019 40.4  30.0 - 100.0 ng/ml Final   • Microalbumin/Creatinine Ratio 10/31/2019 340.7  mg/g Final   • Creatinine, Urine 10/31/2019 22.6  mg/dL Final   • Microalbumin, Urine 10/31/2019 7.7  mg/dL Final   • WBC " 10/31/2019 5.33  3.40 - 10.80 10*3/mm3 Final   • RBC 10/31/2019 5.33  4.14 - 5.80 10*6/mm3 Final   • Hemoglobin 10/31/2019 14.7  13.0 - 17.7 g/dL Final   • Hematocrit 10/31/2019 44.4  37.5 - 51.0 % Final   • MCV 10/31/2019 83.3  79.0 - 97.0 fL Final   • MCH 10/31/2019 27.6  26.6 - 33.0 pg Final   • MCHC 10/31/2019 33.1  31.5 - 35.7 g/dL Final   • RDW 10/31/2019 12.9  12.3 - 15.4 % Final   • RDW-SD 10/31/2019 38.8  37.0 - 54.0 fl Final   • MPV 10/31/2019 9.2  6.0 - 12.0 fL Final   • Platelets 10/31/2019 402  140 - 450 10*3/mm3 Final   • Neutrophil % 10/31/2019 61.7  42.7 - 76.0 % Final   • Lymphocyte % 10/31/2019 25.9  19.6 - 45.3 % Final   • Monocyte % 10/31/2019 6.9  5.0 - 12.0 % Final   • Eosinophil % 10/31/2019 3.8  0.3 - 6.2 % Final   • Basophil % 10/31/2019 1.3  0.0 - 1.5 % Final   • Immature Grans % 10/31/2019 0.4  0.0 - 0.5 % Final   • Neutrophils, Absolute 10/31/2019 3.29  1.70 - 7.00 10*3/mm3 Final   • Lymphocytes, Absolute 10/31/2019 1.38  0.70 - 3.10 10*3/mm3 Final   • Monocytes, Absolute 10/31/2019 0.37  0.10 - 0.90 10*3/mm3 Final   • Eosinophils, Absolute 10/31/2019 0.20  0.00 - 0.40 10*3/mm3 Final   • Basophils, Absolute 10/31/2019 0.07  0.00 - 0.20 10*3/mm3 Final   • Immature Grans, Absolute 10/31/2019 0.02  0.00 - 0.05 10*3/mm3 Final   • nRBC 10/31/2019 0.0  0.0 - 0.2 /100 WBC Final       Physical Exam   Constitutional: He is oriented to person, place, and time. Vital signs are normal. He appears well-developed and well-nourished. He is cooperative.  Non-toxic appearance. He does not have a sickly appearance. He does not appear ill. No distress.   HENT:   Head: Normocephalic.   Right Ear: External ear normal. There is drainage.   Left Ear: External ear normal. There is drainage.   Nose: Nose normal. Right sinus exhibits no maxillary sinus tenderness and no frontal sinus tenderness. Left sinus exhibits no maxillary sinus tenderness and no frontal sinus tenderness.   Mouth/Throat: Uvula is midline,  oropharynx is clear and moist and mucous membranes are normal. No oropharyngeal exudate.   Cerumen impaction bilateral ear canals.  Removed with flex loop instrument.  Large amount of black cerumen removed.   Eyes: Pupils are equal, round, and reactive to light. Conjunctivae, EOM and lids are normal. Right eye exhibits no discharge. Left eye exhibits no discharge.   Neck: Normal range of motion. Neck supple. No tracheal deviation present. No thyromegaly present.   Cardiovascular: Normal rate, regular rhythm and normal heart sounds. Exam reveals no gallop and no friction rub.   No murmur heard.  Pulmonary/Chest: Effort normal and breath sounds normal. No respiratory distress. He has no wheezes. He has no rales. He exhibits no tenderness.   Abdominal: Soft. Normal appearance and bowel sounds are normal. He exhibits no distension and no mass. There is no tenderness. There is no rebound and no guarding.   Musculoskeletal: Normal range of motion.   Lymphadenopathy:     He has no cervical adenopathy.   Neurological: He is alert and oriented to person, place, and time. He has normal reflexes. He is not disoriented. No cranial nerve deficit.   CN 2-12 grossly intact    Skin: Skin is warm, dry and intact. Capillary refill takes less than 2 seconds. No rash noted. He is not diaphoretic. No erythema.   Multiple tattoos   Psychiatric: He has a normal mood and affect. His speech is normal and behavior is normal. Judgment and thought content normal. Cognition and memory are normal.   Vitals reviewed.      Assessment/Plan     Problem List Items Addressed This Visit        Cardiovascular and Mediastinum    Hyperlipidemia    Overview       Lab Results   Component Value Date    CHOL 222 (H) 10/31/2019    CHLPL 181 03/23/2016    TRIG 150 10/31/2019    HDL 45 10/31/2019     (H) 10/31/2019              Relevant Medications    simvastatin (ZOCOR) 40 MG tablet    Hypertension    Relevant Medications    lisinopril (PRINIVIL,ZESTRIL)  10 MG tablet    atenolol (TENORMIN) 25 MG tablet    aspirin  MG tablet       Digestive    Vitamin D deficiency disease    Relevant Medications    vitamin D (ERGOCALCIFEROL) 1.25 MG (22273 UT) capsule capsule    Class 1 obesity in adult    Current Assessment & Plan     Obesity is worsening.  Discussed the patient's BMI.  The BMI is above average; BMI management plan is completed.  General weight loss/lifestyle modification strategies discussed (elicit support from others; identify saboteurs; non-food rewards, etc).  Informal exercise measures discussed, e.g. taking stairs instead of elevator.  Regular aerobic exercise program discussed.         Vitamin B 12 deficiency    Current Assessment & Plan     Monthly injection,   level today prior to injection          Relevant Medications    cyanocobalamin injection 1,000 mcg (Start on 6/1/2020 11:26 AM)       Nervous and Auditory    Bilateral impacted cerumen - Primary    Overview     Tolerated flex loop removal            Other    Anxiety disorder    Current Assessment & Plan     Improving with treatment.  Remain under the care of psychiatry.                 Seeing labs today.  Medication list reviewed and discussed.  Stop lisinopril 5 mg daily.  Start lisinopril 10 mg daily.  Monitor blood pressure and report any elevated reading greater than 140/90 or less than 100/60.         Patient's Body mass index is 30.28 kg/m². BMI is above normal parameters. Recommendations include: exercise counseling and nutrition counseling.        I have discussed diagnosis in detail today allowing time for questions and answers. Patient is aware of reasons to seek urgent or emergent medical care as well as reasons to return to the clinic for evaluation. Possible side effects, interactions and progression of symptoms discussed as well. Patient / family states understanding.   Emotional support and active listening provided.     Medication  adjustments made. Fasting labs today.  I would  like to see him back in his Medicare wellness exam.  We will review labs at that time and evaluate the effectiveness of blood pressure medication changes.    Follow-up sooner if needed.    Coronavirus precautions have been reviewed and discussed.  I have discussed the CDC recommendations  of social distancing, hand washing and disinfecting commonly touched items. Reviewed need to notify PCP and self quarantine with mild symptoms.  Discussed procedure to obtain Covid-19 testing and notification of PCP/health dept/ED/Urgent Center if symptoms begin. Understanding verbalized.        This document has been electronically signed by:  ADOLFO Valero, NP-C

## 2020-06-02 LAB
25(OH)D3 SERPL-MCNC: 38 NG/ML (ref 30–100)
ALBUMIN SERPL-MCNC: 5 G/DL (ref 3.5–5.2)
ALBUMIN UR-MCNC: <1.2 MG/DL
ALBUMIN/GLOB SERPL: 2.1 G/DL
ALP SERPL-CCNC: 71 U/L (ref 39–117)
ALT SERPL W P-5'-P-CCNC: 16 U/L (ref 1–41)
ANION GAP SERPL CALCULATED.3IONS-SCNC: 11 MMOL/L (ref 5–15)
AST SERPL-CCNC: 18 U/L (ref 1–40)
BASOPHILS # BLD AUTO: 0.05 10*3/MM3 (ref 0–0.2)
BASOPHILS NFR BLD AUTO: 1 % (ref 0–1.5)
BILIRUB SERPL-MCNC: 0.2 MG/DL (ref 0.2–1.2)
BUN BLD-MCNC: 7 MG/DL (ref 6–20)
BUN/CREAT SERPL: 8.3 (ref 7–25)
CALCIUM SPEC-SCNC: 9.8 MG/DL (ref 8.6–10.5)
CHLORIDE SERPL-SCNC: 97 MMOL/L (ref 98–107)
CHOLEST SERPL-MCNC: 166 MG/DL (ref 0–200)
CO2 SERPL-SCNC: 25 MMOL/L (ref 22–29)
CREAT BLD-MCNC: 0.84 MG/DL (ref 0.76–1.27)
DEPRECATED RDW RBC AUTO: 39 FL (ref 37–54)
EOSINOPHIL # BLD AUTO: 0.09 10*3/MM3 (ref 0–0.4)
EOSINOPHIL NFR BLD AUTO: 1.8 % (ref 0.3–6.2)
ERYTHROCYTE [DISTWIDTH] IN BLOOD BY AUTOMATED COUNT: 12.6 % (ref 12.3–15.4)
GFR SERPL CREATININE-BSD FRML MDRD: 96 ML/MIN/1.73
GLOBULIN UR ELPH-MCNC: 2.4 GM/DL
GLUCOSE BLD-MCNC: 105 MG/DL (ref 65–99)
HBA1C MFR BLD: 5.5 % (ref 4.8–5.6)
HCT VFR BLD AUTO: 45 % (ref 37.5–51)
HDLC SERPL-MCNC: 61 MG/DL (ref 40–60)
HGB BLD-MCNC: 15.1 G/DL (ref 13–17.7)
IMM GRANULOCYTES # BLD AUTO: 0.01 10*3/MM3 (ref 0–0.05)
IMM GRANULOCYTES NFR BLD AUTO: 0.2 % (ref 0–0.5)
LDLC SERPL CALC-MCNC: 85 MG/DL (ref 0–100)
LDLC/HDLC SERPL: 1.4 {RATIO}
LYMPHOCYTES # BLD AUTO: 1.14 10*3/MM3 (ref 0.7–3.1)
LYMPHOCYTES NFR BLD AUTO: 23.1 % (ref 19.6–45.3)
MCH RBC QN AUTO: 28.5 PG (ref 26.6–33)
MCHC RBC AUTO-ENTMCNC: 33.6 G/DL (ref 31.5–35.7)
MCV RBC AUTO: 84.9 FL (ref 79–97)
MONOCYTES # BLD AUTO: 0.34 10*3/MM3 (ref 0.1–0.9)
MONOCYTES NFR BLD AUTO: 6.9 % (ref 5–12)
NEUTROPHILS # BLD AUTO: 3.3 10*3/MM3 (ref 1.7–7)
NEUTROPHILS NFR BLD AUTO: 67 % (ref 42.7–76)
NRBC BLD AUTO-RTO: 0 /100 WBC (ref 0–0.2)
PLATELET # BLD AUTO: 309 10*3/MM3 (ref 140–450)
PMV BLD AUTO: 8.5 FL (ref 6–12)
POTASSIUM BLD-SCNC: 4.5 MMOL/L (ref 3.5–5.2)
PROT SERPL-MCNC: 7.4 G/DL (ref 6–8.5)
PSA SERPL-MCNC: 0.84 NG/ML (ref 0–4)
RBC # BLD AUTO: 5.3 10*6/MM3 (ref 4.14–5.8)
SODIUM BLD-SCNC: 133 MMOL/L (ref 136–145)
TRIGL SERPL-MCNC: 99 MG/DL (ref 0–150)
TSH SERPL DL<=0.05 MIU/L-ACNC: 1.19 UIU/ML (ref 0.27–4.2)
VIT B12 BLD-MCNC: 400 PG/ML (ref 211–946)
VLDLC SERPL-MCNC: 19.8 MG/DL (ref 5–40)
WBC NRBC COR # BLD: 4.93 10*3/MM3 (ref 3.4–10.8)

## 2020-06-10 ENCOUNTER — OFFICE VISIT (OUTPATIENT)
Dept: PSYCHIATRY | Facility: CLINIC | Age: 50
End: 2020-06-10

## 2020-06-10 VITALS
SYSTOLIC BLOOD PRESSURE: 132 MMHG | TEMPERATURE: 98.7 F | BODY MASS INDEX: 30.78 KG/M2 | HEIGHT: 70 IN | DIASTOLIC BLOOD PRESSURE: 78 MMHG | HEART RATE: 93 BPM | WEIGHT: 215 LBS | OXYGEN SATURATION: 98 %

## 2020-06-10 DIAGNOSIS — F20.0 PARANOID SCHIZOPHRENIA (HCC): ICD-10-CM

## 2020-06-10 DIAGNOSIS — F06.4 ANXIETY DISORDER DUE TO KNOWN PHYSIOLOGICAL CONDITION: ICD-10-CM

## 2020-06-10 PROCEDURE — 99213 OFFICE O/P EST LOW 20 MIN: CPT | Performed by: NURSE PRACTITIONER

## 2020-06-10 RX ORDER — OLANZAPINE 20 MG/1
20 TABLET ORAL NIGHTLY
Qty: 30 TABLET | Refills: 0 | Status: SHIPPED | OUTPATIENT
Start: 2020-06-10 | End: 2020-07-09 | Stop reason: SDUPTHER

## 2020-06-10 RX ORDER — AMITRIPTYLINE HYDROCHLORIDE 100 MG/1
100 TABLET, FILM COATED ORAL NIGHTLY
Qty: 30 TABLET | Refills: 0 | Status: SHIPPED | OUTPATIENT
Start: 2020-06-10 | End: 2020-07-09 | Stop reason: SDUPTHER

## 2020-06-10 NOTE — PROGRESS NOTES
Subjective   Alfonso Barnett is a 50 y.o. male is here today for medication management follow-up.    Chief Complaint: schizophrenia and insomnia    History of Present Illness:   Patient presents today for follow up for medication management for schizophrenia and insomnia. Patient states a lot of stress and was not sleeping again. Patient states paranoia was coming back and was imagining talking to people. Patient states he would think that he was talking to someone and then find out it didn't happen. Patient states paranoia increased with going outside and worrying about people watching him or saying things about him. Patient states he could tell he was getting worse so he had some Zyprexa left at home. Patient states he started back on the 20mg of the Zyprexa about two weeks ago. Patient states he feels like right now he will deal with the side effects due to the medicine helping so much. Patient states goes to court in August for grandkids. Patient reports currently depression is at a 7-8 on a 0-10 scale with 10 being the worst. Patient reports symptoms of depression of dwelling on things, depressed mood, insomnia, and decreased energy. Patient reports anxiety is at a 5-6 on a 0-10 scale with 10 being the worst. Patient states worrying a lot about grandkids. Patient denies any panic attacks. Patient reports sleeping good now that back on medication and patient denies any nightmares. Patient states feeling rested when waking up. Patient reports appetite is good and eating at least 2-3 meals a day. Patient denies auditory or visual hallucinations. Patient adamantly denies any SI or HI. Patient denies any side effects to medications. Patient denies any medical changes since last visit. Patient states slight tremor of hands but denies any facial movements.   The following portions of the patient's history were reviewed and updated as appropriate: allergies, current medications, past family history, past medical  "history, past social history, past surgical history and problem list.    Review of Systems   Constitutional: Negative.    Respiratory: Negative.    Cardiovascular: Negative.    Gastrointestinal: Negative.    Neurological: Negative.    Psychiatric/Behavioral: Positive for dysphoric mood. The patient is nervous/anxious.        Objective   Physical Exam   Constitutional: Vital signs are normal. He appears well-developed and well-nourished. He is cooperative.   Neurological: He is alert.   Psychiatric: His speech is normal and behavior is normal. Judgment normal. His affect is blunt. Thought content is paranoid. Cognition and memory are normal.   Vitals reviewed.    Blood pressure 132/78, pulse 93, temperature 98.7 °F (37.1 °C), temperature source Temporal, height 177.8 cm (70\"), weight 97.5 kg (215 lb), SpO2 98 %. Body mass index is 30.85 kg/m².    No Known Allergies    Medication List:   Current Outpatient Medications   Medication Sig Dispense Refill   • amitriptyline (ELAVIL) 100 MG tablet Take 1 tablet by mouth Every Night. 30 tablet 0   • aspirin  MG tablet Take 1 tablet by mouth Daily. 30 tablet 5   • atenolol (TENORMIN) 25 MG tablet Take 0.5 tablets by mouth 2 (Two) Times a Day. 30 tablet 5   • lisinopril (PRINIVIL,ZESTRIL) 10 MG tablet Take 1 tablet by mouth Daily. 30 tablet 5   • OLANZapine (zyPREXA) 20 MG tablet Take 1 tablet by mouth Every Night. 30 tablet 0   • simvastatin (ZOCOR) 40 MG tablet Take 1 tablet by mouth Every Night. 30 tablet 5   • vitamin D (ERGOCALCIFEROL) 1.25 MG (17583 UT) capsule capsule Take 1 capsule by mouth 1 (One) Time Per Week. 4 capsule 5     Current Facility-Administered Medications   Medication Dose Route Frequency Provider Last Rate Last Dose   • cyanocobalamin injection 1,000 mcg  1,000 mcg Intramuscular Q28 Days Carolina Lua APRN   1,000 mcg at 06/01/20 1127       Mental Status Exam:   Hygiene:   good  Cooperation:  Cooperative  Eye Contact:  " Good  Psychomotor Behavior:  Appropriate  Affect:  Blunted  Hopelessness: Denies  Speech:  Monotone  Thought Process:  Goal directed and Linear  Thought Content:  Normal  Suicidal:  None  Homicidal:  None  Hallucinations:  None  Delusion:  None  Memory:  Intact  Orientation:  Person, Place, Time and Situation  Reliability:  good  Insight:  Good  Judgement:  Good  Impulse Control:  Good  Physical/Medical Issues:  No               Assessment/Plan   Diagnoses and all orders for this visit:    Paranoid schizophrenia (CMS/HCC)  -     OLANZapine (zyPREXA) 20 MG tablet; Take 1 tablet by mouth Every Night.    Anxiety disorder due to known physiological condition  -     amitriptyline (ELAVIL) 100 MG tablet; Take 1 tablet by mouth Every Night.            Discussed medication options with patient.  Increase Zyprexa back to 20 mg daily for worsening mood, anxiety, and paranoia.  Continue Elavil 100 mg at bedtime for anxiety and insomnia.  Reviewed the risks, benefits, and side effects of the medications; patient acknowledged and verbally consented. Patient was instructed on medication side effects, benefits, and also of no treatment.  Patient was given an explanation regarding potential for increased risk of diabetes, lipids, and weight gain.  Labs will be assessed as clinically indicated.  Diet was discussed especially healthy diet choices and increasing activity and exercise.  Patient was strongly urged to continue weight maintance or weight loss efforts.  Patient reported verbalized understanding of instructions.   Patient is agreeable to call the office with any questions, concerns, or worsening of symptoms.  Patient is aware to call 911 or go to the nearest ER should begin having SI/HI.            Follow up in four weeks        Errors in dictation may reflect use of voice recognition software and not all errors in transcription may have been detected prior to signing.              This document has been electronically  signed by ADOLFO Barbour   [place] 10, 2020 12:19

## 2020-06-15 ENCOUNTER — OFFICE VISIT (OUTPATIENT)
Dept: FAMILY MEDICINE CLINIC | Facility: CLINIC | Age: 50
End: 2020-06-15

## 2020-06-15 VITALS
TEMPERATURE: 97.1 F | BODY MASS INDEX: 31.47 KG/M2 | DIASTOLIC BLOOD PRESSURE: 82 MMHG | HEART RATE: 93 BPM | OXYGEN SATURATION: 97 % | SYSTOLIC BLOOD PRESSURE: 132 MMHG | HEIGHT: 70 IN | WEIGHT: 219.8 LBS

## 2020-06-15 DIAGNOSIS — E11.9 TYPE 2 DIABETES MELLITUS WITHOUT COMPLICATION, WITHOUT LONG-TERM CURRENT USE OF INSULIN (HCC): Primary | ICD-10-CM

## 2020-06-15 DIAGNOSIS — F20.0 PARANOID SCHIZOPHRENIA (HCC): ICD-10-CM

## 2020-06-15 DIAGNOSIS — E55.9 VITAMIN D DEFICIENCY DISEASE: ICD-10-CM

## 2020-06-15 DIAGNOSIS — F51.01 PRIMARY INSOMNIA: ICD-10-CM

## 2020-06-15 DIAGNOSIS — F06.4 ANXIETY DISORDER DUE TO KNOWN PHYSIOLOGICAL CONDITION: ICD-10-CM

## 2020-06-15 DIAGNOSIS — I10 ESSENTIAL HYPERTENSION: ICD-10-CM

## 2020-06-15 DIAGNOSIS — E78.2 MIXED HYPERLIPIDEMIA: ICD-10-CM

## 2020-06-15 DIAGNOSIS — E66.09 CLASS 1 OBESITY DUE TO EXCESS CALORIES WITH SERIOUS COMORBIDITY IN ADULT, UNSPECIFIED BMI: ICD-10-CM

## 2020-06-15 PROBLEM — H61.23 EXCESSIVE CERUMEN IN BOTH EAR CANALS: Status: RESOLVED | Noted: 2018-11-08 | Resolved: 2020-06-15

## 2020-06-15 PROBLEM — H61.23 BILATERAL IMPACTED CERUMEN: Status: RESOLVED | Noted: 2020-06-01 | Resolved: 2020-06-15

## 2020-06-15 PROCEDURE — G0439 PPPS, SUBSEQ VISIT: HCPCS | Performed by: NURSE PRACTITIONER

## 2020-06-15 NOTE — PROGRESS NOTES
The ABCs of the Annual Wellness Visit  Subsequent Medicare Wellness Visit      CC  Medicare wellness  Hypertension  Hyperlipidemia      Subjective      Vitamin D deficiency-stable supplementation    Anxiety/depression disorder/insomnia-paranoid schizophrenia-under the care of behavioral health/psychiatry.  Receives Elavil.    Hypertension-stable with atenolol and lisinopril.    B12 deficiency- takes an occasional B12 injection.  Has labs to review    Obesity-patient states he has gained quite a bit of weight back during the coronavirus pandemic.  He plans on trying to start exercising.        History of Present Illness:        Alfonso Barnett is a 50 y.o. male who presents for a Subsequent Medicare Wellness Visit.    HEALTH RISK ASSESSMENT    Recent Hospitalizations:  No hospitalization(s) within the last year.    Current Medical Providers:  Patient Care Team:  Carolina Lua APRN as PCP - General (Family Medicine)    Smoking Status:  Social History     Tobacco Use   Smoking Status Never Smoker   Smokeless Tobacco Never Used       Alcohol Consumption:  Social History     Substance and Sexual Activity   Alcohol Use No       Depression Screen:   PHQ-2/PHQ-9 Depression Screening 3/17/2020   Little interest or pleasure in doing things 3   Feeling down, depressed, or hopeless 2   Trouble falling or staying asleep, or sleeping too much 3   Feeling tired or having little energy 3   Poor appetite or overeating 0   Feeling bad about yourself - or that you are a failure or have let yourself or your family down 0   Trouble concentrating on things, such as reading the newspaper or watching television 0   Moving or speaking so slowly that other people could have noticed. Or the opposite - being so fidgety or restless that you have been moving around a lot more than usual 0   Thoughts that you would be better off dead, or of hurting yourself in some way 0   Total Score 11   If you checked off any problems, how difficult  have these problems made it for you to do your work, take care of things at home, or get along with other people? Somewhat difficult       Fall Risk Screen:  STEVEN Fall Risk Assessment has not been completed.    Health Habits and Functional and Cognitive Screening:  Functional & Cognitive Status 5/8/2019   Do you have difficulty preparing food and eating? No   Do you have difficulty bathing yourself, getting dressed or grooming yourself? No   Do you have difficulty using the toilet? No   Do you have difficulty moving around from place to place? No   Do you have trouble with steps or getting out of a bed or a chair? No   Current Diet Well Balanced Diet   Dental Exam Not up to date   Eye Exam Up to date   Exercise (times per week) 7 times per week   Current Exercise Activities Include Yard Work   Do you need help using the phone?  No   Are you deaf or do you have serious difficulty hearing?  No   Do you need help with transportation? No   Do you need help shopping? No   Do you need help preparing meals?  No   Do you need help with housework?  No   Do you need help with laundry? No   Do you need help taking your medications? No   Do you need help managing money? No   Do you ever drive or ride in a car without wearing a seat belt? Yes   Have you felt unusual stress, anger or loneliness in the last month? Yes   Who do you live with? Spouse   If you need help, do you have trouble finding someone available to you? No   Have you been bothered in the last four weeks by sexual problems? No   Do you have difficulty concentrating, remembering or making decisions? -         Does the patient have evidence of cognitive impairment? No    Asprin use counseling:Does not need ASA (and currently is not on it)    Age-appropriate Screening Schedule:  Refer to the list below for future screening recommendations based on patient's age, sex and/or medical conditions. Orders for these recommended tests are listed in the plan section. The  patient has been provided with a written plan.           Health Maintenance   Topic Date Due   • DIABETIC EYE EXAM  05/08/2019   • DXA SCAN  06/15/2020 (Originally 9/12/2016)   • COLONOSCOPY  02/18/2021 (Originally 9/12/2016)   • ZOSTER VACCINE (1 of 2) 06/15/2021 (Originally 5/18/2018)   • INFLUENZA VACCINE  08/01/2020   • HEMOGLOBIN A1C  12/01/2020   • PROSTATE CANCER SCREENING  06/01/2021   • LIPID PANEL  06/01/2021   • URINE MICROALBUMIN  06/01/2021   • TDAP/TD VACCINES (2 - Td) 05/01/2027   • PNEUMOCOCCAL VACCINE (19-64 MEDIUM RISK)  Completed   • DIABETIC FOOT EXAM  Discontinued              Visual Acuity Screening    Right eye Left eye Both eyes   Without correction: 20/30 20/30 20/30   With correction:      Hearing is adequate per whisper test bilateral.       The following portions of the patient's history were reviewed and updated as appropriate: allergies, current medications, past family history, past medical history, past social history, past surgical history and problem list.    Outpatient Medications Prior to Visit   Medication Sig Dispense Refill   • amitriptyline (ELAVIL) 100 MG tablet Take 1 tablet by mouth Every Night. 30 tablet 0   • aspirin  MG tablet Take 1 tablet by mouth Daily. 30 tablet 5   • atenolol (TENORMIN) 25 MG tablet Take 0.5 tablets by mouth 2 (Two) Times a Day. 30 tablet 5   • lisinopril (PRINIVIL,ZESTRIL) 10 MG tablet Take 1 tablet by mouth Daily. 30 tablet 5   • OLANZapine (zyPREXA) 20 MG tablet Take 1 tablet by mouth Every Night. 30 tablet 0   • simvastatin (ZOCOR) 40 MG tablet Take 1 tablet by mouth Every Night. 30 tablet 5   • vitamin D (ERGOCALCIFEROL) 1.25 MG (13882 UT) capsule capsule Take 1 capsule by mouth 1 (One) Time Per Week. 4 capsule 5     Facility-Administered Medications Prior to Visit   Medication Dose Route Frequency Provider Last Rate Last Dose   • cyanocobalamin injection 1,000 mcg  1,000 mcg Intramuscular Q28 Days Carolina Lua, APRN   1,000  [facility] [place] 06/01/20 1127       Patient Active Problem List   Diagnosis   • Paranoid schizophrenia (CMS/HCC)   • Depression   • Hyperlipidemia   • Hypertension   • Anxiety disorder   • Vitamin D deficiency disease   • Drug-induced erectile dysfunction   • Vision changes   • Prostate disorder   • High risk heterosexual behavior   • Itching with irritation   • Class 1 obesity in adult   • Primary insomnia   • Vitamin B 12 deficiency       Advanced Care Planning:  ACP discussion was held with the patient during this visit. Patient does not have an advance directive, declines further assistance.    Review of Systems   Constitutional: Negative for activity change, appetite change, chills, diaphoresis, fatigue and fever.   HENT: Negative for congestion, ear pain, facial swelling, hearing loss, sinus pressure, sinus pain, sore throat, trouble swallowing and voice change.    Eyes: Negative for pain, discharge and visual disturbance.   Respiratory: Negative for apnea, cough, chest tightness, shortness of breath and wheezing.    Cardiovascular: Negative for chest pain, palpitations and leg swelling.   Gastrointestinal: Negative for abdominal pain, blood in stool, constipation, diarrhea, nausea and vomiting.   Endocrine: Negative.    Genitourinary: Negative for difficulty urinating, dysuria and frequency.   Musculoskeletal: Negative for arthralgias, back pain, joint swelling, neck pain and neck stiffness.   Skin: Negative for color change.   Allergic/Immunologic: Negative.    Neurological: Negative for dizziness, seizures, speech difficulty and headaches.   Psychiatric/Behavioral: Positive for sleep disturbance. Negative for confusion, dysphoric mood, self-injury and suicidal ideas. The patient is not nervous/anxious.        Compared to one year ago, the patient feels his physical health is the same.  Compared to one year ago, the patient feels his mental health is the same.    Reviewed chart for potential of high risk medication  "in the elderly: yes  Reviewed chart for potential of harmful drug interactions in the elderly:yes    Objective         Vitals:    06/15/20 0914   BP: 132/82   BP Location: Right arm   Patient Position: Sitting   Cuff Size: Adult   Pulse: 93   Temp: 97.1 °F (36.2 °C)   SpO2: 97%   Weight: 99.7 kg (219 lb 12.8 oz)   Height: 177.8 cm (70\")       Body mass index is 31.54 kg/m².  Discussed the patient's BMI with him. The BMI is above average; BMI management plan is completed.    Physical Exam   Constitutional: He is oriented to person, place, and time. Vital signs are normal. He appears well-developed and well-nourished. No distress.   HENT:   Head: Normocephalic.   Right Ear: External ear normal.   Left Ear: External ear normal.   Nose: Nose normal.   Mouth/Throat: Oropharynx is clear and moist. No oropharyngeal exudate.   Eyes: Pupils are equal, round, and reactive to light. Conjunctivae, EOM and lids are normal. Right eye exhibits no discharge. Left eye exhibits no discharge.   Neck: Normal range of motion. Neck supple. No tracheal deviation present. No thyromegaly present.   Cardiovascular: Normal rate, regular rhythm and normal heart sounds. Exam reveals no gallop and no friction rub.   No murmur heard.  Pulmonary/Chest: Effort normal and breath sounds normal. No respiratory distress. He has no wheezes. He has no rales. He exhibits no tenderness.   Abdominal: Soft. Normal appearance and bowel sounds are normal. He exhibits no distension and no mass. There is no tenderness. There is no rebound and no guarding.   Musculoskeletal: Normal range of motion.   Lymphadenopathy:     He has no cervical adenopathy.   Neurological: He is alert and oriented to person, place, and time. He has normal reflexes.   CN 2-12 grossly intact    Skin: Skin is warm and dry. Capillary refill takes less than 2 seconds. No rash noted. He is not diaphoretic. No erythema.   Psychiatric: He has a normal mood and affect. His speech is normal and " behavior is normal. Judgment and thought content normal. Cognition and memory are normal.   Vitals reviewed.    Lab Results   Component Value Date    HGBA1C 5.50 06/01/2020     Lab Results   Component Value Date    GLUCOSE 105 (H) 06/01/2020    BUN 7 06/01/2020    CREATININE 0.84 06/01/2020    EGFRIFNONA 96 06/01/2020    BCR 8.3 06/01/2020    K 4.5 06/01/2020    CO2 25.0 06/01/2020    CALCIUM 9.8 06/01/2020    ALBUMIN 5.00 06/01/2020    LABIL2 1.8 03/23/2016    AST 18 06/01/2020    ALT 16 06/01/2020     Lab Results   Component Value Date    TSH 1.190 06/01/2020     Lab Results   Component Value Date    TRIG 99 06/01/2020    HDL 61 (H) 06/01/2020    LDL 85 06/01/2020    VLDL 19.8 06/01/2020    HGBA1C 5.50 06/01/2020      Lab Results   Component Value Date    WBC 4.93 06/01/2020    HGB 15.1 06/01/2020    HCT 45.0 06/01/2020    MCV 84.9 06/01/2020     06/01/2020       Assessment/Plan   Medicare Risks and Personalized Health Plan  CMS Preventative Services Quick Reference  Advance Directive Discussion  Cardiovascular risk  Depression/Dysphoria  Obesity/Overweight     The above risks/problems have been discussed with the patient.  Pertinent information has been shared with the patient in the After Visit Summary.  Follow up plans and orders are seen below in the Assessment/Plan Section.    Diagnoses and all orders for this visit:    1. Type 2 diabetes mellitus without complication, without long-term current use of insulin (CMS/[facility]) (Primary)  -     Ambulatory Referral for Diabetic Eye Exam-Optometry  -     CBC & Differential; Future  -     Comprehensive Metabolic Panel; Future  -     TSH; Future  -     Hemoglobin A1c; Future  -     Vitamin D 25 Hydroxy; Future  -     Vitamin B12; Future  -     Lipid Panel; Future  -     MicroAlbumin, Urine, Random - Urine, Clean Catch; Future    2. Vitamin D deficiency disease  -     CBC & Differential; Future  -     Comprehensive Metabolic Panel; Future  -     TSH; Future  -      Hemoglobin A1c; Future  -     Vitamin D 25 Hydroxy; Future  -     Vitamin B12; Future  -     Lipid Panel; Future  -     MicroAlbumin, Urine, Random - Urine, Clean Catch; Future    3. Anxiety disorder due to known physiological condition  -     CBC & Differential; Future  -     Comprehensive Metabolic Panel; Future  -     TSH; Future  -     Hemoglobin A1c; Future  -     Vitamin D 25 Hydroxy; Future  -     Vitamin B12; Future  -     Lipid Panel; Future  -     MicroAlbumin, Urine, Random - Urine, Clean Catch; Future    4. Essential hypertension  -     CBC & Differential; Future  -     Comprehensive Metabolic Panel; Future  -     TSH; Future  -     Hemoglobin A1c; Future  -     Vitamin D 25 Hydroxy; Future  -     Vitamin B12; Future  -     Lipid Panel; Future  -     MicroAlbumin, Urine, Random - Urine, Clean Catch; Future    5. Mixed hyperlipidemia  -     CBC & Differential; Future  -     Comprehensive Metabolic Panel; Future  -     TSH; Future  -     Hemoglobin A1c; Future  -     Vitamin D 25 Hydroxy; Future  -     Vitamin B12; Future  -     Lipid Panel; Future  -     MicroAlbumin, Urine, Random - Urine, Clean Catch; Future    6. Primary insomnia  -     CBC & Differential; Future  -     Comprehensive Metabolic Panel; Future  -     TSH; Future  -     Hemoglobin A1c; Future  -     Vitamin D 25 Hydroxy; Future  -     Vitamin B12; Future  -     Lipid Panel; Future  -     MicroAlbumin, Urine, Random - Urine, Clean Catch; Future    7. Paranoid schizophrenia (CMS/HCC)  -     CBC & Differential; Future  -     Comprehensive Metabolic Panel; Future  -     TSH; Future  -     Hemoglobin A1c; Future  -     Vitamin D 25 Hydroxy; Future  -     Vitamin B12; Future  -     Lipid Panel; Future  -     MicroAlbumin, Urine, Random - Urine, Clean Catch; Future    8. Class 1 obesity due to excess calories with serious comorbidity in adult, unspecified BMI  Assessment & Plan:  Obesity is improving with lifestyle modifications.  Discussed the  patient's BMI.  The BMI is above average; BMI management plan is completed.  General weight loss/lifestyle modification strategies discussed (elicit support from others; identify saboteurs; non-food rewards, etc).  Informal exercise measures discussed, e.g. taking stairs instead of elevator.  Regular aerobic exercise program discussed.      Age appropriate education and safety instruction has been provided. Preventive education/recommendation > 15 minutes. Safety, accident prevention, vaccines, health maintenance concerns, nutrition, diet, exercise and social activity.     Pt has been educated/instructed on diabetic care and protocols.  Diabetic diet instructions provided.  Medication regimen reviewed.  Discussed possible side effects and interactions of current medications.  Sick day rules reviewed. Continue to monitor blood sugar and report abnormal readings as discussed today. Understanding stated by patient.       Pt has been instructed today regarding low fat heart smart diet. Weight management and routine exercise has been recommended. Avoid high fat foods, starchy foods and processed foods. Increase lean meats, fresh vegetables and fresh fruits.     Recent labs reviewed and discussed.  Refill routine medication.  Refer for diabetic eye exam.    Emotional support and active listening provided.   I have discussed diagnosis in detail today allowing time for questions and answers. Pt is aware of reasons to seek urgent or emergent medical care as well as reasons to return to the clinic for evaluation. Possible side effects, interactions and progression of symptoms discussed as well. Pt / family states understanding.     Reviewed disease process, possible complications, reasons for urgent care and possible side effects of medications. Pt/family state understanding.       Follow Up:  Return in about 6 months (around 12/15/2020) for Recheck, labs one week prior.     An After Visit Summary and PPPS were given to the  patient.

## 2020-07-09 DIAGNOSIS — F06.4 ANXIETY DISORDER DUE TO KNOWN PHYSIOLOGICAL CONDITION: ICD-10-CM

## 2020-07-09 DIAGNOSIS — I10 ESSENTIAL HYPERTENSION: ICD-10-CM

## 2020-07-09 DIAGNOSIS — F20.0 PARANOID SCHIZOPHRENIA (HCC): ICD-10-CM

## 2020-07-09 DIAGNOSIS — E55.9 VITAMIN D DEFICIENCY DISEASE: ICD-10-CM

## 2020-07-09 DIAGNOSIS — E78.2 MIXED HYPERLIPIDEMIA: ICD-10-CM

## 2020-07-09 RX ORDER — LISINOPRIL 10 MG/1
10 TABLET ORAL DAILY
Qty: 30 TABLET | Refills: 5 | Status: SHIPPED | OUTPATIENT
Start: 2020-07-09 | End: 2021-01-04 | Stop reason: SDUPTHER

## 2020-07-09 RX ORDER — AMITRIPTYLINE HYDROCHLORIDE 100 MG/1
100 TABLET, FILM COATED ORAL NIGHTLY
Qty: 30 TABLET | Refills: 0 | Status: SHIPPED | OUTPATIENT
Start: 2020-07-09 | End: 2020-07-31 | Stop reason: SDUPTHER

## 2020-07-09 RX ORDER — SIMVASTATIN 40 MG
40 TABLET ORAL NIGHTLY
Qty: 30 TABLET | Refills: 5 | Status: SHIPPED | OUTPATIENT
Start: 2020-07-09 | End: 2021-01-04 | Stop reason: SDUPTHER

## 2020-07-09 RX ORDER — ERGOCALCIFEROL 1.25 MG/1
50000 CAPSULE ORAL WEEKLY
Qty: 4 CAPSULE | Refills: 5 | Status: SHIPPED | OUTPATIENT
Start: 2020-07-09 | End: 2021-01-04 | Stop reason: SDUPTHER

## 2020-07-09 RX ORDER — ASPIRIN 325 MG
325 TABLET, DELAYED RELEASE (ENTERIC COATED) ORAL DAILY
Qty: 30 TABLET | Refills: 5 | Status: SHIPPED | OUTPATIENT
Start: 2020-07-09 | End: 2021-01-04 | Stop reason: SDUPTHER

## 2020-07-09 RX ORDER — ATENOLOL 25 MG/1
12.5 TABLET ORAL 2 TIMES DAILY
Qty: 30 TABLET | Refills: 5 | Status: SHIPPED | OUTPATIENT
Start: 2020-07-09 | End: 2021-01-04 | Stop reason: SDUPTHER

## 2020-07-09 RX ORDER — OLANZAPINE 20 MG/1
20 TABLET ORAL NIGHTLY
Qty: 30 TABLET | Refills: 0 | Status: SHIPPED | OUTPATIENT
Start: 2020-07-09 | End: 2020-07-31 | Stop reason: SDUPTHER

## 2020-07-31 DIAGNOSIS — F06.4 ANXIETY DISORDER DUE TO KNOWN PHYSIOLOGICAL CONDITION: ICD-10-CM

## 2020-07-31 DIAGNOSIS — F20.0 PARANOID SCHIZOPHRENIA (HCC): ICD-10-CM

## 2020-07-31 RX ORDER — AMITRIPTYLINE HYDROCHLORIDE 100 MG/1
100 TABLET, FILM COATED ORAL NIGHTLY
Qty: 30 TABLET | Refills: 0 | Status: SHIPPED | OUTPATIENT
Start: 2020-07-31 | End: 2020-08-25 | Stop reason: SDUPTHER

## 2020-07-31 RX ORDER — OLANZAPINE 20 MG/1
20 TABLET ORAL NIGHTLY
Qty: 30 TABLET | Refills: 0 | Status: SHIPPED | OUTPATIENT
Start: 2020-07-31 | End: 2020-08-25 | Stop reason: SDUPTHER

## 2020-08-25 ENCOUNTER — OFFICE VISIT (OUTPATIENT)
Dept: PSYCHIATRY | Facility: CLINIC | Age: 50
End: 2020-08-25

## 2020-08-25 VITALS
DIASTOLIC BLOOD PRESSURE: 80 MMHG | WEIGHT: 215.4 LBS | HEART RATE: 67 BPM | SYSTOLIC BLOOD PRESSURE: 118 MMHG | BODY MASS INDEX: 30.91 KG/M2

## 2020-08-25 DIAGNOSIS — F06.4 ANXIETY DISORDER DUE TO KNOWN PHYSIOLOGICAL CONDITION: ICD-10-CM

## 2020-08-25 DIAGNOSIS — F20.0 PARANOID SCHIZOPHRENIA (HCC): ICD-10-CM

## 2020-08-25 PROCEDURE — 99213 OFFICE O/P EST LOW 20 MIN: CPT | Performed by: NURSE PRACTITIONER

## 2020-08-25 RX ORDER — OLANZAPINE 20 MG/1
20 TABLET ORAL NIGHTLY
Qty: 30 TABLET | Refills: 1 | Status: SHIPPED | OUTPATIENT
Start: 2020-08-25 | End: 2020-10-09 | Stop reason: SDUPTHER

## 2020-08-25 RX ORDER — AMITRIPTYLINE HYDROCHLORIDE 100 MG/1
100 TABLET, FILM COATED ORAL NIGHTLY
Qty: 30 TABLET | Refills: 1 | Status: SHIPPED | OUTPATIENT
Start: 2020-08-25 | End: 2020-10-09 | Stop reason: SDUPTHER

## 2020-10-09 ENCOUNTER — OFFICE VISIT (OUTPATIENT)
Dept: PSYCHIATRY | Facility: CLINIC | Age: 50
End: 2020-10-09

## 2020-10-09 DIAGNOSIS — F20.0 PARANOID SCHIZOPHRENIA (HCC): ICD-10-CM

## 2020-10-09 DIAGNOSIS — F06.4 ANXIETY DISORDER DUE TO KNOWN PHYSIOLOGICAL CONDITION: ICD-10-CM

## 2020-10-09 PROCEDURE — 99213 OFFICE O/P EST LOW 20 MIN: CPT | Performed by: NURSE PRACTITIONER

## 2020-10-09 RX ORDER — AMITRIPTYLINE HYDROCHLORIDE 100 MG/1
100 TABLET, FILM COATED ORAL NIGHTLY
Qty: 30 TABLET | Refills: 5 | Status: SHIPPED | OUTPATIENT
Start: 2020-10-09 | End: 2021-03-23 | Stop reason: SDUPTHER

## 2020-10-09 RX ORDER — OLANZAPINE 20 MG/1
20 TABLET ORAL NIGHTLY
Qty: 30 TABLET | Refills: 5 | Status: SHIPPED | OUTPATIENT
Start: 2020-10-09 | End: 2020-11-06

## 2020-11-05 DIAGNOSIS — F20.0 PARANOID SCHIZOPHRENIA (HCC): ICD-10-CM

## 2020-11-06 RX ORDER — OLANZAPINE 20 MG/1
20 TABLET ORAL NIGHTLY
Qty: 30 TABLET | Refills: 4 | Status: SHIPPED | OUTPATIENT
Start: 2020-11-06 | End: 2021-03-23 | Stop reason: SDUPTHER

## 2021-01-04 ENCOUNTER — OFFICE VISIT (OUTPATIENT)
Dept: FAMILY MEDICINE CLINIC | Facility: CLINIC | Age: 51
End: 2021-01-04

## 2021-01-04 VITALS — WEIGHT: 219 LBS | BODY MASS INDEX: 31.35 KG/M2 | HEIGHT: 70 IN

## 2021-01-04 DIAGNOSIS — E78.2 MIXED HYPERLIPIDEMIA: ICD-10-CM

## 2021-01-04 DIAGNOSIS — F06.4 ANXIETY DISORDER DUE TO KNOWN PHYSIOLOGICAL CONDITION: Primary | ICD-10-CM

## 2021-01-04 DIAGNOSIS — I10 ESSENTIAL HYPERTENSION: ICD-10-CM

## 2021-01-04 DIAGNOSIS — F20.0 PARANOID SCHIZOPHRENIA (HCC): ICD-10-CM

## 2021-01-04 DIAGNOSIS — E55.9 VITAMIN D DEFICIENCY DISEASE: ICD-10-CM

## 2021-01-04 PROCEDURE — 99442 PR PHYS/QHP TELEPHONE EVALUATION 11-20 MIN: CPT | Performed by: NURSE PRACTITIONER

## 2021-01-04 RX ORDER — LISINOPRIL 10 MG/1
10 TABLET ORAL DAILY
Qty: 30 TABLET | Refills: 5 | Status: SHIPPED | OUTPATIENT
Start: 2021-01-04 | End: 2021-05-24 | Stop reason: SDUPTHER

## 2021-01-04 RX ORDER — ATENOLOL 25 MG/1
12.5 TABLET ORAL 2 TIMES DAILY
Qty: 30 TABLET | Refills: 5 | Status: SHIPPED | OUTPATIENT
Start: 2021-01-04 | End: 2021-05-24 | Stop reason: SDUPTHER

## 2021-01-04 RX ORDER — SIMVASTATIN 40 MG
40 TABLET ORAL NIGHTLY
Qty: 30 TABLET | Refills: 5 | Status: SHIPPED | OUTPATIENT
Start: 2021-01-04 | End: 2021-05-24 | Stop reason: SDUPTHER

## 2021-01-04 RX ORDER — ERGOCALCIFEROL 1.25 MG/1
50000 CAPSULE ORAL WEEKLY
Qty: 4 CAPSULE | Refills: 5 | Status: SHIPPED | OUTPATIENT
Start: 2021-01-04 | End: 2021-05-24 | Stop reason: SDUPTHER

## 2021-01-04 RX ORDER — ASPIRIN 325 MG
325 TABLET, DELAYED RELEASE (ENTERIC COATED) ORAL DAILY
Qty: 30 TABLET | Refills: 5 | Status: SHIPPED | OUTPATIENT
Start: 2021-01-04 | End: 2021-05-24 | Stop reason: SDUPTHER

## 2021-01-04 NOTE — PROGRESS NOTES
Establish patient called office of APRN today to discuss:   CC  Hypertension  Hyperlipidemia      You have chosen to receive care through a telephone visit today. Do you consent to use a telephone visit for your medical care today? Yes     Brief HPI/ROS obtained as follows:    Hypertension-stable with atenolol and lisinopril.  Monitors routinely.  Does receive aspirin daily.    Hyperlipidemia-stable with Zocor.  Did not have fasting labs performed as he forgot.      Vitamin D deficiency-taking a weekly supplement    Depression with anxiety-under the care of behavioral health/psychiatry.  Also has diagnosis of paranoid schizophrenia.  Patient feels his symptoms are stable at this time.  He has had little bit of anxiety that is related to caring for his grandchildren.    The following portions of the patient's history, chief complaint and ROS were reviewed and updated as appropriate per provider:  Allergies, current medications, past family history, past medical history, past social history, past surgical history and problem list.    Review of Systems   Constitutional: Negative for activity change, appetite change, chills and fever.   HENT: Negative for congestion, sinus pressure, sinus pain and sore throat.    Eyes: Negative for pain, discharge and itching.   Respiratory: Negative for cough, chest tightness and shortness of breath.    Cardiovascular: Negative for chest pain and palpitations.   Gastrointestinal: Negative for abdominal pain, constipation, diarrhea, nausea and vomiting.   Genitourinary: Negative for difficulty urinating, dysuria and frequency.   Musculoskeletal: Positive for arthralgias. Negative for neck pain and neck stiffness.   Skin: Negative.    Allergic/Immunologic: Negative for environmental allergies, food allergies and immunocompromised state.   Neurological: Negative for dizziness and headaches.   Hematological: Negative.    Psychiatric/Behavioral: Negative for dysphoric mood, self-injury and  suicidal ideas. The patient is nervous/anxious.        The current allergy list and medication list was reviewed with patient for accuracy.     Assessment      Alert and oriented x3.  Respirations not labored with conversation.  No cough.  Speech normal.  Hearing adequate.  Cooperative.  Mood normal.  Thought process normal.    Diagnoses and all orders for this visit:    1. Anxiety disorder due to known physiological condition (Primary)  Comments:  Paranoid schizophrenia    2. Essential hypertension  Comments:  Continue lisinopril and atenolol, weight loss recommended  Orders:  -     aspirin  MG tablet; Take 1 tablet by mouth Daily.  Dispense: 30 tablet; Refill: 5  -     atenolol (TENORMIN) 25 MG tablet; Take 0.5 tablets by mouth 2 (Two) Times a Day.  Dispense: 30 tablet; Refill: 5  -     lisinopril (PRINIVIL,ZESTRIL) 10 MG tablet; Take 1 tablet by mouth Daily.  Dispense: 30 tablet; Refill: 5    3. Mixed hyperlipidemia  Comments:  Continue statin, fasting lipid panel next week  Orders:  -     simvastatin (ZOCOR) 40 MG tablet; Take 1 tablet by mouth Every Night.  Dispense: 30 tablet; Refill: 5    4. Vitamin D deficiency disease  Comments:  Continue vitamin D, have labs obtained next week  Orders:  -     vitamin D (ERGOCALCIFEROL) 1.25 MG (31559 UT) capsule capsule; Take 1 capsule by mouth 1 (One) Time Per Week.  Dispense: 4 capsule; Refill: 5    5. Paranoid schizophrenia (CMS/[facility])  Comments:  Remain under the care of behavioral health         get labs next week fasting.    I have reviewed medication list and discussed with patient the possible side effects and interactions.  We have discussed purpose for medication, route, dosage, frequency.  Refill routine medications.    Current Outpatient Medications:   •  amitriptyline (ELAVIL) 100 MG tablet, Take 1 tablet by mouth Every Night., Disp: 30 tablet, Rfl: 5  •  aspirin  MG tablet, Take 1 tablet by mouth Daily., Disp: 30 tablet, Rfl: 5  •  atenolol  (TENORMIN) 25 MG tablet, Take 0.5 tablets by mouth 2 (Two) Times a Day., Disp: 30 tablet, Rfl: 5  •  lisinopril (PRINIVIL,ZESTRIL) 10 MG tablet, Take 1 tablet by mouth Daily., Disp: 30 tablet, Rfl: 5  •  OLANZapine (zyPREXA) 20 MG tablet, TAKE 1 TABLET BY MOUTH EVERY NIGHT, Disp: 30 tablet, Rfl: 4  •  simvastatin (ZOCOR) 40 MG tablet, Take 1 tablet by mouth Every Night., Disp: 30 tablet, Rfl: 5  •  vitamin D (ERGOCALCIFEROL) 1.25 MG (37086 UT) capsule capsule, Take 1 capsule by mouth 1 (One) Time Per Week., Disp: 4 capsule, Rfl: 5    Current Facility-Administered Medications:   •  cyanocobalamin injection 1,000 mcg, 1,000 mcg, Intramuscular, Q28 Days, Carolina Lua, ADOLFO, 1,000 mcg at 06/01/20 1127    I have discussed diagnosis in detail today allowing time for questions and answers. Pt is aware of reasons to seek urgent or emergent medical care as well as reasons to return to the clinic for evaluation. Possible side effects, interactions and progression of symptoms discussed as well. Pt / family states understanding.          Pt has been instructed today regarding low fat heart smart diet. Weight management and routine exercise has been recommended. Avoid high fat foods, starchy foods and processed foods. Increase lean meats, fresh vegetables and fresh fruits.       Coronavirus precautions have been reviewed and discussed.  I have discussed the CDC recommendations  of social distancing, hand washing and disinfecting commonly touched items. Reviewed need to notify PCP and self quarantine with mild symptoms.  Discussed procedure to obtain Covid-19 testing and notification of PCP/health dept/ED/Urgent Center if symptoms begin. Understanding verbalized.      Patient instructed and advised to call if symptoms are increasing or new symptoms occur.    Understands reasons for urgent and emergent care.  Patient (& family) verbalized agreement for treatment plan.     Follow-up in 6 months, sooner if needed.   Continue current medication plan.      This visit has been rescheduled as a phone visit to comply with patient safety concerns in accordance with CDC recommendations. Total time of discussion was 12 minutes.

## 2021-01-07 ENCOUNTER — LAB (OUTPATIENT)
Dept: FAMILY MEDICINE CLINIC | Facility: CLINIC | Age: 51
End: 2021-01-07

## 2021-01-07 DIAGNOSIS — I10 ESSENTIAL HYPERTENSION: ICD-10-CM

## 2021-01-07 DIAGNOSIS — F06.4 ANXIETY DISORDER DUE TO KNOWN PHYSIOLOGICAL CONDITION: ICD-10-CM

## 2021-01-07 DIAGNOSIS — E11.9 TYPE 2 DIABETES MELLITUS WITHOUT COMPLICATION, WITHOUT LONG-TERM CURRENT USE OF INSULIN (HCC): ICD-10-CM

## 2021-01-07 DIAGNOSIS — E78.2 MIXED HYPERLIPIDEMIA: ICD-10-CM

## 2021-01-07 DIAGNOSIS — E55.9 VITAMIN D DEFICIENCY DISEASE: ICD-10-CM

## 2021-01-07 DIAGNOSIS — F51.01 PRIMARY INSOMNIA: ICD-10-CM

## 2021-01-07 DIAGNOSIS — F20.0 PARANOID SCHIZOPHRENIA (HCC): ICD-10-CM

## 2021-01-07 LAB
25(OH)D3 SERPL-MCNC: 47.4 NG/ML (ref 30–100)
ALBUMIN SERPL-MCNC: 5 G/DL (ref 3.5–5.2)
ALBUMIN UR-MCNC: <1.2 MG/DL
ALBUMIN/GLOB SERPL: 2.4 G/DL
ALP SERPL-CCNC: 91 U/L (ref 39–117)
ALT SERPL W P-5'-P-CCNC: 20 U/L (ref 1–41)
ANION GAP SERPL CALCULATED.3IONS-SCNC: 11.5 MMOL/L (ref 5–15)
AST SERPL-CCNC: 13 U/L (ref 1–40)
BASOPHILS # BLD AUTO: 0.06 10*3/MM3 (ref 0–0.2)
BASOPHILS NFR BLD AUTO: 0.9 % (ref 0–1.5)
BILIRUB SERPL-MCNC: 0.2 MG/DL (ref 0–1.2)
BUN SERPL-MCNC: 13 MG/DL (ref 6–20)
BUN/CREAT SERPL: 14.9 (ref 7–25)
CALCIUM SPEC-SCNC: 9.3 MG/DL (ref 8.6–10.5)
CHLORIDE SERPL-SCNC: 99 MMOL/L (ref 98–107)
CHOLEST SERPL-MCNC: 191 MG/DL (ref 0–200)
CO2 SERPL-SCNC: 22.5 MMOL/L (ref 22–29)
CREAT SERPL-MCNC: 0.87 MG/DL (ref 0.76–1.27)
DEPRECATED RDW RBC AUTO: 36.8 FL (ref 37–54)
EOSINOPHIL # BLD AUTO: 0.35 10*3/MM3 (ref 0–0.4)
EOSINOPHIL NFR BLD AUTO: 5.1 % (ref 0.3–6.2)
ERYTHROCYTE [DISTWIDTH] IN BLOOD BY AUTOMATED COUNT: 13.1 % (ref 12.3–15.4)
GFR SERPL CREATININE-BSD FRML MDRD: 92 ML/MIN/1.73
GLOBULIN UR ELPH-MCNC: 2.1 GM/DL
GLUCOSE SERPL-MCNC: 105 MG/DL (ref 65–99)
HBA1C MFR BLD: 5.6 % (ref 4.8–5.6)
HCT VFR BLD AUTO: 41.4 % (ref 37.5–51)
HDLC SERPL-MCNC: 53 MG/DL (ref 40–60)
HGB BLD-MCNC: 14.4 G/DL (ref 13–17.7)
IMM GRANULOCYTES # BLD AUTO: 0.01 10*3/MM3 (ref 0–0.05)
IMM GRANULOCYTES NFR BLD AUTO: 0.1 % (ref 0–0.5)
LDLC SERPL CALC-MCNC: 111 MG/DL (ref 0–100)
LDLC/HDLC SERPL: 2.03 {RATIO}
LYMPHOCYTES # BLD AUTO: 2 10*3/MM3 (ref 0.7–3.1)
LYMPHOCYTES NFR BLD AUTO: 29.2 % (ref 19.6–45.3)
MCH RBC QN AUTO: 27.6 PG (ref 26.6–33)
MCHC RBC AUTO-ENTMCNC: 34.8 G/DL (ref 31.5–35.7)
MCV RBC AUTO: 79.5 FL (ref 79–97)
MONOCYTES # BLD AUTO: 0.66 10*3/MM3 (ref 0.1–0.9)
MONOCYTES NFR BLD AUTO: 9.6 % (ref 5–12)
NEUTROPHILS NFR BLD AUTO: 3.77 10*3/MM3 (ref 1.7–7)
NEUTROPHILS NFR BLD AUTO: 55.1 % (ref 42.7–76)
NRBC BLD AUTO-RTO: 0 /100 WBC (ref 0–0.2)
PLATELET # BLD AUTO: 308 10*3/MM3 (ref 140–450)
PMV BLD AUTO: 8.5 FL (ref 6–12)
POTASSIUM SERPL-SCNC: 4.8 MMOL/L (ref 3.5–5.2)
PROT SERPL-MCNC: 7.1 G/DL (ref 6–8.5)
RBC # BLD AUTO: 5.21 10*6/MM3 (ref 4.14–5.8)
SODIUM SERPL-SCNC: 133 MMOL/L (ref 136–145)
TRIGL SERPL-MCNC: 153 MG/DL (ref 0–150)
TSH SERPL DL<=0.05 MIU/L-ACNC: 1.24 UIU/ML (ref 0.27–4.2)
VIT B12 BLD-MCNC: 526 PG/ML (ref 211–946)
VLDLC SERPL-MCNC: 27 MG/DL (ref 5–40)
WBC # BLD AUTO: 6.85 10*3/MM3 (ref 3.4–10.8)

## 2021-01-07 PROCEDURE — 84443 ASSAY THYROID STIM HORMONE: CPT | Performed by: NURSE PRACTITIONER

## 2021-01-07 PROCEDURE — 82607 VITAMIN B-12: CPT | Performed by: NURSE PRACTITIONER

## 2021-01-07 PROCEDURE — 80053 COMPREHEN METABOLIC PANEL: CPT | Performed by: NURSE PRACTITIONER

## 2021-01-07 PROCEDURE — 82306 VITAMIN D 25 HYDROXY: CPT | Performed by: NURSE PRACTITIONER

## 2021-01-07 PROCEDURE — 83036 HEMOGLOBIN GLYCOSYLATED A1C: CPT | Performed by: NURSE PRACTITIONER

## 2021-01-07 PROCEDURE — 85025 COMPLETE CBC W/AUTO DIFF WBC: CPT | Performed by: NURSE PRACTITIONER

## 2021-01-07 PROCEDURE — 80061 LIPID PANEL: CPT | Performed by: NURSE PRACTITIONER

## 2021-01-07 PROCEDURE — 82043 UR ALBUMIN QUANTITATIVE: CPT | Performed by: NURSE PRACTITIONER

## 2021-02-12 ENCOUNTER — PATIENT OUTREACH (OUTPATIENT)
Dept: PHARMACY | Facility: HOSPITAL | Age: 51
End: 2021-02-12

## 2021-02-12 NOTE — OUTREACH NOTE
I spoke with the patient today regarding medication adherence to lisinopril. I educated patient on 90 day supply, he stated that he prefers the 30 day supply. No issues or questions at this time.    Joana Horne, PharmD  02/12/21

## 2021-03-23 ENCOUNTER — OFFICE VISIT (OUTPATIENT)
Dept: PSYCHIATRY | Facility: CLINIC | Age: 51
End: 2021-03-23

## 2021-03-23 VITALS
WEIGHT: 224 LBS | BODY MASS INDEX: 32.14 KG/M2 | SYSTOLIC BLOOD PRESSURE: 130 MMHG | DIASTOLIC BLOOD PRESSURE: 88 MMHG | HEART RATE: 89 BPM

## 2021-03-23 DIAGNOSIS — F06.4 ANXIETY DISORDER DUE TO KNOWN PHYSIOLOGICAL CONDITION: ICD-10-CM

## 2021-03-23 DIAGNOSIS — F20.0 PARANOID SCHIZOPHRENIA (HCC): ICD-10-CM

## 2021-03-23 PROCEDURE — 99213 OFFICE O/P EST LOW 20 MIN: CPT | Performed by: NURSE PRACTITIONER

## 2021-03-23 RX ORDER — OLANZAPINE 20 MG/1
20 TABLET ORAL NIGHTLY
Qty: 30 TABLET | Refills: 4 | Status: SHIPPED | OUTPATIENT
Start: 2021-03-23 | End: 2021-04-20 | Stop reason: SDUPTHER

## 2021-03-23 RX ORDER — AMITRIPTYLINE HYDROCHLORIDE 100 MG/1
100 TABLET, FILM COATED ORAL NIGHTLY
Qty: 30 TABLET | Refills: 5 | Status: SHIPPED | OUTPATIENT
Start: 2021-03-23 | End: 2021-04-20 | Stop reason: SDUPTHER
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
## 2021-04-20 ENCOUNTER — LAB (OUTPATIENT)
Dept: FAMILY MEDICINE CLINIC | Facility: CLINIC | Age: 51
End: 2021-04-20

## 2021-04-20 ENCOUNTER — OFFICE VISIT (OUTPATIENT)
Dept: PSYCHIATRY | Facility: CLINIC | Age: 51
End: 2021-04-20

## 2021-04-20 VITALS
HEART RATE: 82 BPM | WEIGHT: 222.6 LBS | BODY MASS INDEX: 31.94 KG/M2 | SYSTOLIC BLOOD PRESSURE: 142 MMHG | DIASTOLIC BLOOD PRESSURE: 88 MMHG

## 2021-04-20 DIAGNOSIS — F20.0 PARANOID SCHIZOPHRENIA (HCC): ICD-10-CM

## 2021-04-20 DIAGNOSIS — Z79.899 LONG TERM CURRENT USE OF ANTIPSYCHOTIC MEDICATION: Primary | ICD-10-CM

## 2021-04-20 DIAGNOSIS — Z79.899 LONG TERM CURRENT USE OF ANTIPSYCHOTIC MEDICATION: ICD-10-CM

## 2021-04-20 DIAGNOSIS — F06.4 ANXIETY DISORDER DUE TO KNOWN PHYSIOLOGICAL CONDITION: ICD-10-CM

## 2021-04-20 PROCEDURE — 99213 OFFICE O/P EST LOW 20 MIN: CPT | Performed by: NURSE PRACTITIONER

## 2021-04-20 PROCEDURE — 80061 LIPID PANEL: CPT | Performed by: NURSE PRACTITIONER

## 2021-04-20 PROCEDURE — 85027 COMPLETE CBC AUTOMATED: CPT | Performed by: NURSE PRACTITIONER

## 2021-04-20 PROCEDURE — 84443 ASSAY THYROID STIM HORMONE: CPT | Performed by: NURSE PRACTITIONER

## 2021-04-20 PROCEDURE — 80053 COMPREHEN METABOLIC PANEL: CPT | Performed by: NURSE PRACTITIONER

## 2021-04-20 PROCEDURE — 82607 VITAMIN B-12: CPT | Performed by: NURSE PRACTITIONER

## 2021-04-20 PROCEDURE — 83036 HEMOGLOBIN GLYCOSYLATED A1C: CPT | Performed by: NURSE PRACTITIONER

## 2021-04-20 RX ORDER — OLANZAPINE 20 MG/1
20 TABLET ORAL NIGHTLY
Qty: 30 TABLET | Refills: 1 | Status: SHIPPED | OUTPATIENT
Start: 2021-04-20 | End: 2021-05-13 | Stop reason: SDUPTHER

## 2021-04-20 RX ORDER — AMITRIPTYLINE HYDROCHLORIDE 100 MG/1
100 TABLET, FILM COATED ORAL NIGHTLY
Qty: 30 TABLET | Refills: 1 | Status: SHIPPED | OUTPATIENT
Start: 2021-04-20 | End: 2021-05-13 | Stop reason: SDUPTHER

## 2021-04-20 RX ORDER — CHLORHEXIDINE GLUCONATE 0.12 MG/ML
RINSE ORAL
COMMUNITY
Start: 2021-04-12

## 2021-04-21 LAB
ALBUMIN SERPL-MCNC: 4.4 G/DL (ref 3.5–5.2)
ALBUMIN/GLOB SERPL: 2.1 G/DL
ALP SERPL-CCNC: 80 U/L (ref 39–117)
ALT SERPL W P-5'-P-CCNC: 15 U/L (ref 1–41)
ANION GAP SERPL CALCULATED.3IONS-SCNC: 11.8 MMOL/L (ref 5–15)
AST SERPL-CCNC: 17 U/L (ref 1–40)
BILIRUB SERPL-MCNC: 0.2 MG/DL (ref 0–1.2)
BUN SERPL-MCNC: 11 MG/DL (ref 6–20)
BUN/CREAT SERPL: 12.9 (ref 7–25)
CALCIUM SPEC-SCNC: 9.3 MG/DL (ref 8.6–10.5)
CHLORIDE SERPL-SCNC: 99 MMOL/L (ref 98–107)
CHOLEST SERPL-MCNC: 173 MG/DL (ref 0–200)
CO2 SERPL-SCNC: 23.2 MMOL/L (ref 22–29)
CREAT SERPL-MCNC: 0.85 MG/DL (ref 0.76–1.27)
DEPRECATED RDW RBC AUTO: 38.5 FL (ref 37–54)
ERYTHROCYTE [DISTWIDTH] IN BLOOD BY AUTOMATED COUNT: 13 % (ref 12.3–15.4)
GFR SERPL CREATININE-BSD FRML MDRD: 95 ML/MIN/1.73
GLOBULIN UR ELPH-MCNC: 2.1 GM/DL
GLUCOSE SERPL-MCNC: 101 MG/DL (ref 65–99)
HBA1C MFR BLD: 5.21 % (ref 4.8–5.6)
HCT VFR BLD AUTO: 41.7 % (ref 37.5–51)
HDLC SERPL-MCNC: 50 MG/DL (ref 40–60)
HGB BLD-MCNC: 13.9 G/DL (ref 13–17.7)
LDLC SERPL CALC-MCNC: 109 MG/DL (ref 0–100)
LDLC/HDLC SERPL: 2.17 {RATIO}
MCH RBC QN AUTO: 27.4 PG (ref 26.6–33)
MCHC RBC AUTO-ENTMCNC: 33.3 G/DL (ref 31.5–35.7)
MCV RBC AUTO: 82.1 FL (ref 79–97)
PLATELET # BLD AUTO: 321 10*3/MM3 (ref 140–450)
PMV BLD AUTO: 9 FL (ref 6–12)
POTASSIUM SERPL-SCNC: 4.3 MMOL/L (ref 3.5–5.2)
PROT SERPL-MCNC: 6.5 G/DL (ref 6–8.5)
RBC # BLD AUTO: 5.08 10*6/MM3 (ref 4.14–5.8)
SODIUM SERPL-SCNC: 134 MMOL/L (ref 136–145)
TRIGL SERPL-MCNC: 73 MG/DL (ref 0–150)
TSH SERPL DL<=0.05 MIU/L-ACNC: 1.22 UIU/ML (ref 0.27–4.2)
VIT B12 BLD-MCNC: 492 PG/ML (ref 211–946)
VLDLC SERPL-MCNC: 14 MG/DL (ref 5–40)
WBC # BLD AUTO: 4.73 10*3/MM3 (ref 3.4–10.8)

## 2021-05-13 DIAGNOSIS — F20.0 PARANOID SCHIZOPHRENIA (HCC): ICD-10-CM

## 2021-05-13 DIAGNOSIS — F06.4 ANXIETY DISORDER DUE TO KNOWN PHYSIOLOGICAL CONDITION: ICD-10-CM

## 2021-05-13 RX ORDER — AMITRIPTYLINE HYDROCHLORIDE 100 MG/1
100 TABLET, FILM COATED ORAL NIGHTLY
Qty: 30 TABLET | Refills: 1 | Status: SHIPPED | OUTPATIENT
Start: 2021-05-13 | End: 2021-06-10 | Stop reason: SDUPTHER

## 2021-05-13 RX ORDER — OLANZAPINE 20 MG/1
20 TABLET ORAL NIGHTLY
Qty: 30 TABLET | Refills: 1 | Status: SHIPPED | OUTPATIENT
Start: 2021-05-13 | End: 2021-06-10 | Stop reason: SDUPTHER

## 2021-05-24 ENCOUNTER — OFFICE VISIT (OUTPATIENT)
Dept: FAMILY MEDICINE CLINIC | Facility: CLINIC | Age: 51
End: 2021-05-24

## 2021-05-24 VITALS
WEIGHT: 225.8 LBS | BODY MASS INDEX: 32.33 KG/M2 | OXYGEN SATURATION: 99 % | TEMPERATURE: 97.1 F | DIASTOLIC BLOOD PRESSURE: 84 MMHG | HEART RATE: 80 BPM | SYSTOLIC BLOOD PRESSURE: 126 MMHG | HEIGHT: 70 IN

## 2021-05-24 DIAGNOSIS — E78.2 MIXED HYPERLIPIDEMIA: ICD-10-CM

## 2021-05-24 DIAGNOSIS — I10 ESSENTIAL HYPERTENSION: ICD-10-CM

## 2021-05-24 DIAGNOSIS — E55.9 VITAMIN D DEFICIENCY DISEASE: ICD-10-CM

## 2021-05-24 PROCEDURE — 99214 OFFICE O/P EST MOD 30 MIN: CPT | Performed by: NURSE PRACTITIONER

## 2021-05-24 RX ORDER — ERGOCALCIFEROL 1.25 MG/1
50000 CAPSULE ORAL WEEKLY
Qty: 4 CAPSULE | Refills: 5 | Status: SHIPPED | OUTPATIENT
Start: 2021-05-24 | End: 2021-12-07 | Stop reason: SDUPTHER

## 2021-05-24 RX ORDER — LISINOPRIL 10 MG/1
10 TABLET ORAL DAILY
Qty: 30 TABLET | Refills: 5 | Status: SHIPPED | OUTPATIENT
Start: 2021-05-24 | End: 2021-12-07 | Stop reason: SDUPTHER

## 2021-05-24 RX ORDER — ASPIRIN 325 MG
325 TABLET, DELAYED RELEASE (ENTERIC COATED) ORAL DAILY
Qty: 30 TABLET | Refills: 5 | Status: SHIPPED | OUTPATIENT
Start: 2021-05-24 | End: 2021-12-07 | Stop reason: SDUPTHER

## 2021-05-24 RX ORDER — SIMVASTATIN 40 MG
40 TABLET ORAL NIGHTLY
Qty: 30 TABLET | Refills: 5 | Status: SHIPPED | OUTPATIENT
Start: 2021-05-24 | End: 2021-12-07 | Stop reason: SDUPTHER

## 2021-05-24 RX ORDER — ATENOLOL 25 MG/1
12.5 TABLET ORAL 2 TIMES DAILY
Qty: 30 TABLET | Refills: 5 | Status: SHIPPED | OUTPATIENT
Start: 2021-05-24 | End: 2021-12-07 | Stop reason: SDUPTHER

## 2021-05-24 NOTE — PROGRESS NOTES
Subjective   Alfonso Barnett is a 51 y.o. male.     Chief Complaint   Patient presents with   • Med Refill       History of Present Illness     HTN-on Atenolol and lisinopril.  No negative side effects.  He does take in the evenings.  He does not monitor at home.   No CP or palpitations.    Vitamin D deficiency-chronic and ongoing.  Patient is presently taking vitamin D 50,000 units supplement.  No negative side effects of medication are reported.  Vitamin D level is stable with medication use.  Mood/schizophrenia-patient is presently taking amitriptyline 100 mg and Zyprexa 20 mg.  He denies any negative side effects.  Feels that he is doing well overall.  He is followed by the psych NP for medication adjustment and changes.  Hyperlipidemia-ongoing.  Patient is presently taking Zocor 40 mg.  No negative side effects.  Patient denies any negative side effects of cholesterol medication.  No reported myalgia or myopathies.    The following portions of the patient's history were reviewed and updated as appropriate: CC, ROS, allergies, current medications, past family history, past medical history, past social history, past surgical history and problem list.      Review of Systems   Constitutional: Negative for activity change, appetite change and fever.   HENT: Negative for congestion, ear pain, facial swelling, nosebleeds, rhinorrhea, sinus pressure, sore throat and trouble swallowing.    Eyes: Negative for blurred vision, double vision and redness.   Respiratory: Negative for cough, chest tightness, shortness of breath and wheezing.    Cardiovascular: Negative for chest pain, palpitations and leg swelling.   Gastrointestinal: Negative for abdominal pain, blood in stool, constipation and diarrhea.   Endocrine: Negative.    Genitourinary: Negative for dysuria, flank pain, frequency, hematuria and urgency.   Musculoskeletal: Negative for arthralgias, back pain and myalgias.   Skin: Negative.    Allergic/Immunologic:  "Negative.    Neurological: Negative for dizziness, weakness, light-headedness and headache.   Hematological: Negative.    Psychiatric/Behavioral: Negative for self-injury, sleep disturbance and suicidal ideas. The patient is not nervous/anxious.    All other systems reviewed and are negative.      Objective     /84   Pulse 80   Temp 97.1 °F (36.2 °C)   Ht 177.8 cm (70\")   Wt 102 kg (225 lb 12.8 oz)   SpO2 99%   BMI 32.40 kg/m²     Physical Exam  Assessment/Plan     Problem List Items Addressed This Visit        Cardiac and Vasculature    Hyperlipidemia    Overview       Lab Results   Component Value Date    CHOL 222 (H) 10/31/2019    CHLPL 181 03/23/2016    TRIG 150 10/31/2019    HDL 45 10/31/2019     (H) 10/31/2019              Relevant Medications    simvastatin (ZOCOR) 40 MG tablet    Hypertension    Relevant Medications    atenolol (TENORMIN) 25 MG tablet    lisinopril (PRINIVIL,ZESTRIL) 10 MG tablet    aspirin  MG tablet       Endocrine and Metabolic    Vitamin D deficiency disease    Relevant Medications    vitamin D (ERGOCALCIFEROL) 1.25 MG (85184 UT) capsule capsule          Patient's Body mass index is 32.4 kg/m². indicating that he is obese (BMI >30). Obesity-related health conditions include the following: hypertension. Obesity is unchanged. BMI is Is above average.. We discussed portion control and increasing exercise..     Understands disease processes and need for medications.  Understands reasons for urgent and emergent care.  Patient (& family) verbalized agreement for treatment plan.   Emotional support and active listening provided.  Patient provided time to verbalize feelings.    Continue medications as directed.  Reviewed patient's most recent labs with him.  Discussed findings.    We will plan for updated Medicare wellness and physical at time of next appointment.  RTC 3 months, sooner if needed for problems or concerns          This document has been electronically " signed by:  ADOLFO Magdaleno, FNP-C    Dragon disclaimer:  Much of this encounter note is an electronic transcription/translation of spoken language to printed text. The electronic translation of spoken language may permit erroneous, or at times, nonsensical words or phrases to be inadvertently transcribed; Although I have reviewed the note for such errors, some may still exist.

## 2021-06-10 DIAGNOSIS — F06.4 ANXIETY DISORDER DUE TO KNOWN PHYSIOLOGICAL CONDITION: ICD-10-CM

## 2021-06-10 DIAGNOSIS — F20.0 PARANOID SCHIZOPHRENIA (HCC): ICD-10-CM

## 2021-06-10 RX ORDER — OLANZAPINE 20 MG/1
20 TABLET ORAL NIGHTLY
Qty: 30 TABLET | Refills: 1 | Status: SHIPPED | OUTPATIENT
Start: 2021-06-10 | End: 2021-07-01 | Stop reason: SDUPTHER

## 2021-06-10 RX ORDER — AMITRIPTYLINE HYDROCHLORIDE 100 MG/1
100 TABLET, FILM COATED ORAL NIGHTLY
Qty: 30 TABLET | Refills: 1 | Status: SHIPPED | OUTPATIENT
Start: 2021-06-10 | End: 2021-07-01 | Stop reason: SDUPTHER

## 2021-07-01 ENCOUNTER — OFFICE VISIT (OUTPATIENT)
Dept: PSYCHIATRY | Facility: CLINIC | Age: 51
End: 2021-07-01

## 2021-07-01 VITALS
HEART RATE: 84 BPM | BODY MASS INDEX: 32.77 KG/M2 | SYSTOLIC BLOOD PRESSURE: 130 MMHG | DIASTOLIC BLOOD PRESSURE: 72 MMHG | WEIGHT: 228.4 LBS

## 2021-07-01 DIAGNOSIS — F20.0 PARANOID SCHIZOPHRENIA (HCC): ICD-10-CM

## 2021-07-01 DIAGNOSIS — F06.4 ANXIETY DISORDER DUE TO KNOWN PHYSIOLOGICAL CONDITION: ICD-10-CM

## 2021-07-01 PROCEDURE — 99213 OFFICE O/P EST LOW 20 MIN: CPT | Performed by: NURSE PRACTITIONER

## 2021-07-01 RX ORDER — AMITRIPTYLINE HYDROCHLORIDE 100 MG/1
100 TABLET, FILM COATED ORAL NIGHTLY
Qty: 30 TABLET | Refills: 1 | Status: SHIPPED | OUTPATIENT
Start: 2021-07-01 | End: 2021-08-26 | Stop reason: SDUPTHER

## 2021-07-01 RX ORDER — OLANZAPINE 20 MG/1
20 TABLET ORAL NIGHTLY
Qty: 30 TABLET | Refills: 1 | Status: SHIPPED | OUTPATIENT
Start: 2021-07-01 | End: 2021-08-26 | Stop reason: SDUPTHER

## 2021-07-01 NOTE — PROGRESS NOTES
"      Subjective   Alfonso Barnett is a 51 y.o. male is here today for medication management follow-up.    Chief Complaint: schizophrenia    History of Present Illness:    Patient presents today for follow up for medication management for schizophrenia. Patient states his dad passed away and they buried him yesterday. Patient states trying to stay busy and mom has been staying with him. Patient states his mom and dad were  for 50 some years. Patient states he \"broke down\" yesterday due to thinking about dad. Patient states daughter is in [facility]. Patient reports currently depression is at a 6 on a 0-10 scale with 10 being the worst. Patient reports symptoms of depression of depressed mood, dwelling on things sometimes, crying spells, and low energy. Patient states he is doing better than last time and has been doing things to keep busy. Patient reports anxiety is at a 7 on a 0-10 scale with 10 being the worst. Patient states still having the same paranoia but not gotten worse. Patient denies any panic attacks. Patient reports sleeping 6 hours a night and patient denies any nightmares. Patient reports appetite is good and eating at least 2-3 meals a day. Patient denies any auditory or visual hallucinations. Patient adamantly denies any SI or HI. Patient denies any side effects to medications. Patient denies any medical changes since last visit.   The following portions of the patient's history were reviewed and updated as appropriate: allergies, current medications, past family history, past medical history, past social history, past surgical history and problem list.    Review of Systems   Constitutional: Negative.    Respiratory: Negative.    Cardiovascular: Negative.    Gastrointestinal: Negative.    Neurological: Negative.    Psychiatric/Behavioral: Positive for dysphoric mood. The patient is nervous/anxious.        Objective   Physical Exam  Vitals reviewed.   Constitutional:       Appearance: Normal appearance. " He is well-developed and well-groomed.   Neurological:      Mental Status: He is alert.   Psychiatric:         Attention and Perception: Attention and perception normal.         Mood and Affect: Mood is depressed. Affect is blunt.         Speech: Speech normal.         Behavior: Behavior is slowed. Behavior is cooperative.         Thought Content: Thought content is paranoid.         Cognition and Memory: Cognition and memory normal.         Judgment: Judgment normal.       Blood pressure 130/72, pulse 84, weight 104 kg (228 lb 6.4 oz). Body mass index is 32.77 kg/m².    No Known Allergies    Medication List:   Current Outpatient Medications   Medication Sig Dispense Refill   • amitriptyline (ELAVIL) 100 MG tablet Take 1 tablet by mouth Every Night. 30 tablet 1   • aspirin  MG tablet Take 1 tablet by mouth Daily. 30 tablet 5   • atenolol (TENORMIN) 25 MG tablet Take 0.5 tablets by mouth 2 (Two) Times a Day. 30 tablet 5   • chlorhexidine (PERIDEX) 0.12 % solution      • lisinopril (PRINIVIL,ZESTRIL) 10 MG tablet Take 1 tablet by mouth Daily. 30 tablet 5   • OLANZapine (zyPREXA) 20 MG tablet Take 1 tablet by mouth Every Night. 30 tablet 1   • simvastatin (ZOCOR) 40 MG tablet Take 1 tablet by mouth Every Night. 30 tablet 5   • vitamin D (ERGOCALCIFEROL) 1.25 MG (32055 UT) capsule capsule Take 1 capsule by mouth 1 (One) Time Per Week. 4 capsule 5     Current Facility-Administered Medications   Medication Dose Route Frequency Provider Last Rate Last Admin   • cyanocobalamin injection 1,000 mcg  1,000 mcg Intramuscular Q28 Days Carolina Lua APRN   1,000 mcg at 06/01/20 1127       Mental Status Exam:   Hygiene:   good  Cooperation:  Cooperative  Eye Contact:  Good  Psychomotor Behavior:  Slow  Affect:  Blunted  Hopelessness: Denies  Speech:  Normal  Thought Process:  Goal directed and Linear  Thought Content:  Normal  Suicidal:  None  Homicidal:  None  Hallucinations:  None  Delusion:   Paranoid  Memory:  Intact  Orientation:  Person, Place, Time and Situation  Reliability:  fair  Insight:  Fair  Judgement:  Fair  Impulse Control:  Fair  Physical/Medical Issues:  No               Assessment/Plan   Diagnoses and all orders for this visit:    1. Anxiety disorder due to known physiological condition  -     amitriptyline (ELAVIL) 100 MG tablet; Take 1 tablet by mouth Every Night.  Dispense: 30 tablet; Refill: 1    2. Paranoid schizophrenia (CMS/HCC)  -     OLANZapine (zyPREXA) 20 MG tablet; Take 1 tablet by mouth Every Night.  Dispense: 30 tablet; Refill: 1            Discussed medication options with patient. Cont. Zyprexa 20mg daily for schizophrenia. Cont Elavil 100mg daily at night for anxiety.  Reviewed the risks, benefits, and side effects of the medications; patient acknowledged and verbally consented. Patient was instructed on medication side effects, benefits, and also of no treatment.  Patient was given an explanation regarding potential for increased risk of diabetes, lipids, and weight gain.  Labs will be assessed as clinically indicated.  Diet was discussed especially healthy diet choices and increasing activity and exercise.  Patient was strongly urged to continue weight maintance or weight loss efforts.  Patient reported verbalized understanding of instructions.   Patient is agreeable to call the office with any questions, concerns, or worsening of symptoms. Patient is aware to call 911 or go to the nearest ER should begin having SI/HI.          Follow up in eight weeks      Errors in dictation may reflect use of voice recognition software and not all errors in transcription may have been detected prior to signing.              This document has been electronically signed by ADOLFO Barbour   July 1, 2021 10:12 EDT

## 2021-08-05 ENCOUNTER — OFFICE VISIT (OUTPATIENT)
Dept: PSYCHIATRY | Facility: CLINIC | Age: 51
End: 2021-08-05

## 2021-08-05 DIAGNOSIS — F51.05 INSOMNIA DUE TO MENTAL CONDITION: ICD-10-CM

## 2021-08-05 DIAGNOSIS — F06.4 ANXIETY DISORDER DUE TO KNOWN PHYSIOLOGICAL CONDITION: ICD-10-CM

## 2021-08-05 DIAGNOSIS — F43.9 SITUATIONAL STRESS: ICD-10-CM

## 2021-08-05 DIAGNOSIS — F20.0 PARANOID SCHIZOPHRENIA (HCC): Primary | ICD-10-CM

## 2021-08-05 PROCEDURE — 90791 PSYCH DIAGNOSTIC EVALUATION: CPT | Performed by: SOCIAL WORKER

## 2021-08-11 NOTE — PROGRESS NOTES
"Date of Service: August 5, 2021  Time In: 12:50 PM  Time Out: 1:20 PM        IDENTIFYING INFORMATION:   Alfonso Barnett  is a 51 y.o. male who is here today for initial appointment following referral from ADOLFO Sosa.  Patient states he lives in a home next to his mother and states he and his mother are attempting to care for his 3 children.  In fact, the patient brings his 4-year-old granddaughter to the session with him.    CHIEF COMPLIANT: \"Schizophrenia\"    HPI: Patient reports a long history of what he describes as schizophrenia and states he was diagnosed many years ago while hospitalized at the [facility].  Patient states he has been in treatment for many years and states he was with [facility] for several years and decided to make a change.  Patient reports he has struggled with significant paranoid ideation and visual and auditory hallucinations.  However, he states perceptual disturbance is minimal while on medication although he continues to struggle with significant feelings of anxiety and thoughts that others are talking about him or \"out to get him\".  Patient also describes when he was working he had significant difficulty due to significant fear of \"messing something up\" and had difficulty being around others as he struggled with paranoid ideation.  Patient also reports he struggles with periods of insomnia and states he simply has difficulty falling asleep and does not feel tired although he adamantly denies any impulsivity or increased energy.  Patient further reports significant stress in his life at this time as he  from his estranged wife who is living in his mother's building next door with her \"boyfriend\".  The patient states they are both an active addiction and they cause him problems on a daily basis.  Patient reports he has struggled with periods of depression throughout the years but states he believes this is relatively minimal at this time.  " "Patient denies any history of attention deficit disorder or prolonged periods of stella.      PAST PSYCHIATRIC HISTORY: Patient reports [facility] throughout the [facility] and states he simply wanted to make a change.  He is currently in pharmacotherapy with ADOLFO Sosa in this office.  The patient also has been hospitalized at the [facility] 3 times throughout the years.  Patient admits to having suicidal thoughts when he was a teenager but denies any history of suicidal behavior.  The patient adamantly convincingly denies current suicidal ideation, intent, or plan.      SUBSTANCE ABUSE HISTORY: Patient reports when he was \"young\" he drank a great deal of alcohol and \"used pills\" but is unable to provide specifics.  The patient vehemently denies any substance use at this time and states he even stopped smoking cigarettes several years ago.      MEDICAL HISTORY: See medical record      CURRENT MEDICATIONS:  Current Outpatient Medications   Medication Sig Dispense Refill   • amitriptyline (ELAVIL) 100 MG tablet Take 1 tablet by mouth Every Night. 30 tablet 1   • aspirin  MG tablet Take 1 tablet by mouth Daily. 30 tablet 5   • atenolol (TENORMIN) 25 MG tablet Take 0.5 tablets by mouth 2 (Two) Times a Day. 30 tablet 5   • chlorhexidine (PERIDEX) 0.12 % solution      • lisinopril (PRINIVIL,ZESTRIL) 10 MG tablet Take 1 tablet by mouth Daily. 30 tablet 5   • OLANZapine (zyPREXA) 20 MG tablet Take 1 tablet by mouth Every Night. 30 tablet 1   • simvastatin (ZOCOR) 40 MG tablet Take 1 tablet by mouth Every Night. 30 tablet 5   • vitamin D (ERGOCALCIFEROL) 1.25 MG (54078 UT) capsule capsule Take 1 capsule by mouth 1 (One) Time Per Week. 4 capsule 5     Current Facility-Administered Medications   Medication Dose Route Frequency Provider Last Rate Last Admin   • cyanocobalamin injection 1,000 mcg  1,000 mcg Intramuscular Q28 Days Carolina Lua APRN   " "1,000 mcg at 06/01/20 1127         FAMILY HISTORY: Patient reports positive family history of mental illness and states his aunt and uncle have history of schizophrenia and his father was an alcoholic.  Patient is unable to provide further specifics.      SOCIAL HISTORY: Patient grew up with his family of origin and states his childhood was \"okay\".  Patient has 3 children and is currently raising 3 of his grandchildren.  He reports his daughter, who is the children's mother, is currently incarcerated and he is dreading her being released.  Patient is currently receiving disability benefits for his psychiatric diagnosis.  Patient is currently  from his wife who he states is an active addiction and states he is planning to get a divorce.  Patient has not been arrested and has not been in the  and denies any legal issues at this time.  Patient reports he is living in a home on his mother's property at [facility] and states he is attempting to raise his 3 grandchildren with the help of his mother.    Assessment/Plan   Diagnoses and all orders for this visit:    1. Paranoid schizophrenia (CMS/HCC) (Primary)    2. Anxiety disorder due to known physiological condition    3. Situational stress    4. Insomnia due to mental condition      Return in about 3 weeks (around 8/26/2021) for Next scheduled follow up.        MENTAL STATUS EXAM:   Hygiene:   good  Cooperation:  Cooperative  Eye Contact:  Fair  Psychomotor Behavior:  Appropriate  Affect:  Appropriate  Hopelessness: Denies  Speech:  Normal  Thought Process:  Goal directed  Thought Content:  Normal  Suicidal:  None  Homicidal:  None  Hallucinations:  None  Delusion:  None  Memory:  Intact  Orientation:  Person, Place and Time  Reliability:  fair  Insight:  Fair  Judgement:  Fair  Impulse Control:  Fair  Physical/Medical Issues:  No     PROBLEM LIST:   Anxiety, depression,     STRENGTHS:   Willingness to seek treatment, positive therapeutic " relationship with provider in this office, stable housing, sense of responsibility to family    WEAKNESSES:  Chronic mental illness, limited support network, ongoing situational stress due to situation with wife, stress of raising 3 grandchildren      SHORT-TERM GOALS: Patient will be compliant with clinic appointments.  Patient will be engaged in therapy, medication compliant with minimal side effects. Patient  will report decrease of symptoms and frequency.  Patient will maintain stability and avoid higher level of care.    LONG-TERM GOALS: Patient will have cessation of symptoms and be able to function at optimal levels without continued treatment.     PLAN:   Patient will continue in individual outpatient psychotherapy session at [facility] every 3 to 4 weeks and will continue in pharmacotherapy as scheduled with ADOLFO Sosa.       The patient was instructed to contact the clinic, call 911, or present to the nearest emergency room if crisis occurs.       Ru Duggan LCSW, FREDI     This document signed by Ru Duggan LCSW, FREDI August 11, 2021 08:59 EDT

## 2021-08-24 ENCOUNTER — OFFICE VISIT (OUTPATIENT)
Dept: FAMILY MEDICINE CLINIC | Facility: CLINIC | Age: 51
End: 2021-08-24

## 2021-08-24 VITALS
OXYGEN SATURATION: 97 % | HEIGHT: 70 IN | TEMPERATURE: 97.5 F | BODY MASS INDEX: 32.75 KG/M2 | DIASTOLIC BLOOD PRESSURE: 84 MMHG | SYSTOLIC BLOOD PRESSURE: 130 MMHG | WEIGHT: 228.8 LBS | HEART RATE: 98 BPM

## 2021-08-24 DIAGNOSIS — Z12.5 PROSTATE CANCER SCREENING: ICD-10-CM

## 2021-08-24 DIAGNOSIS — I10 ESSENTIAL HYPERTENSION: Primary | ICD-10-CM

## 2021-08-24 DIAGNOSIS — F51.01 PRIMARY INSOMNIA: ICD-10-CM

## 2021-08-24 DIAGNOSIS — E11.9 TYPE 2 DIABETES MELLITUS WITHOUT COMPLICATION, WITHOUT LONG-TERM CURRENT USE OF INSULIN (HCC): ICD-10-CM

## 2021-08-24 DIAGNOSIS — N42.9 PROSTATE DISORDER: ICD-10-CM

## 2021-08-24 DIAGNOSIS — E78.2 MIXED HYPERLIPIDEMIA: ICD-10-CM

## 2021-08-24 DIAGNOSIS — E53.8 VITAMIN B 12 DEFICIENCY: ICD-10-CM

## 2021-08-24 DIAGNOSIS — E55.9 VITAMIN D DEFICIENCY DISEASE: ICD-10-CM

## 2021-08-24 PROCEDURE — 99214 OFFICE O/P EST MOD 30 MIN: CPT | Performed by: NURSE PRACTITIONER

## 2021-08-26 ENCOUNTER — LAB (OUTPATIENT)
Dept: FAMILY MEDICINE CLINIC | Facility: CLINIC | Age: 51
End: 2021-08-26

## 2021-08-26 DIAGNOSIS — F06.4 ANXIETY DISORDER DUE TO KNOWN PHYSIOLOGICAL CONDITION: ICD-10-CM

## 2021-08-26 DIAGNOSIS — F20.0 PARANOID SCHIZOPHRENIA (HCC): ICD-10-CM

## 2021-08-26 LAB
25(OH)D3 SERPL-MCNC: 57.6 NG/ML
ALBUMIN SERPL-MCNC: 4.4 G/DL (ref 3.5–5.2)
ALBUMIN/GLOB SERPL: 1.9 G/DL
ALP SERPL-CCNC: 99 U/L (ref 39–117)
ALT SERPL W P-5'-P-CCNC: 17 U/L (ref 1–41)
ANION GAP SERPL CALCULATED.3IONS-SCNC: 10.4 MMOL/L (ref 5–15)
AST SERPL-CCNC: 17 U/L (ref 1–40)
BASOPHILS # BLD AUTO: 0.07 10*3/MM3 (ref 0–0.2)
BASOPHILS NFR BLD AUTO: 1.1 % (ref 0–1.5)
BILIRUB SERPL-MCNC: 0.2 MG/DL (ref 0–1.2)
BUN SERPL-MCNC: 9 MG/DL (ref 6–20)
BUN/CREAT SERPL: 10 (ref 7–25)
CALCIUM SPEC-SCNC: 9.2 MG/DL (ref 8.6–10.5)
CHLORIDE SERPL-SCNC: 101 MMOL/L (ref 98–107)
CHOLEST SERPL-MCNC: 171 MG/DL (ref 0–200)
CO2 SERPL-SCNC: 21.6 MMOL/L (ref 22–29)
CREAT SERPL-MCNC: 0.9 MG/DL (ref 0.76–1.27)
DEPRECATED RDW RBC AUTO: 38.2 FL (ref 37–54)
EOSINOPHIL # BLD AUTO: 0.23 10*3/MM3 (ref 0–0.4)
EOSINOPHIL NFR BLD AUTO: 3.5 % (ref 0.3–6.2)
ERYTHROCYTE [DISTWIDTH] IN BLOOD BY AUTOMATED COUNT: 13.3 % (ref 12.3–15.4)
GFR SERPL CREATININE-BSD FRML MDRD: 88 ML/MIN/1.73
GLOBULIN UR ELPH-MCNC: 2.3 GM/DL
GLUCOSE SERPL-MCNC: 99 MG/DL (ref 65–99)
HBA1C MFR BLD: 5.7 % (ref 4.8–5.6)
HCT VFR BLD AUTO: 39.2 % (ref 37.5–51)
HDLC SERPL-MCNC: 52 MG/DL (ref 40–60)
HGB BLD-MCNC: 13.6 G/DL (ref 13–17.7)
IMM GRANULOCYTES # BLD AUTO: 0.01 10*3/MM3 (ref 0–0.05)
IMM GRANULOCYTES NFR BLD AUTO: 0.2 % (ref 0–0.5)
LDLC SERPL CALC-MCNC: 92 MG/DL (ref 0–100)
LDLC/HDLC SERPL: 1.68 {RATIO}
LYMPHOCYTES # BLD AUTO: 1.83 10*3/MM3 (ref 0.7–3.1)
LYMPHOCYTES NFR BLD AUTO: 27.7 % (ref 19.6–45.3)
MCH RBC QN AUTO: 27.7 PG (ref 26.6–33)
MCHC RBC AUTO-ENTMCNC: 34.7 G/DL (ref 31.5–35.7)
MCV RBC AUTO: 79.8 FL (ref 79–97)
MONOCYTES # BLD AUTO: 0.66 10*3/MM3 (ref 0.1–0.9)
MONOCYTES NFR BLD AUTO: 10 % (ref 5–12)
NEUTROPHILS NFR BLD AUTO: 3.81 10*3/MM3 (ref 1.7–7)
NEUTROPHILS NFR BLD AUTO: 57.5 % (ref 42.7–76)
NRBC BLD AUTO-RTO: 0 /100 WBC (ref 0–0.2)
PLATELET # BLD AUTO: 342 10*3/MM3 (ref 140–450)
PMV BLD AUTO: 9 FL (ref 6–12)
POTASSIUM SERPL-SCNC: 4.4 MMOL/L (ref 3.5–5.2)
PROT SERPL-MCNC: 6.7 G/DL (ref 6–8.5)
RBC # BLD AUTO: 4.91 10*6/MM3 (ref 4.14–5.8)
SODIUM SERPL-SCNC: 133 MMOL/L (ref 136–145)
T4 FREE SERPL-MCNC: 1.01 NG/DL (ref 0.93–1.7)
TRIGL SERPL-MCNC: 159 MG/DL (ref 0–150)
TSH SERPL DL<=0.05 MIU/L-ACNC: 1.81 UIU/ML (ref 0.27–4.2)
VIT B12 BLD-MCNC: 415 PG/ML (ref 211–946)
VLDLC SERPL-MCNC: 27 MG/DL (ref 5–40)
WBC # BLD AUTO: 6.61 10*3/MM3 (ref 3.4–10.8)

## 2021-08-26 PROCEDURE — 82306 VITAMIN D 25 HYDROXY: CPT | Performed by: NURSE PRACTITIONER

## 2021-08-26 PROCEDURE — 84443 ASSAY THYROID STIM HORMONE: CPT | Performed by: NURSE PRACTITIONER

## 2021-08-26 PROCEDURE — 84154 ASSAY OF PSA FREE: CPT | Performed by: NURSE PRACTITIONER

## 2021-08-26 PROCEDURE — 80061 LIPID PANEL: CPT | Performed by: NURSE PRACTITIONER

## 2021-08-26 PROCEDURE — 84153 ASSAY OF PSA TOTAL: CPT | Performed by: NURSE PRACTITIONER

## 2021-08-26 PROCEDURE — 84439 ASSAY OF FREE THYROXINE: CPT | Performed by: NURSE PRACTITIONER

## 2021-08-26 PROCEDURE — 82607 VITAMIN B-12: CPT | Performed by: NURSE PRACTITIONER

## 2021-08-26 PROCEDURE — 83036 HEMOGLOBIN GLYCOSYLATED A1C: CPT | Performed by: NURSE PRACTITIONER

## 2021-08-26 PROCEDURE — 85025 COMPLETE CBC W/AUTO DIFF WBC: CPT | Performed by: NURSE PRACTITIONER

## 2021-08-26 PROCEDURE — 80053 COMPREHEN METABOLIC PANEL: CPT | Performed by: NURSE PRACTITIONER

## 2021-08-26 RX ORDER — OLANZAPINE 20 MG/1
20 TABLET ORAL NIGHTLY
Qty: 30 TABLET | Refills: 1 | Status: SHIPPED | OUTPATIENT
Start: 2021-08-26 | End: 2021-09-24 | Stop reason: SDUPTHER

## 2021-08-26 RX ORDER — AMITRIPTYLINE HYDROCHLORIDE 100 MG/1
100 TABLET, FILM COATED ORAL NIGHTLY
Qty: 30 TABLET | Refills: 1 | Status: SHIPPED | OUTPATIENT
Start: 2021-08-26 | End: 2021-09-24 | Stop reason: SDUPTHER

## 2021-08-27 LAB
PSA FREE MFR SERPL: 21.7 %
PSA FREE SERPL-MCNC: 0.13 NG/ML
PSA SERPL-MCNC: 0.6 NG/ML (ref 0–4)

## 2021-09-24 ENCOUNTER — OFFICE VISIT (OUTPATIENT)
Dept: PSYCHIATRY | Facility: CLINIC | Age: 51
End: 2021-09-24

## 2021-09-24 VITALS
OXYGEN SATURATION: 97 % | WEIGHT: 230.8 LBS | DIASTOLIC BLOOD PRESSURE: 84 MMHG | HEIGHT: 70 IN | BODY MASS INDEX: 33.04 KG/M2 | SYSTOLIC BLOOD PRESSURE: 122 MMHG | HEART RATE: 79 BPM

## 2021-09-24 DIAGNOSIS — F20.0 PARANOID SCHIZOPHRENIA (HCC): ICD-10-CM

## 2021-09-24 DIAGNOSIS — F06.4 ANXIETY DISORDER DUE TO KNOWN PHYSIOLOGICAL CONDITION: ICD-10-CM

## 2021-09-24 PROCEDURE — 99213 OFFICE O/P EST LOW 20 MIN: CPT | Performed by: NURSE PRACTITIONER

## 2021-09-24 RX ORDER — AMITRIPTYLINE HYDROCHLORIDE 100 MG/1
100 TABLET, FILM COATED ORAL NIGHTLY
Qty: 30 TABLET | Refills: 1 | Status: SHIPPED | OUTPATIENT
Start: 2021-09-24 | End: 2021-10-22 | Stop reason: SDUPTHER

## 2021-09-24 RX ORDER — OLANZAPINE 10 MG/1
10 TABLET ORAL NIGHTLY
Qty: 30 TABLET | Refills: 0
Start: 2021-09-24 | End: 2021-10-22 | Stop reason: SDUPTHER

## 2021-09-24 RX ORDER — ARIPIPRAZOLE 5 MG/1
5 TABLET ORAL DAILY
Qty: 30 TABLET | Refills: 0 | Status: SHIPPED | OUTPATIENT
Start: 2021-09-24 | End: 2021-10-22 | Stop reason: SDUPTHER

## 2021-10-15 DIAGNOSIS — F20.0 PARANOID SCHIZOPHRENIA (HCC): ICD-10-CM

## 2021-10-15 RX ORDER — OLANZAPINE 20 MG/1
20 TABLET ORAL NIGHTLY
Qty: 30 TABLET | Refills: 5 | OUTPATIENT
Start: 2021-10-15

## 2021-10-21 ENCOUNTER — OFFICE VISIT (OUTPATIENT)
Dept: PSYCHIATRY | Facility: CLINIC | Age: 51
End: 2021-10-21

## 2021-10-21 DIAGNOSIS — Z63.9 FAMILY DYNAMICS PROBLEM: ICD-10-CM

## 2021-10-21 DIAGNOSIS — F51.05 INSOMNIA DUE TO MENTAL CONDITION: ICD-10-CM

## 2021-10-21 DIAGNOSIS — F43.9 SITUATIONAL STRESS: ICD-10-CM

## 2021-10-21 DIAGNOSIS — F06.4 ANXIETY DISORDER DUE TO KNOWN PHYSIOLOGICAL CONDITION: ICD-10-CM

## 2021-10-21 DIAGNOSIS — F20.0 PARANOID SCHIZOPHRENIA (HCC): Primary | ICD-10-CM

## 2021-10-21 PROCEDURE — 90832 PSYTX W PT 30 MINUTES: CPT | Performed by: SOCIAL WORKER

## 2021-10-21 SDOH — SOCIAL STABILITY - SOCIAL INSECURITY: PROBLEM RELATED TO PRIMARY SUPPORT GROUP, UNSPECIFIED: Z63.9

## 2021-10-22 ENCOUNTER — OFFICE VISIT (OUTPATIENT)
Dept: PSYCHIATRY | Facility: CLINIC | Age: 51
End: 2021-10-22

## 2021-10-22 VITALS
DIASTOLIC BLOOD PRESSURE: 60 MMHG | HEART RATE: 84 BPM | BODY MASS INDEX: 32.71 KG/M2 | SYSTOLIC BLOOD PRESSURE: 118 MMHG | WEIGHT: 228 LBS

## 2021-10-22 DIAGNOSIS — F06.4 ANXIETY DISORDER DUE TO KNOWN PHYSIOLOGICAL CONDITION: ICD-10-CM

## 2021-10-22 DIAGNOSIS — F20.0 PARANOID SCHIZOPHRENIA (HCC): ICD-10-CM

## 2021-10-22 PROCEDURE — 99213 OFFICE O/P EST LOW 20 MIN: CPT | Performed by: NURSE PRACTITIONER

## 2021-10-22 RX ORDER — ARIPIPRAZOLE 10 MG/1
10 TABLET ORAL DAILY
Qty: 30 TABLET | Refills: 0 | Status: SHIPPED | OUTPATIENT
Start: 2021-10-22 | End: 2021-11-22

## 2021-10-22 RX ORDER — AMITRIPTYLINE HYDROCHLORIDE 100 MG/1
100 TABLET, FILM COATED ORAL NIGHTLY
Qty: 30 TABLET | Refills: 0 | Status: SHIPPED | OUTPATIENT
Start: 2021-10-22 | End: 2021-12-21 | Stop reason: SDUPTHER

## 2021-10-22 RX ORDER — OLANZAPINE 5 MG/1
TABLET ORAL
Qty: 14 TABLET | Refills: 0 | Status: SHIPPED | OUTPATIENT
Start: 2021-10-22 | End: 2021-12-07

## 2021-10-22 RX ORDER — AMITRIPTYLINE HYDROCHLORIDE 25 MG/1
25 TABLET, FILM COATED ORAL NIGHTLY
Qty: 30 TABLET | Refills: 0 | Status: SHIPPED | OUTPATIENT
Start: 2021-10-22 | End: 2021-12-13
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
## 2021-11-22 DIAGNOSIS — F20.0 PARANOID SCHIZOPHRENIA (HCC): ICD-10-CM

## 2021-11-22 RX ORDER — ARIPIPRAZOLE 10 MG/1
10 TABLET ORAL DAILY
Qty: 30 TABLET | Refills: 0 | Status: SHIPPED | OUTPATIENT
Start: 2021-11-22 | End: 2021-12-21 | Stop reason: SDDI

## 2021-12-07 ENCOUNTER — OFFICE VISIT (OUTPATIENT)
Dept: FAMILY MEDICINE CLINIC | Facility: CLINIC | Age: 51
End: 2021-12-07

## 2021-12-07 VITALS
HEIGHT: 70 IN | BODY MASS INDEX: 31.26 KG/M2 | WEIGHT: 218.4 LBS | TEMPERATURE: 97.3 F | SYSTOLIC BLOOD PRESSURE: 124 MMHG | HEART RATE: 84 BPM | DIASTOLIC BLOOD PRESSURE: 84 MMHG | OXYGEN SATURATION: 98 %

## 2021-12-07 DIAGNOSIS — E55.9 VITAMIN D DEFICIENCY DISEASE: ICD-10-CM

## 2021-12-07 DIAGNOSIS — I10 PRIMARY HYPERTENSION: ICD-10-CM

## 2021-12-07 DIAGNOSIS — I10 ESSENTIAL HYPERTENSION: ICD-10-CM

## 2021-12-07 DIAGNOSIS — E53.8 VITAMIN B 12 DEFICIENCY: ICD-10-CM

## 2021-12-07 DIAGNOSIS — E78.2 MIXED HYPERLIPIDEMIA: ICD-10-CM

## 2021-12-07 DIAGNOSIS — Z00.00 VISIT FOR ANNUAL HEALTH EXAMINATION: ICD-10-CM

## 2021-12-07 DIAGNOSIS — H54.7 VISION DECREASED: ICD-10-CM

## 2021-12-07 DIAGNOSIS — Z00.00 MEDICARE ANNUAL WELLNESS VISIT, SUBSEQUENT: Primary | ICD-10-CM

## 2021-12-07 PROCEDURE — G0439 PPPS, SUBSEQ VISIT: HCPCS | Performed by: NURSE PRACTITIONER

## 2021-12-07 PROCEDURE — 1170F FXNL STATUS ASSESSED: CPT | Performed by: NURSE PRACTITIONER

## 2021-12-07 PROCEDURE — 99396 PREV VISIT EST AGE 40-64: CPT | Performed by: NURSE PRACTITIONER

## 2021-12-07 PROCEDURE — 1159F MED LIST DOCD IN RCRD: CPT | Performed by: NURSE PRACTITIONER

## 2021-12-07 PROCEDURE — 1125F AMNT PAIN NOTED PAIN PRSNT: CPT | Performed by: NURSE PRACTITIONER

## 2021-12-07 PROCEDURE — 36415 COLL VENOUS BLD VENIPUNCTURE: CPT | Performed by: NURSE PRACTITIONER

## 2021-12-07 RX ORDER — SIMVASTATIN 40 MG
40 TABLET ORAL NIGHTLY
Qty: 30 TABLET | Refills: 5 | Status: SHIPPED | OUTPATIENT
Start: 2021-12-07 | End: 2022-03-15 | Stop reason: SDUPTHER

## 2021-12-07 RX ORDER — LISINOPRIL 10 MG/1
10 TABLET ORAL DAILY
Qty: 30 TABLET | Refills: 5 | Status: SHIPPED | OUTPATIENT
Start: 2021-12-07 | End: 2022-03-15 | Stop reason: SDUPTHER

## 2021-12-07 RX ORDER — ASPIRIN 325 MG
325 TABLET, DELAYED RELEASE (ENTERIC COATED) ORAL DAILY
Qty: 30 TABLET | Refills: 5 | Status: SHIPPED | OUTPATIENT
Start: 2021-12-07 | End: 2022-03-15

## 2021-12-07 RX ORDER — ATENOLOL 25 MG/1
12.5 TABLET ORAL 2 TIMES DAILY
Qty: 30 TABLET | Refills: 5 | Status: SHIPPED | OUTPATIENT
Start: 2021-12-07 | End: 2022-03-15 | Stop reason: SDUPTHER

## 2021-12-07 RX ORDER — ERGOCALCIFEROL 1.25 MG/1
50000 CAPSULE ORAL WEEKLY
Qty: 4 CAPSULE | Refills: 5 | Status: SHIPPED | OUTPATIENT
Start: 2021-12-07 | End: 2022-03-15 | Stop reason: SDUPTHER

## 2021-12-07 NOTE — PATIENT INSTRUCTIONS
It is important for your health to eat a healthy balanced diet, to exercise as tolerated, obtain dental and vision checkups routinely, work on stress reduction and be active about taking care of your mental health.  Routine age related immunizations(pneumonia, flu, shingles if applicable) are recommended.  Be active in obtaining age and gender routine maintenance exams (such as paps, breast exams, colonscopy, prostate exams, etc).    You are encouraged to have safe sex practices for STD prevention.    Be alert/educated on gun and water safety, seek help for any domestic violence concerns, and seatbelt use is strongly encouraged.        Stress, Adult  Stress is a normal reaction to life events. Stress is what you feel when life demands more than you are used to, or more than you think you can handle. Some stress can be useful, such as studying for a test or meeting a deadline at work. Stress that occurs too often or for too long can cause problems. It can affect your emotional health and interfere with relationships and normal daily activities. Too much stress can weaken your body's defense system (immune system) and increase your risk for physical illness. If you already have a medical problem, stress can make it worse.  What are the causes?  All sorts of life events can cause stress. An event that causes stress for one person may not be stressful for another person. Major life events, whether positive or negative, commonly cause stress. Examples include:  · Losing a job or starting a new job.  · Losing a loved one.  · Moving to a new town or home.  · Getting  or .  · Having a baby.  · Getting injured or sick.  Less obvious life events can also cause stress, especially if they occur day after day or in combination with each other. Examples include:  · Working long hours.  · Driving in traffic.  · Caring for children.  · Being in debt.  · Being in a difficult relationship.  What are the signs or  symptoms?  Stress can cause emotional symptoms, including:  · Anxiety. This is feeling worried, afraid, on edge, overwhelmed, or out of control.  · Anger, including irritation or impatience.  · Depression. This is feeling sad, down, helpless, or guilty.  · Trouble focusing, remembering, or making decisions.  Stress can cause physical symptoms, including:  · Aches and pains. These may affect your head, neck, back, stomach, or other areas of your body.  · Tight muscles or a clenched jaw.  · Low energy.  · Trouble sleeping.  Stress can cause unhealthy behaviors, including:  · Eating to feel better (overeating) or skipping meals.  · Working too much or putting off tasks.  · Smoking, drinking alcohol, or using drugs to feel better.  How is this diagnosed?  Stress is diagnosed through an assessment by your health care provider. He or she may diagnose this condition based on:  · Your symptoms and any stressful life events.  · Your medical history.  · Tests to rule out other causes of your symptoms.  Depending on your condition, your health care provider may refer you to a specialist for further evaluation.  How is this treated?    Stress management techniques are the recommended treatment for stress. Medicine is not typically recommended for the treatment of stress.  Techniques to reduce your reaction to stressful life events include:  · Stress identification. Monitor yourself for symptoms of stress and identify what causes stress for you. These skills may help you to avoid or prepare for stressful events.  · Time management. Set your priorities, keep a calendar of events, and learn to say no. Taking these actions can help you avoid making too many commitments.  Techniques for coping with stress include:  · Rethinking the problem. Try to think realistically about stressful events rather than ignoring them or overreacting. Try to find the positives in a stressful situation rather than focusing on the negatives.  · Exercise.  Physical exercise can release both physical and emotional tension. The key is to find a form of exercise that you enjoy and do it regularly.  · Relaxation techniques. These relax the body and mind. The key is to find one or more that you enjoy and use the techniques regularly. Examples include:  ? Meditation, deep breathing, or progressive relaxation techniques.  ? Yoga or jack chi.  ? Biofeedback, mindfulness techniques, or journaling.  ? Listening to music, being out in nature, or participating in other hobbies.  · Practicing a healthy lifestyle. Eat a balanced diet, drink plenty of water, limit or avoid caffeine, and get plenty of sleep.  · Having a strong support network. Spend time with family, friends, or other people you enjoy being around. Express your feelings and talk things over with someone you trust.  Counseling or talk therapy with a mental health professional may be helpful if you are having trouble managing stress on your own.  Follow these instructions at home:  Lifestyle    · Avoid drugs.  · Do not use any products that contain nicotine or tobacco, such as cigarettes, e-cigarettes, and chewing tobacco. If you need help quitting, ask your health care provider.  · Limit alcohol intake to no more than 1 drink a day for nonpregnant women and 2 drinks a day for men. One drink equals 12 oz of beer, 5 oz of wine, or 1½ oz of hard liquor  · Do not use alcohol or drugs to relax.  · Eat a balanced diet that includes fresh fruits and vegetables, whole grains, lean meats, fish, eggs, and beans, and low-fat dairy. Avoid processed foods and foods high in added fat, sugar, and salt.  · Exercise at least 30 minutes on 5 or more days each week.  · Get 7-8 hours of sleep each night.    General instructions    · Practice stress management techniques as discussed with your health care provider.  · Drink enough fluid to keep your urine clear or pale yellow.  · Take over-the-counter and prescription medicines only as  told by your health care provider.  · Keep all follow-up visits as told by your health care provider. This is important.    Contact a health care provider if:  · Your symptoms get worse.  · You have new symptoms.  · You feel overwhelmed by your problems and can no longer manage them on your own.  Get help right away if:  · You have thoughts of hurting yourself or others.  If you ever feel like you may hurt yourself or others, or have thoughts about taking your own life, get help right away. You can go to your nearest emergency department or call:  · Your local emergency services (911 in the [place]  · A suicide crisis helpline, such as the National Suicide Prevention Lifeline at 1-413.414.4201. This is open 24 hours a day.  Summary  · Stress is a normal reaction to life events. It can cause problems if it happens too often or for too long.  · Practicing stress management techniques is the best way to treat stress.  · Counseling or talk therapy with a mental health professional may be helpful if you are having trouble managing stress on your own.  This information is not intended to replace advice given to you by your health care provider. Make sure you discuss any questions you have with your health care provider.  Document Revised: 07/17/2020 Document Reviewed: 02/07/2018  Elsevier Patient Education © 2021 [employer]    Fall Prevention in the Home, Adult  Falls can cause injuries and can affect people from all age groups. There are many simple things that you can do to make your home safe and to help prevent falls. Ask for help when making these changes, if needed.  What actions can I take to prevent falls?  General instructions  · Use good lighting in all rooms. Replace any light bulbs that burn out.  · Turn on lights if it is dark. Use night-lights.  · Place frequently used items in easy-to-reach places. Lower the shelves around your home if necessary.  · Set up furniture so that there are clear paths around  it. Avoid moving your furniture around.  · Remove throw rugs and other tripping hazards from the floor.  · Avoid walking on wet floors.  · Fix any uneven floor surfaces.  · Add color or contrast paint or tape to grab bars and handrails in your home. Place contrasting color strips on the first and last steps of stairways.  · When you use a stepladder, make sure that it is completely opened and that the sides are firmly locked. Have someone hold the ladder while you are using it. Do not climb a closed stepladder.  · Be aware of any and all pets.  What can I do in the bathroom?         · Keep the floor dry. Immediately clean up any water that spills onto the floor.  · Remove soap buildup in the tub or shower on a regular basis.  · Use non-skid mats or decals on the floor of the tub or shower.  · Attach bath mats securely with double-sided, non-slip rug tape.  · If you need to sit down while you are in the shower, use a plastic, non-slip stool.  · Install grab bars by the toilet and in the tub and shower. Do not use towel bars as grab bars.  What can I do in the bedroom?  · Make sure that a bedside light is easy to reach.  · Do not use oversized bedding that drapes onto the floor.  · Have a firm chair that has side arms to use for getting dressed.  What can I do in the kitchen?  · Clean up any spills right away.  · If you need to reach for something above you, use a sturdy step stool that has a grab bar.  · Keep electrical cables out of the way.  · Do not use floor polish or wax that makes floors slippery. If you must use wax, make sure that it is non-skid floor wax.  What can I do in the stairways?  · Do not leave any items on the stairs.  · Make sure that you have a light switch at the top of the stairs and the bottom of the stairs. Have them installed if you do not have them.  · Make sure that there are handrails on both sides of the stairs. Fix handrails that are broken or loose. Make sure that handrails are as long  as the stairways.  · Install non-slip stair treads on all stairs in your home.  · Avoid having throw rugs at the top or bottom of stairways, or secure the rugs with carpet tape to prevent them from moving.  · Choose a carpet design that does not hide the edge of steps on the stairway.  · Check any carpeting to make sure that it is firmly attached to the stairs. Fix any carpet that is loose or worn.  What can I do on the outside of my home?  · Use bright outdoor lighting.  · Regularly repair the edges of walkways and driveways and fix any cracks.  · Remove high doorway thresholds.  · Trim any shrubbery on the main path into your home.  · Regularly check that handrails are securely fastened and in good repair. Both sides of any steps should have handrails.  · Install guardrails along the edges of any raised decks or porches.  · Clear walkways of debris and clutter, including tools and rocks.  · Have leaves, snow, and ice cleared regularly.  · Use sand or salt on walkways during winter months.  · In the garage, clean up any spills right away, including grease or oil spills.  What other actions can I take?  · Wear closed-toe shoes that fit well and support your feet. Wear shoes that have rubber soles or low heels.  · Use mobility aids as needed, such as canes, walkers, scooters, and crutches.  · Review your medicines with your health care provider. Some medicines can cause dizziness or changes in blood pressure, which increase your risk of falling.  Talk with your health care provider about other ways that you can decrease your risk of falls. This may include working with a physical therapist or  to improve your strength, balance, and endurance.  Where to find more information  · Centers for Disease Control and Prevention, [place]: https://www.cdc.gov  · National [place] on Aging: https://ly9yuco.rey.nih.gov  Contact a health care provider if:  · You are afraid of falling at home.  · You feel weak, drowsy, or  dizzy at home.  · You fall at home.  Summary  · There are many simple things that you can do to make your home safe and to help prevent falls.  · Ways to make your home safe include removing tripping hazards and installing grab bars in the bathroom.  · Ask for help when making these changes in your home.  This information is not intended to replace advice given to you by your health care provider. Make sure you discuss any questions you have with your health care provider.  Document Revised: 11/30/2018 Document Reviewed: 08/02/2018  [employer] Patient Education © 2021 [employer]    Budget-Friendly Healthy Eating  There are many ways to save money at the grocery store and continue to eat healthy. You can be successful if you:  · Plan meals according to your budget.  · Make a grocery list and only purchase food according to your grocery list.  · Prepare food yourself.  What are tips for following this plan?    Reading food labels  · Compare food labels between brand name foods and the store brand. Often the nutritional value is the same, but the store brand is lower cost.  · Look for products that do not have added sugar, fat, or salt (sodium). These often cost the same but are healthier for you. Products may be labeled as:  ? Sugar-free.  ? Nonfat.  ? Low-fat.  ? Sodium-free.  ? Low-sodium.  · Look for lean ground beef labeled as at least 92% lean and 8% fat.  Shopping  · Buy only the items on your grocery list and go only to the areas of the store that have the items on your list.  · Use coupons only for foods and brands you normally buy. Avoid buying items you wouldn't normally buy simply because they are on sale.  · Check online and in newspapers for weekly deals.  · Buy healthy items from the bulk bins when available, such as herbs, spices, flour, pasta, nuts, and dried fruit.  · Buy fruits and vegetables that are in season. Prices are usually lower on in-season produce.  · Look at the unit price on the price  "tag. Use it to compare different brands and sizes to find out which item is the best deal.  · Choose healthy items that are often low-cost, such as carrots, potatoes, apples, bananas, and oranges. Dried or canned beans are a low-cost protein source.  · Buy in bulk and freeze extra food. Items you can buy in bulk include meats, fish, poultry, frozen fruits, and frozen vegetables.  · Avoid buying \"ready-to-eat\" foods, such as pre-cut fruits and vegetables and pre-made salads.  · If possible, shop around to discover where you can find the best prices. Consider other retailers such as dollar stores, larger wholesale stores, local fruit and vegetable [employer], and [employer] markets.  · Do not shop when you are hungry. If you shop while hungry, it may be hard to stick to your list and budget.  · Resist impulse buying. Use your grocery list as your official plan for the week.  · Buy a variety of vegetables and fruits by purchasing fresh, frozen, and canned items.  · Look at the top and bottom shelves for deals. Foods at eye level (eye level of an adult or child) are usually more expensive.  · Be efficient with your time when shopping. The more time you spend at the store, the more money you are likely to spend.  · To save money when choosing more expensive foods like meats and dairy:  ? Choose cheaper cuts of meat, such as bone-in chicken thighs and drumsticks instead of skinless and boneless chicken. When you are ready to prepare the chicken, you can remove the skin yourself to make it healthier.  ? Choose lean meats like chicken or turkey instead of beef.  ? Choose canned seafood, such as tuna, salmon, or sardines.  ? Buy eggs as a low-cost source of protein.  ? Buy dried beans and peas, such as lentils, split peas, or kidney beans instead of meats. Dried beans and peas are a good alternative source of protein.  ? Buy the larger tubs of yogurt instead of individual-sized containers.  · Choose water instead of sodas and other " "sweetened beverages.  · Avoid buying chips, cookies, and other \"junk food.\" These items are usually expensive and not healthy.  Cooking  · Make extra food and freeze the extras in meal-sized containers or in individual portions for fast meals and snacks.  · Pre-cook on days when you have extra time to prepare meals in advance. You can keep these meals in the fridge or freezer and reheat for a quick meal.  · When you come home from the grocery store, wash, peel, and cut fruits and vegetables so they are ready to use and eat. This will help reduce food waste.  Meal planning  · Do not eat out or get fast food. Prepare food at home.  · Make a grocery list and make sure to bring it with you to the store. If you have a smart phone, you could use your phone to create your shopping list.  · Plan meals and snacks according to a grocery list and budget you create.  · Use leftovers in your meal plan for the week.  · Look for recipes where you can cook once and make enough food for two meals.  · Include budget-friendly meals like stews, casseroles, and stir-yates dishes.  · Try some meatless meals or try \"no cook\" meals like salads.  · Make sure that half your plate is filled with fruits or vegetables. Choose from fresh, frozen, or canned fruits and vegetables. If eating canned, remember to rinse them before eating. This will remove any excess salt added for packaging.  Summary  · Eating healthy on a budget is possible if you plan your meals according to your budget, purchase according to your budget and grocery list, and prepare food yourself.  · Tips for buying more food on a limited budget include buying generic brands, using coupons only for foods you normally buy, and buying healthy items from the bulk bins when available.  · Tips for buying cheaper food to replace expensive food include choosing cheaper, lean cuts of meat, and buying dried beans and peas.  This information is not intended to replace advice given to you by " your health care provider. Make sure you discuss any questions you have with your health care provider.  Document Revised: 12/19/2018 Document Reviewed: 12/19/2018  Elsevier Patient Education © 2021 [employer]    Advance Directive    Advance directives are legal documents that let you make choices ahead of time about your health care and medical treatment in case you become unable to communicate for yourself. Advance directives are a way for you to make known your wishes to family, friends, and health care providers. This can let others know about your end-of-life care if you become unable to communicate.  Discussing and writing advance directives should happen over time rather than all at once. Advance directives can be changed depending on your situation and what you want, even after you have signed the advance directives.  There are different types of advance directives, such as:  · Medical power of .  · Living will.  · Do not resuscitate (DNR) or do not attempt resuscitation (DNAR) order.  Health care proxy and medical power of   A health care proxy is also called a health care agent. This is a person who is appointed to make medical decisions for you in cases where you are unable to make the decisions yourself. Generally, people choose someone they know well and trust to represent their preferences. Make sure to ask this person for an agreement to act as your proxy. A proxy may have to exercise judgment in the event of a medical decision for which your wishes are not known.  A medical power of  is a legal document that names your health care proxy. Depending on the laws in your state, after the document is written, it may also need to be:  · Signed.  · Notarized.  · Dated.  · Copied.  · Witnessed.  · Incorporated into your medical record.  You may also want to appoint someone to manage your money in a situation in which you are unable to do so. This is called a durable power of   for finances. It is a separate legal document from the durable power of  for health care. You may choose the same person or someone different from your health care proxy to act as your agent in money matters.  If you do not appoint a proxy, or if there is a concern that the proxy is not acting in your best interests, a court may appoint a guardian to act on your behalf.  Living will  A living will is a set of instructions that state your wishes about medical care when you cannot express them yourself. Health care providers should keep a copy of your living will in your medical record. You may want to give a copy to family members or friends. To alert caregivers in case of an emergency, you can place a card in your wallet to let them know that you have a living will and where they can find it. A living will is used if you become:  · Terminally ill.  · Disabled.  · Unable to communicate or make decisions.  Items to consider in your living will include:  · To use or not to use life-support equipment, such as dialysis machines and breathing machines (ventilators).  · A DNR or DNAR order. This tells health care providers not to use cardiopulmonary resuscitation (CPR) if breathing or heartbeat stops.  · To use or not to use tube feeding.  · To be given or not to be given food and fluids.  · Comfort (palliative) care when the goal becomes comfort rather than a cure.  · Donation of organs and tissues.  A living will does not give instructions for distributing your money and property if you should pass away.  DNR or DNAR  A DNR or DNAR order is a request not to have CPR in the event that your heart stops beating or you stop breathing. If a DNR or DNAR order has not been made and shared, a health care provider will try to help any patient whose heart has stopped or who has stopped breathing. If you plan to have surgery, talk with your health care provider about how your DNR or DNAR order will be followed if  problems occur.  What if I do not have an advance directive?  If you do not have an advance directive, some states assign family decision makers to act on your behalf based on how closely you are related to them. Each state has its own laws about advance directives. You may want to check with your health care provider, , or state representative about the laws in your state.  Summary  · Advance directives are the legal documents that allow you to make choices ahead of time about your health care and medical treatment in case you become unable to tell others about your care.  · The process of discussing and writing advance directives should happen over time. You can change the advance directives, even after you have signed them.  · Advance directives include DNR or DNAR orders, living yao, and designating an agent as your medical power of .  This information is not intended to replace advice given to you by your health care provider. Make sure you discuss any questions you have with your health care provider.  Document Revised: 01/28/2021 Document Reviewed: 07/16/2020  Elsevier Patient Education © 2021 [employer]

## 2021-12-07 NOTE — PROGRESS NOTES
The ABCs of the Annual Wellness Visit  Subsequent Medicare Wellness Visit    Chief Complaint   Patient presents with   • Annual Exam      Subjective    History of Present Illness:  Alfonso Barnett is a 51 y.o. male who presents for a Subsequent Medicare Wellness Visit.    Hypertension-chronic and ongoing.  Patient is currently taking lisinopril 10 mg and atenolol 25 mg.  No negative side effects.  No associated symptoms such as CP, SOA, HA, or dizziness.  He is not checking BP at home.    Hyperlipidemia-chronic and ongoing.  Patient is currently taking simvastatin 40 mg.  No negative side effects.  Patient denies any negative side effects of cholesterol medication.  No reported myalgia or myopathies.  Schizophrenia/depression/anxiety -ongoing.  Patient has been weaned off Zyprexa.  He is currently on Elavil 125 mg and Abilify 10 mg.  No negative side effects are reported.  He is under the care of psych NP.  He reports he is sleeping well.    Vitamin D deficiency-chronic and ongoing.  Patient is presently taking vitamin D 50,000 units supplement.  No negative side effects of medication are reported.  Vitamin D level is stable with medication use.  Weight loss-he reports he has cut down on his portions and has been actively working on weight loss.  He feels been off the Zyprexa has also helped he does try to be active daily.  He helps to care for his grandchildren who are younger which keeps him busy.  Vitamin B12 deficiency-chronic and ongoing.  Patient is presently taking vitamin B12 supplement.  Patient is tolerating well and denies any negative side effects.      The following portions of the patient's history were reviewed and updated as appropriate: CC, ROS, allergies, current medications, past family history, past medical history, past social history, past surgical history and problem list.    Compared to one year ago, the patient feels his physical   health is worse.    Compared to one year ago, the patient feels  "his mental   health is the same.    Recent Hospitalizations:  He was not admitted to the hospital during the last year.       Current Medical Providers:  Patient Care Team:  Aster Pa APRN as PCP - General (Family Medicine)    Outpatient Medications Prior to Visit   Medication Sig Dispense Refill   • amitriptyline (ELAVIL) 100 MG tablet Take 1 tablet by mouth Every Night. 30 tablet 0   • ARIPiprazole (ABILIFY) 10 MG tablet TAKE 1 TABLET BY MOUTH DAILY. 30 tablet 0   • chlorhexidine (PERIDEX) 0.12 % solution      • amitriptyline (ELAVIL) 25 MG tablet Take 1 tablet by mouth Every Night. 30 tablet 0   • aspirin  MG tablet Take 1 tablet by mouth Daily. 30 tablet 5   • atenolol (TENORMIN) 25 MG tablet Take 0.5 tablets by mouth 2 (Two) Times a Day. 30 tablet 5   • lisinopril (PRINIVIL,ZESTRIL) 10 MG tablet Take 1 tablet by mouth Daily. 30 tablet 5   • simvastatin (ZOCOR) 40 MG tablet Take 1 tablet by mouth Every Night. 30 tablet 5   • vitamin D (ERGOCALCIFEROL) 1.25 MG (46633 UT) capsule capsule Take 1 capsule by mouth 1 (One) Time Per Week. 4 capsule 5   • OLANZapine (zyPREXA) 5 MG tablet Take 1 tablet by mouth Every Night for 7 days, THEN 0.5 tablets Every Night for 7 days. Then discontinue 14 tablet 0   • cyanocobalamin injection 1,000 mcg        No facility-administered medications prior to visit.      Visual Acuity Screening    Right eye Left eye Both eyes   Without correction: 20/30 20/30 20/30   With correction:            No opioid medication identified on active medication list. I have reviewed chart for other potential  high risk medication/s and harmful drug interactions in the elderly.        /84   Pulse 84   Temp 97.3 °F (36.3 °C)   Ht 177.8 cm (70\")   Wt 99.1 kg (218 lb 6.4 oz)   SpO2 98%   BMI 31.34 kg/m²     Aspirin is on active medication list. Aspirin use is indicated based on review of current medical condition/s. Pros and cons of this therapy have been discussed today. Benefits " of this medication outweigh potential harm.  Patient has been encouraged to continue taking this medication.  .      Patient Active Problem List   Diagnosis   • Paranoid schizophrenia (HCC)   • Depression   • Hyperlipidemia   • Hypertension   • Anxiety disorder   • Vitamin D deficiency disease   • Drug-induced erectile dysfunction   • Vision changes   • Prostate disorder   • High risk heterosexual behavior   • Itching with irritation   • Class 1 obesity in adult   • Primary insomnia   • Vitamin B 12 deficiency     Advance Care Planning  Advance Directive is not on file.  ACP discussion was held with the patient during this visit. Patient does not have an advance directive, information provided.    Review of Systems   Constitutional: Positive for fatigue. Negative for activity change, appetite change, diaphoresis and fever.   HENT: Negative for congestion, ear pain, hearing loss, nosebleeds, postnasal drip, rhinorrhea, sinus pressure, sore throat and trouble swallowing.    Eyes: Negative for pain, redness and visual disturbance.   Respiratory: Negative for cough, choking, chest tightness, shortness of breath and wheezing.    Cardiovascular: Negative for chest pain, palpitations and leg swelling.   Gastrointestinal: Negative for abdominal pain, blood in stool, constipation, diarrhea and vomiting.   Endocrine: Negative.    Genitourinary: Negative for dysuria, flank pain, hematuria and urgency.   Musculoskeletal: Positive for arthralgias (occasional). Negative for back pain, joint swelling, myalgias and neck pain.   Skin: Negative.  Negative for color change and rash.   Allergic/Immunologic: Negative.    Neurological: Negative for dizziness, syncope and weakness.   Hematological: Negative.    Psychiatric/Behavioral: Negative for agitation, decreased concentration, dysphoric mood, self-injury, sleep disturbance and suicidal ideas. The patient is not nervous/anxious.    All other systems reviewed and are negative.      "  Objective    Vitals:    12/07/21 1345   BP: 124/84   Pulse: 84   Temp: 97.3 °F (36.3 °C)   SpO2: 98%   Weight: 99.1 kg (218 lb 6.4 oz)   Height: 177.8 cm (70\")     BMI Readings from Last 1 Encounters:   12/07/21 31.34 kg/m²   BMI is above normal parameters. Recommendations include: educational material    Does the patient have evidence of cognitive impairment? No    Physical Exam  Vitals reviewed.   Constitutional:       General: He is not in acute distress.     Appearance: He is well-developed. He is not diaphoretic.   HENT:      Head: Normocephalic and atraumatic.      Jaw: No tenderness.      Comments: Oropharynx not examined.  Patient is presently wearing a face covering/mask due to COVID-19 pandemic.     Right Ear: Hearing, tympanic membrane, ear canal and external ear normal.      Left Ear: Hearing, tympanic membrane, ear canal and external ear normal.   Eyes:      General: Lids are normal. No scleral icterus.     Extraocular Movements:      Right eye: Normal extraocular motion and no nystagmus.      Left eye: Normal extraocular motion and no nystagmus.      Conjunctiva/sclera: Conjunctivae normal.      Pupils: Pupils are equal, round, and reactive to light.   Neck:      Thyroid: No thyromegaly or thyroid tenderness.      Vascular: No carotid bruit or JVD.      Trachea: No tracheal tenderness.   Cardiovascular:      Rate and Rhythm: Normal rate and regular rhythm.      Pulses:           Dorsalis pedis pulses are 2+ on the right side and 2+ on the left side.        Posterior tibial pulses are 2+ on the right side and 2+ on the left side.      Heart sounds: Normal heart sounds, S1 normal and S2 normal. No murmur heard.      Pulmonary:      Effort: Pulmonary effort is normal. No accessory muscle usage, prolonged expiration or respiratory distress.      Breath sounds: Normal breath sounds.   Chest:      Chest wall: No tenderness.   Abdominal:      General: Bowel sounds are normal.      Palpations: Abdomen is " soft. There is no mass.      Tenderness: There is no abdominal tenderness.   Musculoskeletal:         General: No tenderness.      Cervical back: Normal range of motion and neck supple.      Right lower leg: No edema.      Left lower leg: No edema.      Comments: No muscular atrophy or flaccidity.   Lymphadenopathy:      Head:      Right side of head: No submental or submandibular adenopathy.      Left side of head: No submental or submandibular adenopathy.      Cervical: No cervical adenopathy.      Right cervical: No superficial cervical adenopathy.     Left cervical: No superficial cervical adenopathy.   Skin:     General: Skin is warm and dry.      Capillary Refill: Capillary refill takes less than 2 seconds.      Coloration: Skin is not jaundiced or pale.      Findings: No erythema.      Nails: There is no clubbing.   Neurological:      Mental Status: He is alert and oriented to person, place, and time.      Cranial Nerves: No cranial nerve deficit or facial asymmetry.      Sensory: No sensory deficit.      Motor: No tremor, atrophy or abnormal muscle tone.      Coordination: Coordination normal.      Deep Tendon Reflexes: Reflexes are normal and symmetric.   Psychiatric:         Attention and Perception: He is attentive.         Mood and Affect: Mood normal.         Speech: Speech normal.         Behavior: Behavior normal. Behavior is cooperative.         Thought Content: Thought content normal.         Judgment: Judgment normal.                 HEALTH RISK ASSESSMENT    Smoking Status:  Social History     Tobacco Use   Smoking Status Never Smoker   Smokeless Tobacco Never Used     Alcohol Consumption:  Social History     Substance and Sexual Activity   Alcohol Use No     Fall Risk Screen:    STEADI Fall Risk Assessment was completed, and patient is at LOW risk for falls.Assessment completed on:12/7/2021    Depression Screening:  PHQ-2/PHQ-9 Depression Screening 12/7/2021   Little interest or pleasure in doing  things 0   Feeling down, depressed, or hopeless 0   Trouble falling or staying asleep, or sleeping too much -   Feeling tired or having little energy -   Poor appetite or overeating -   Feeling bad about yourself - or that you are a failure or have let yourself or your family down -   Trouble concentrating on things, such as reading the newspaper or watching television -   Moving or speaking so slowly that other people could have noticed. Or the opposite - being so fidgety or restless that you have been moving around a lot more than usual -   Thoughts that you would be better off dead, or of hurting yourself in some way -   Total Score 0   If you checked off any problems, how difficult have these problems made it for you to do your work, take care of things at home, or get along with other people? -       Health Habits and Functional and Cognitive Screening:  Functional & Cognitive Status 12/7/2021   Do you have difficulty preparing food and eating? No   Do you have difficulty bathing yourself, getting dressed or grooming yourself? No   Do you have difficulty using the toilet? No   Do you have difficulty moving around from place to place? No   Do you have trouble with steps or getting out of a bed or a chair? No   Current Diet Unhealthy Diet   Dental Exam Not up to date   Eye Exam Not up to date   Exercise (times per week) 5 times per week   Current Exercises Include Walking   Current Exercise Activities Include -   Do you need help using the phone?  No   Are you deaf or do you have serious difficulty hearing?  No   Do you need help with transportation? No   Do you need help shopping? No   Do you need help preparing meals?  No   Do you need help with housework?  No   Do you need help with laundry? No   Do you need help taking your medications? No   Do you need help managing money? No   Do you ever drive or ride in a car without wearing a seat belt? No   Have you felt unusual stress, anger or loneliness in the last  month? No   Who do you live with? Other   If you need help, do you have trouble finding someone available to you? No   Have you been bothered in the last four weeks by sexual problems? No   Do you have difficulty concentrating, remembering or making decisions? No       Age-appropriate Screening Schedule:  Refer to the list below for future screening recommendations based on patient's age, sex and/or medical conditions. Orders for these recommended tests are listed in the plan section. The patient has been provided with a written plan.    Health Maintenance   Topic Date Due   • DXA SCAN  Never done   • ZOSTER VACCINE (1 of 2) Never done   • DIABETIC EYE EXAM  05/08/2019   • INFLUENZA VACCINE  03/31/2022 (Originally 8/1/2021)   • URINE MICROALBUMIN  01/07/2022   • HEMOGLOBIN A1C  02/26/2022   • PROSTATE CANCER SCREENING  08/26/2022   • LIPID PANEL  08/26/2022   • TDAP/TD VACCINES (2 - Td or Tdap) 05/01/2027   • DIABETIC FOOT EXAM  Discontinued              Assessment/Plan   CMS Preventative Services Quick Reference  Risk Factors Identified During Encounter  Immunizations Discussed/Encouraged (specific Immunizations; Influenza and COVID19  The above risks/problems have been discussed with the patient.  Follow up actions/plans if indicated are seen below in the Assessment/Plan Section.  Pertinent information has been shared with the patient in the After Visit Summary.    Diagnoses and all orders for this visit:    1. Medicare annual wellness visit, subsequent (Primary)    2. Visit for annual health examination    3. Primary hypertension  Assessment & Plan:  Continue lisinopril 10 mg.  Continue atenolol 25 mg.  Ambulatory BP monitoring either at home or random community checks.  Patient to report continued elevations >140/90.  Patient may come by office for checks if needed.       4. Mixed hyperlipidemia  Comments:  Continue statin, fasting lipid panel next week  Assessment & Plan:  Continue simvastatin 40 mg.  Lipid  panel ordered.  Continue to work on diet changes avoid fried and fatty foods    Orders:  -     simvastatin (ZOCOR) 40 MG tablet; Take 1 tablet by mouth Every Night.  Dispense: 30 tablet; Refill: 5    5. Vitamin D deficiency disease  Comments:  Continue vitamin D, have labs obtained next week  Assessment & Plan:  Continue vitamin D as directed.  Report any negative side effects.  We will continue to monitor vitamin D labs and adjust supplement if necessary.    Orders:  -     vitamin D (ERGOCALCIFEROL) 1.25 MG (49940 UT) capsule capsule; Take 1 capsule by mouth 1 (One) Time Per Week.  Dispense: 4 capsule; Refill: 5    6. Essential hypertension  Comments:  Continue lisinopril and atenolol, weight loss recommended  Orders:  -     atenolol (TENORMIN) 25 MG tablet; Take 0.5 tablets by mouth 2 (Two) Times a Day.  Dispense: 30 tablet; Refill: 5  -     lisinopril (PRINIVIL,ZESTRIL) 10 MG tablet; Take 1 tablet by mouth Daily.  Dispense: 30 tablet; Refill: 5  -     aspirin  MG tablet; Take 1 tablet by mouth Daily.  Dispense: 30 tablet; Refill: 5    7. Vision decreased  Comments:  Referral for eye exam  Orders:  -     Ambulatory Referral to Ophthalmology    8. Vitamin B 12 deficiency  -     Cyanocobalamin (Vitamin B12) 1000 MCG tablet controlled-release; Take 1 tablet by mouth Daily.  Dispense: 30 tablet; Refill: 5      Follow Up:   Return in about 3 months (around 3/7/2022) for HTN, Allergies.     Counseling provided today including importance of good nutrition, exercise as tolerated, dental health, stress reduction and mental health. Importance of immunizations discussed.  Routine maintenance such as paps and breast exams recommended.   Counseled on safe sex practices and STD prevention.   Counseling regarding gun and water safety, domestic violence, and seatbelt use.      Dietary counseling provided:  Healthy food choices including fruits and vegetables, limit soda and junk foods, adequate water intake.  Increase in  physical activity advised at least 30 minutes per day 3-5 days per week.    Patient assessed today for fall risk.  Patient advised to limit clutter, have well lit areas, do not walk around in darkness (use nightlight PRN), Non skid rugs if using rugs, use caution if small pets at home.  Non skid foot wear.      RTC 3-4 months, sooner if needed.       An After Visit Summary and PPPS were made available to the patient.                 This document has been electronically signed by:  ADOLFO Magdaleno FNP-C Dragon disclaimer:  Part of this note may be an electronic transcription/translation of spoken language to printed text using the Dragon Dictation System.

## 2021-12-11 DIAGNOSIS — F06.4 ANXIETY DISORDER DUE TO KNOWN PHYSIOLOGICAL CONDITION: ICD-10-CM

## 2021-12-13 RX ORDER — AMITRIPTYLINE HYDROCHLORIDE 25 MG/1
TABLET, FILM COATED ORAL
Qty: 30 TABLET | Refills: 0 | Status: SHIPPED | OUTPATIENT
Start: 2021-12-13 | End: 2021-12-21 | Stop reason: SDUPTHER

## 2021-12-16 NOTE — ASSESSMENT & PLAN NOTE
Continue simvastatin 40 mg.  Lipid panel ordered.  Continue to work on diet changes avoid fried and fatty foods

## 2021-12-16 NOTE — ASSESSMENT & PLAN NOTE
Continue lisinopril 10 mg.  Continue atenolol 25 mg.  Ambulatory BP monitoring either at home or random community checks.  Patient to report continued elevations >140/90.  Patient may come by office for checks if needed.

## 2021-12-17 DIAGNOSIS — F20.0 PARANOID SCHIZOPHRENIA (HCC): ICD-10-CM

## 2021-12-17 RX ORDER — OLANZAPINE 20 MG/1
20 TABLET ORAL NIGHTLY
Qty: 30 TABLET | Refills: 1 | OUTPATIENT
Start: 2021-12-17

## 2021-12-21 ENCOUNTER — OFFICE VISIT (OUTPATIENT)
Dept: PSYCHIATRY | Facility: CLINIC | Age: 51
End: 2021-12-21

## 2021-12-21 VITALS
SYSTOLIC BLOOD PRESSURE: 128 MMHG | WEIGHT: 215 LBS | HEART RATE: 80 BPM | BODY MASS INDEX: 30.85 KG/M2 | DIASTOLIC BLOOD PRESSURE: 70 MMHG

## 2021-12-21 DIAGNOSIS — F06.4 ANXIETY DISORDER DUE TO KNOWN PHYSIOLOGICAL CONDITION: ICD-10-CM

## 2021-12-21 DIAGNOSIS — F20.0 PARANOID SCHIZOPHRENIA (HCC): Primary | ICD-10-CM

## 2021-12-21 PROCEDURE — 99213 OFFICE O/P EST LOW 20 MIN: CPT | Performed by: NURSE PRACTITIONER

## 2021-12-21 RX ORDER — OLANZAPINE 5 MG/1
5 TABLET ORAL NIGHTLY
Qty: 30 TABLET | Refills: 1 | Status: SHIPPED | OUTPATIENT
Start: 2021-12-21 | End: 2022-02-15 | Stop reason: SDUPTHER

## 2021-12-21 RX ORDER — AMITRIPTYLINE HYDROCHLORIDE 25 MG/1
25 TABLET, FILM COATED ORAL
Qty: 30 TABLET | Refills: 1 | Status: SHIPPED | OUTPATIENT
Start: 2021-12-21 | End: 2022-02-15 | Stop reason: SDUPTHER

## 2021-12-21 RX ORDER — AMITRIPTYLINE HYDROCHLORIDE 100 MG/1
100 TABLET, FILM COATED ORAL NIGHTLY
Qty: 30 TABLET | Refills: 1 | Status: SHIPPED | OUTPATIENT
Start: 2021-12-21 | End: 2022-02-15 | Stop reason: SDUPTHER

## 2021-12-21 NOTE — PROGRESS NOTES
Subjective   Alfonso Barnett is a 51 y.o. male is here today for medication management follow-up.    Chief Complaint:  Schizophrenia     History of Present Illness:    Patient presents today for follow up for medication management for schizophrenia. Patient states he had a good thanksgiving. Patient states he has been going to [employer] and still dating the girlfriend. Patient states he has been doing really good. Patient states he felt better than he ever has. Patient states he stopped the abilify and started the zyprexa back. Patient states he has been taking zyprexa 5mg and denies any side effects. Denies any mood swings or irritability. Patient reports currently depression is at a 5-6 on a 0-10 scale with 10 being the worst. Patient reports symptoms of depression of having times with lower mood. Patient reports anxiety is at a 5 on a 0-10 scale with 10 being the worst. Patient denies any panic attacks. Patient reports sleeping 6-7 hours a night and patient denies any nightmares. Patient reports appetite is good and eating 2-3 meals a day. Patient states he has lost about 12lbs. Patient states paranoia has been doing a lot better. Patient states waking up feeling really good. Patient denies any auditory or visual hallucinations. Patient adamantly denies any SI or HI. Patient denies any side effects to medications. Patient denies any medical changes since last visit.   The following portions of the patient's history were reviewed and updated as appropriate: allergies, current medications, past family history, past medical history, past social history, past surgical history and problem list.    Review of Systems   Constitutional: Negative.    Respiratory: Negative.    Cardiovascular: Negative.    Gastrointestinal: Negative.    Neurological: Negative.    Psychiatric/Behavioral: Positive for dysphoric mood. The patient is nervous/anxious.        Objective   Physical Exam  Vitals reviewed.   Constitutional:        Appearance: Normal appearance. He is well-developed and well-groomed.   Neurological:      Mental Status: He is alert.   Psychiatric:         Attention and Perception: Attention and perception normal.         Mood and Affect: Mood normal. Affect is blunt.         Speech: Speech normal.         Behavior: Behavior normal. Behavior is cooperative.         Thought Content: Thought content normal.         Cognition and Memory: Cognition and memory normal.         Judgment: Judgment normal.       Blood pressure 128/70, pulse 80, weight 97.5 kg (215 lb). Body mass index is 30.85 kg/m².    No Known Allergies    Medication List:   Current Outpatient Medications   Medication Sig Dispense Refill   • amitriptyline (ELAVIL) 100 MG tablet Take 1 tablet by mouth Every Night. 30 tablet 1   • amitriptyline (ELAVIL) 25 MG tablet Take 1 tablet by mouth every night at bedtime. 30 tablet 1   • aspirin  MG tablet Take 1 tablet by mouth Daily. 30 tablet 5   • atenolol (TENORMIN) 25 MG tablet Take 0.5 tablets by mouth 2 (Two) Times a Day. 30 tablet 5   • chlorhexidine (PERIDEX) 0.12 % solution      • Cyanocobalamin (Vitamin B12) 1000 MCG tablet controlled-release Take 1 tablet by mouth Daily. 30 tablet 5   • lisinopril (PRINIVIL,ZESTRIL) 10 MG tablet Take 1 tablet by mouth Daily. 30 tablet 5   • OLANZapine (ZyPREXA) 5 MG tablet Take 1 tablet by mouth Every Night. 30 tablet 1   • simvastatin (ZOCOR) 40 MG tablet Take 1 tablet by mouth Every Night. 30 tablet 5   • vitamin D (ERGOCALCIFEROL) 1.25 MG (40758 UT) capsule capsule Take 1 capsule by mouth 1 (One) Time Per Week. 4 capsule 5     No current facility-administered medications for this visit.       Mental Status Exam:   Hygiene:   good  Cooperation:  Cooperative  Eye Contact:  Good  Psychomotor Behavior:  Slow  Affect:  Blunted  Hopelessness: Denies  Speech:  Normal  Thought Process:  Goal directed and Linear  Thought Content:  Normal  Suicidal:  None  Homicidal:   None  Hallucinations:  None  Delusion:  None  Memory:  Intact  Orientation:  Person, Place, Time and Situation  Reliability:  fair  Insight:  Fair  Judgement:  Fair  Impulse Control:  Fair  Physical/Medical Issues:  No               Assessment/Plan   Diagnoses and all orders for this visit:    1. Paranoid schizophrenia (HCC) (Primary)  -     OLANZapine (ZyPREXA) 5 MG tablet; Take 1 tablet by mouth Every Night.  Dispense: 30 tablet; Refill: 1    2. Anxiety disorder due to known physiological condition  -     amitriptyline (ELAVIL) 100 MG tablet; Take 1 tablet by mouth Every Night.  Dispense: 30 tablet; Refill: 1  -     amitriptyline (ELAVIL) 25 MG tablet; Take 1 tablet by mouth every night at bedtime.  Dispense: 30 tablet; Refill: 1            Discussed medication options with patient. Stop Abilify. Restart Zyprexa 5mg daily for schizophrenia. Cont. Elavil 100mg daily at night  for anxiety and insomnia. Cont. Elavil 25mg daily at night for anxiety and insomnia. Reviewed the risks, benefits, and side effects of the medications; patient acknowledged and verbally consented. Patient was instructed on medication side effects, benefits, and also of no treatment.  Patient was given an explanation regarding potential for increased risk of diabetes, lipids, and weight gain.  Labs will be assessed as clinically indicated.  Diet was discussed especially healthy diet choices and increasing activity and exercise.  Patient was strongly urged to continue weight maintance or weight loss efforts.  Patient reported verbalized understanding of instructions.   Patient is agreeable to call the office with any questions, concerns, or worsening of symptoms.  Patient is aware to call 911 or go to the nearest ER should begin having SI/HI.          Follow up in eight weeks      Errors in dictation may reflect use of voice recognition software and not all errors in transcription may have been detected prior to signing.               This  document has been electronically signed by ADOLFO Barbour   December 21, 2021 10:33 EST

## 2022-02-15 ENCOUNTER — OFFICE VISIT (OUTPATIENT)
Dept: PSYCHIATRY | Facility: CLINIC | Age: 52
End: 2022-02-15

## 2022-02-15 VITALS
BODY MASS INDEX: 31.57 KG/M2 | DIASTOLIC BLOOD PRESSURE: 82 MMHG | WEIGHT: 220 LBS | SYSTOLIC BLOOD PRESSURE: 130 MMHG | HEART RATE: 87 BPM

## 2022-02-15 DIAGNOSIS — F06.4 ANXIETY DISORDER DUE TO KNOWN PHYSIOLOGICAL CONDITION: ICD-10-CM

## 2022-02-15 DIAGNOSIS — F20.0 PARANOID SCHIZOPHRENIA: ICD-10-CM

## 2022-02-15 PROCEDURE — 99213 OFFICE O/P EST LOW 20 MIN: CPT | Performed by: NURSE PRACTITIONER

## 2022-02-15 RX ORDER — AMITRIPTYLINE HYDROCHLORIDE 25 MG/1
25 TABLET, FILM COATED ORAL
Qty: 30 TABLET | Refills: 1 | Status: SHIPPED | OUTPATIENT
Start: 2022-02-15 | End: 2022-04-05 | Stop reason: SDUPTHER

## 2022-02-15 RX ORDER — AMITRIPTYLINE HYDROCHLORIDE 100 MG/1
100 TABLET, FILM COATED ORAL NIGHTLY
Qty: 30 TABLET | Refills: 1 | Status: SHIPPED | OUTPATIENT
Start: 2022-02-15 | End: 2022-04-05 | Stop reason: SDUPTHER

## 2022-02-15 RX ORDER — OLANZAPINE 5 MG/1
5 TABLET ORAL NIGHTLY
Qty: 30 TABLET | Refills: 1 | Status: SHIPPED | OUTPATIENT
Start: 2022-02-15 | End: 2022-04-05 | Stop reason: SDUPTHER

## 2022-02-15 NOTE — PROGRESS NOTES
Subjective   Alfonso Barnett is a 51 y.o. male is here today for medication management follow-up.    Chief Complaint:  Schizophrenia     History of Present Illness:  Patient presents today for follow up for medication management for schizophrenia. Patient states everything is going good. Patient states he has been doing pretty good and still dating the same woman. Patient states going to Judaism lately with girlfriend. Patient states still taking medicine. Patient states paranoia has been doing better. Patient denies any depression or sadness. Patient reports anxiety is at a 5 on a 0-10 scale with 10 being the worst. Patient denies any panic attacks. Patient reports sleeping at least 6 hours a night and patient denies any nightmares. Patient reports appetite is good and eating at least two meals a day. Patient denies any auditory or visual hallucinations. Patient adamantly denies any SI or HI. Patient denies any side effects to medications. Patient denies any medical changes since last visit.   The following portions of the patient's history were reviewed and updated as appropriate: allergies, current medications, past family history, past medical history, past social history, past surgical history and problem list.    Review of Systems   Constitutional: Negative.    Respiratory: Negative.    Cardiovascular: Negative.    Gastrointestinal: Negative.    Neurological: Negative.    Psychiatric/Behavioral: The patient is nervous/anxious.        Objective   Physical Exam  Vitals reviewed.   Constitutional:       Appearance: Normal appearance. He is well-developed and well-groomed.   Neurological:      Mental Status: He is alert.   Psychiatric:         Attention and Perception: Attention and perception normal.         Mood and Affect: Affect normal. Mood is anxious.         Speech: Speech normal.         Behavior: Behavior normal. Behavior is cooperative.         Thought Content: Thought content normal.         Cognition  and Memory: Cognition and memory normal.         Judgment: Judgment normal.       Blood pressure 130/82, pulse 87, weight 99.8 kg (220 lb). Body mass index is 31.57 kg/m².    No Known Allergies    Medication List:   Current Outpatient Medications   Medication Sig Dispense Refill   • amitriptyline (ELAVIL) 100 MG tablet Take 1 tablet by mouth Every Night. 30 tablet 1   • amitriptyline (ELAVIL) 25 MG tablet Take 1 tablet by mouth every night at bedtime. 30 tablet 1   • aspirin  MG tablet Take 1 tablet by mouth Daily. 30 tablet 5   • atenolol (TENORMIN) 25 MG tablet Take 0.5 tablets by mouth 2 (Two) Times a Day. 30 tablet 5   • chlorhexidine (PERIDEX) 0.12 % solution      • Cyanocobalamin (Vitamin B12) 1000 MCG tablet controlled-release Take 1 tablet by mouth Daily. 30 tablet 5   • lisinopril (PRINIVIL,ZESTRIL) 10 MG tablet Take 1 tablet by mouth Daily. 30 tablet 5   • OLANZapine (ZyPREXA) 5 MG tablet Take 1 tablet by mouth Every Night. 30 tablet 1   • simvastatin (ZOCOR) 40 MG tablet Take 1 tablet by mouth Every Night. 30 tablet 5   • vitamin D (ERGOCALCIFEROL) 1.25 MG (08449 UT) capsule capsule Take 1 capsule by mouth 1 (One) Time Per Week. 4 capsule 5     No current facility-administered medications for this visit.       Mental Status Exam:   Hygiene:   good  Cooperation:  Cooperative  Eye Contact:  Good  Psychomotor Behavior:  Appropriate  Affect:  Appropriate  Hopelessness: Denies  Speech:  Normal  Thought Process:  Goal directed and Linear  Thought Content:  Normal  Suicidal:  None  Homicidal:  None  Hallucinations:  None  Delusion:  None  Memory:  Intact  Orientation:  Person, Place, Time and Situation  Reliability:  fair  Insight:  Fair  Judgement:  Fair  Impulse Control:  Fair  Physical/Medical Issues:  No                 Assessment/Plan   Diagnoses and all orders for this visit:    1. Anxiety disorder due to known physiological condition  -     amitriptyline (ELAVIL) 100 MG tablet; Take 1 tablet by  mouth Every Night.  Dispense: 30 tablet; Refill: 1  -     amitriptyline (ELAVIL) 25 MG tablet; Take 1 tablet by mouth every night at bedtime.  Dispense: 30 tablet; Refill: 1    2. Paranoid schizophrenia (HCC)  -     OLANZapine (ZyPREXA) 5 MG tablet; Take 1 tablet by mouth Every Night.  Dispense: 30 tablet; Refill: 1            Discussed medication options with patient. Cont. Elavil 125mg daily at night for anxiety and insomnia. Cont. Zyprexa 5mg daily at night for schizophrenia. Reviewed the risks, benefits, and side effects of the medications; patient acknowledged and verbally consented. Patient was instructed on medication side effects, benefits, and also of no treatment.  Patient was given an explanation regarding potential for increased risk of diabetes, lipids, and weight gain.  Labs will be assessed as clinically indicated.  Diet was discussed especially healthy diet choices and increasing activity and exercise.  Patient was strongly urged to continue weight maintance or weight loss efforts.  Patient reported verbalized understanding of instructions. Patient is agreeable to call the office with any questions, concerns, or worsening of symptoms. Patient is aware to call 911 or go to the nearest ER should begin having SI/HI.        Follow up in eight weeks        Errors in dictation may reflect use of voice recognition software and not all errors in transcription may have been detected prior to signing.              This document has been electronically signed by ADOLFO Barbour   February 15, 2022 12:33 EST

## 2022-03-12 NOTE — PROGRESS NOTES
Subjective   Alfonso Barnett is a 48 y.o. male.     Chief Complaint   Patient presents with   • needs labs       History of Present Illness:    Patient is recently been exposed to hepatitis B.  His wife has recently been diagnosed.  Patient denies any history of substance abuse or needle use.  He does report that his wife prior to marriage did use IV drugs.  He has been feeling fatigued lately and would like to be screened.    Other diagnoses include type 2 diabetes which is controlled with diet and lifestyle, hyperlipidemia which is stable with Zocor, anxiety disorder which is currently stable, hypertension which is stable with lisinopril, paranoid schizophrenia for which she is treated by comprehensive care.  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        The following portions of the patient's history were reviewed and updated as appropriate:  Allergies, current medications, past family history, past medical history, past social history, past surgical history and problem list.    Review of Systems   Constitutional: Positive for fatigue. Negative for appetite change and unexpected weight change.   HENT: Negative for congestion, ear pain, nosebleeds, postnasal drip, rhinorrhea, sore throat, trouble swallowing and voice change.    Eyes: Negative for pain and visual disturbance.   Respiratory: Negative for cough, shortness of breath and wheezing.    Cardiovascular: Negative for chest pain and palpitations.   Gastrointestinal: Negative for abdominal pain, blood in stool, constipation and diarrhea.   Endocrine: Negative for cold intolerance and polydipsia.   Genitourinary: Negative for difficulty urinating, flank pain and hematuria.   Musculoskeletal: Negative for arthralgias, back pain, gait problem, joint swelling and myalgias.   Skin: Negative for color change and rash.   Allergic/Immunologic: Negative.    Neurological: Negative for syncope, numbness and headaches.   Hematological: Negative.   "  Psychiatric/Behavioral: Negative for dysphoric mood, self-injury, sleep disturbance and suicidal ideas. The patient is not nervous/anxious.    All other systems reviewed and are negative.      Objective     /70   Pulse 98   Temp 97.9 °F (36.6 °C) (Tympanic)   Ht 177.8 cm (70\")   Wt 105 kg (231 lb)   SpO2 97%   BMI 33.15 kg/m²   Office Visit on 11/08/2018   Component Date Value Ref Range Status   • Glucose 11/08/2018 99  70 - 110 mg/dL Final   • BUN 11/08/2018 9  7 - 21 mg/dL Final   • Creatinine 11/08/2018 0.90  0.43 - 1.29 mg/dL Final   • Sodium 11/08/2018 138  135 - 153 mmol/L Final   • Potassium 11/08/2018 4.1  3.5 - 5.3 mmol/L Final   • Chloride 11/08/2018 105  99 - 112 mmol/L Final   • CO2 11/08/2018 28.0  24.3 - 31.9 mmol/L Final   • Calcium 11/08/2018 9.5  7.7 - 10.0 mg/dL Final   • Total Protein 11/08/2018 7.5  6.0 - 8.0 g/dL Final   • Albumin 11/08/2018 5.00  3.50 - 5.00 g/dL Final   • ALT (SGPT) 11/08/2018 30  10 - 44 U/L Final   • AST (SGOT) 11/08/2018 24  10 - 34 U/L Final   • Alkaline Phosphatase 11/08/2018 88  40 - 129 U/L Final   • Total Bilirubin 11/08/2018 0.3  0.2 - 1.8 mg/dL Final   • eGFR Non  Amer 11/08/2018 89  >60 mL/min/1.73 Final   • Globulin 11/08/2018 2.5  gm/dL Final   • A/G Ratio 11/08/2018 2.0  1.5 - 2.5 g/dL Final   • BUN/Creatinine Ratio 11/08/2018 10.0  7.0 - 25.0 Final   • Anion Gap 11/08/2018 5.0  3.6 - 11.2 mmol/L Final   • TSH 11/08/2018 1.158  0.550 - 4.780 mIU/mL Final   • Hemoglobin A1C 11/08/2018 5.40  4.50 - 5.70 % Final   • 25 Hydroxy, Vitamin D 11/08/2018 37.0  ng/ml Final   • Total Cholesterol 11/08/2018 169  0 - 200 mg/dL Final   • Triglycerides 11/08/2018 82  0 - 150 mg/dL Final   • HDL Cholesterol 11/08/2018 52* 60 - 100 mg/dL Final   • LDL Cholesterol  11/08/2018 101* 0 - 100 mg/dL Final   • VLDL Cholesterol 11/08/2018 16.4  mg/dL Final   • LDL/HDL Ratio 11/08/2018 1.93   Final   • PSA 11/08/2018 1.1  0.0 - 4.0 ng/mL Final   • PSA, Free 11/08/2018 " 0.17  N/A ng/mL Final   • PSA, Free % 11/08/2018 15.5  % Final   • WBC 11/08/2018 6.50  4.50 - 12.50 10*3/mm3 Final   • RBC 11/08/2018 5.26  4.70 - 6.10 10*6/mm3 Final   • Hemoglobin 11/08/2018 14.8  14.0 - 18.0 g/dL Final   • Hematocrit 11/08/2018 43.5  42.0 - 52.0 % Final   • MCV 11/08/2018 82.7  80.0 - 94.0 fL Final   • MCH 11/08/2018 28.1  27.0 - 33.0 pg Final   • MCHC 11/08/2018 34.0  33.0 - 37.0 g/dL Final   • RDW 11/08/2018 12.7  11.5 - 14.5 % Final   • RDW-SD 11/08/2018 38.0  37.0 - 54.0 fl Final   • MPV 11/08/2018 9.6  6.0 - 10.0 fL Final   • Platelets 11/08/2018 311  130 - 400 10*3/mm3 Final   • Neutrophil % 11/08/2018 63.4  30.0 - 70.0 % Final   • Lymphocyte % 11/08/2018 25.5  21.0 - 51.0 % Final   • Monocyte % 11/08/2018 7.1  0.0 - 10.0 % Final   • Eosinophil % 11/08/2018 3.2  0.0 - 5.0 % Final   • Basophil % 11/08/2018 0.6  0.0 - 2.0 % Final   • Immature Grans % 11/08/2018 0.2  0.0 - 0.5 % Final   • Neutrophils, Absolute 11/08/2018 4.12  1.40 - 6.50 10*3/mm3 Final   • Lymphocytes, Absolute 11/08/2018 1.66  1.00 - 3.00 10*3/mm3 Final   • Monocytes, Absolute 11/08/2018 0.46  0.10 - 0.90 10*3/mm3 Final   • Eosinophils, Absolute 11/08/2018 0.21  0.00 - 0.70 10*3/mm3 Final   • Basophils, Absolute 11/08/2018 0.04  0.00 - 0.30 10*3/mm3 Final   • Immature Grans, Absolute 11/08/2018 0.01  0.00 - 0.03 10*3/mm3 Final   • Testosterone, Total 11/08/2018 383  264 - 916 ng/dL Final   • Testosterone, Free 11/08/2018   pg/mL Final   • Osmolality Calc 11/08/2018 274.4  273.0 - 305.0 mOsm/kg Final       Physical Exam   Constitutional: He is oriented to person, place, and time. Vital signs are normal. He appears well-developed and well-nourished. No distress.   Talkative and friendly 48-year-old male   HENT:   Head: Normocephalic.   Right Ear: External ear normal.   Left Ear: External ear normal.   Nose: Nose normal.   Mouth/Throat: Oropharynx is clear and moist. No oropharyngeal exudate.   Eyes: Conjunctivae, EOM and lids  are normal. Pupils are equal, round, and reactive to light. Right eye exhibits no discharge. Left eye exhibits no discharge. Right conjunctiva is not injected. Right conjunctiva has no hemorrhage. Left conjunctiva is not injected. Left conjunctiva has no hemorrhage.   Neck: Normal range of motion. Neck supple. No tracheal deviation present. No thyromegaly present.   Cardiovascular: Normal rate, regular rhythm and normal heart sounds. Exam reveals no gallop and no friction rub.   No murmur heard.  Pulmonary/Chest: Effort normal and breath sounds normal. No respiratory distress. He has no wheezes. He has no rales. He exhibits no tenderness.   Abdominal: Soft. Normal appearance and bowel sounds are normal. He exhibits no distension and no mass. There is no tenderness. There is no rebound and no guarding.   Musculoskeletal: Normal range of motion.   Lymphadenopathy:     He has no cervical adenopathy.   Neurological: He is alert and oriented to person, place, and time. He has normal reflexes.   CN 2-12 grossly intact    Skin: Skin is warm and dry. Capillary refill takes less than 2 seconds. No lesion and no rash noted. He is not diaphoretic. No cyanosis or erythema. Nails show no clubbing.   Psychiatric: He has a normal mood and affect. His speech is normal and behavior is normal. Judgment and thought content normal. Cognition and memory are normal.   Vitals reviewed.      Assessment/Plan     Problem List Items Addressed This Visit        Cardiovascular and Mediastinum    Hyperlipidemia    Relevant Orders    Comprehensive Metabolic Panel    Lipid Panel    TSH    CBC & Differential    Hemoglobin A1c    Vitamin D 25 Hydroxy    CBC Auto Differential       Digestive    Vitamin D deficiency disease    Relevant Orders    Comprehensive Metabolic Panel    Lipid Panel    TSH    CBC & Differential    Hemoglobin A1c    Vitamin D 25 Hydroxy    CBC Auto Differential       Endocrine    Type 2 diabetes mellitus with hyperglycemia,  without long-term current use of insulin (CMS/HCC)    Relevant Orders    Comprehensive Metabolic Panel    Lipid Panel    TSH    CBC & Differential    Hemoglobin A1c    Vitamin D 25 Hydroxy    CBC Auto Differential       Other    High risk heterosexual behavior    Overview     Wife has hep B          Relevant Orders    Comprehensive Metabolic Panel    Lipid Panel    TSH    CBC & Differential    Hemoglobin A1c    Vitamin D 25 Hydroxy    CBC Auto Differential      Other Visit Diagnoses     Exposure to hepatitis B    -  Primary    Relevant Orders    Hepatitis panel, acute    Hemoglobin A1c    Vitamin D 25 Hydroxy    Chronic fatigue         Relevant Orders    Hepatitis panel, acute          Fasting labs today including a hepatitis panel.    Infection control and disease prevention discussed.     Patient's Body mass index is 33.15 kg/m². BMI is above normal parameters. Recommendations include: exercise counseling and nutrition counseling.      I have discussed diagnosis in detail today allowing time for questions and answers. Patient is aware of reasons to seek urgent or emergent medical care as well as reasons to return to the clinic for evaluation. Possible side effects, interactions and progression of symptoms discussed as well. Patient / family states understanding.   Emotional support and active listening provided.     Follow Up in 1 week to review labs, sooner if needed.        This document has been electronically signed by:  ADOLFO Valero, NP-C   No

## 2022-03-15 ENCOUNTER — OFFICE VISIT (OUTPATIENT)
Dept: FAMILY MEDICINE CLINIC | Facility: CLINIC | Age: 52
End: 2022-03-15

## 2022-03-15 VITALS
WEIGHT: 218.4 LBS | SYSTOLIC BLOOD PRESSURE: 122 MMHG | BODY MASS INDEX: 31.26 KG/M2 | OXYGEN SATURATION: 98 % | HEIGHT: 70 IN | HEART RATE: 98 BPM | TEMPERATURE: 98.1 F | DIASTOLIC BLOOD PRESSURE: 86 MMHG

## 2022-03-15 DIAGNOSIS — E53.8 VITAMIN B 12 DEFICIENCY: ICD-10-CM

## 2022-03-15 DIAGNOSIS — I10 ESSENTIAL HYPERTENSION: Primary | ICD-10-CM

## 2022-03-15 DIAGNOSIS — E78.2 MIXED HYPERLIPIDEMIA: ICD-10-CM

## 2022-03-15 DIAGNOSIS — E55.9 VITAMIN D DEFICIENCY DISEASE: ICD-10-CM

## 2022-03-15 DIAGNOSIS — E11.9 TYPE 2 DIABETES MELLITUS WITHOUT COMPLICATION, WITHOUT LONG-TERM CURRENT USE OF INSULIN: ICD-10-CM

## 2022-03-15 PROBLEM — L29.9 ITCHING WITH IRRITATION: Status: RESOLVED | Noted: 2019-11-05 | Resolved: 2022-03-15

## 2022-03-15 PROCEDURE — 80053 COMPREHEN METABOLIC PANEL: CPT | Performed by: NURSE PRACTITIONER

## 2022-03-15 PROCEDURE — 82043 UR ALBUMIN QUANTITATIVE: CPT | Performed by: NURSE PRACTITIONER

## 2022-03-15 PROCEDURE — 80061 LIPID PANEL: CPT | Performed by: NURSE PRACTITIONER

## 2022-03-15 PROCEDURE — 84439 ASSAY OF FREE THYROXINE: CPT | Performed by: NURSE PRACTITIONER

## 2022-03-15 PROCEDURE — 83036 HEMOGLOBIN GLYCOSYLATED A1C: CPT | Performed by: NURSE PRACTITIONER

## 2022-03-15 PROCEDURE — 84443 ASSAY THYROID STIM HORMONE: CPT | Performed by: NURSE PRACTITIONER

## 2022-03-15 PROCEDURE — 99214 OFFICE O/P EST MOD 30 MIN: CPT | Performed by: NURSE PRACTITIONER

## 2022-03-15 PROCEDURE — 82306 VITAMIN D 25 HYDROXY: CPT | Performed by: NURSE PRACTITIONER

## 2022-03-15 PROCEDURE — 85025 COMPLETE CBC W/AUTO DIFF WBC: CPT | Performed by: NURSE PRACTITIONER

## 2022-03-15 RX ORDER — SIMVASTATIN 40 MG
40 TABLET ORAL NIGHTLY
Qty: 30 TABLET | Refills: 5 | Status: SHIPPED | OUTPATIENT
Start: 2022-03-15 | End: 2022-10-19 | Stop reason: SDUPTHER

## 2022-03-15 RX ORDER — ERGOCALCIFEROL 1.25 MG/1
50000 CAPSULE ORAL WEEKLY
Qty: 4 CAPSULE | Refills: 5 | Status: SHIPPED | OUTPATIENT
Start: 2022-03-15 | End: 2022-10-19 | Stop reason: SDUPTHER

## 2022-03-15 RX ORDER — ATENOLOL 25 MG/1
12.5 TABLET ORAL 2 TIMES DAILY
Qty: 30 TABLET | Refills: 5 | Status: SHIPPED | OUTPATIENT
Start: 2022-03-15 | End: 2022-07-19 | Stop reason: SDUPTHER

## 2022-03-15 RX ORDER — LISINOPRIL 10 MG/1
10 TABLET ORAL DAILY
Qty: 30 TABLET | Refills: 5 | Status: SHIPPED | OUTPATIENT
Start: 2022-03-15 | End: 2022-06-14

## 2022-03-15 NOTE — PROGRESS NOTES
"Subjective   Alfonso Barnett is a 51 y.o. male.     Chief Complaint   Patient presents with   • Hypertension       History of Present Illness     Hyperlipidemia-chronic and ongoing.  Patient is currently taking simvastatin 40 mg.  No negative side effects.  Patient denies any negative side effects of cholesterol medication.  No reported myalgia or myopathies.  Hypertension-chronic and ongoing.  Patient is currently taking lisinopril 10 mg and Atenolol 25 mg 0.5 mg BID.  No negative side effects.  He is taking as directed.  Mental health-patient is under the care of psych NP.  He is currently on Elavil 125 mg and zyprexa 5 mg.    He reports he is doing good.   He does feel he has some energy and feels more \"alert and awake\".  He is sleeping well.    He is tolerating medication changes well.   [id number] that he has not received his B12 supplement from the pharmacy.  He would like a new RX sent.   Blood sugars-reports that he is not checking his sugars.  He is not on meds.  He does try to watch his sugar intake.       The following portions of the patient's history were reviewed and updated as appropriate: CC, ROS, allergies, current medications, past family history, past medical history, past social history, past surgical history and problem list.    Review of Systems   Constitutional: Positive for fatigue. Negative for appetite change, unexpected weight gain and unexpected weight loss.   HENT: Negative for congestion, ear pain, postnasal drip, rhinorrhea, sore throat, swollen glands, trouble swallowing and voice change.    Eyes: Negative for pain and visual disturbance.   Respiratory: Negative for cough, chest tightness, shortness of breath and wheezing.    Cardiovascular: Negative for chest pain, palpitations and leg swelling.   Gastrointestinal: Negative for abdominal pain, blood in stool, constipation, diarrhea, nausea and indigestion.   Genitourinary: Negative for dysuria, hematuria and urgency.   Musculoskeletal: " "Negative for arthralgias, back pain, gait problem, joint swelling and myalgias.   Skin: Negative for color change and skin lesions.   Allergic/Immunologic: Negative.    Neurological: Negative for numbness, headache and confusion.   Hematological: Negative.    Psychiatric/Behavioral: Positive for stress. Negative for dysphoric mood, sleep disturbance and suicidal ideas. The patient is nervous/anxious.    All other systems reviewed and are negative.      Objective     /86   Pulse 98   Temp 98.1 °F (36.7 °C)   Ht 177.8 cm (70\")   Wt 99.1 kg (218 lb 6.4 oz)   SpO2 98%   BMI 31.34 kg/m²     Physical Exam  Vitals reviewed.   Constitutional:       General: He is not in acute distress.     Appearance: He is well-developed. He is obese. He is not diaphoretic.   HENT:      Head: Normocephalic and atraumatic.      Jaw: No tenderness.      Comments: Oropharynx not examined.  Patient is presently wearing a face covering/mask due to COVID-19 pandemic.     Right Ear: Hearing, tympanic membrane, ear canal and external ear normal.      Left Ear: Hearing, tympanic membrane, ear canal and external ear normal.   Eyes:      General: Lids are normal. No scleral icterus.     Extraocular Movements:      Right eye: Normal extraocular motion and no nystagmus.      Left eye: Normal extraocular motion and no nystagmus.      Conjunctiva/sclera: Conjunctivae normal.      Pupils: Pupils are equal, round, and reactive to light.   Neck:      Thyroid: No thyromegaly or thyroid tenderness.      Vascular: No carotid bruit or JVD.      Trachea: No tracheal tenderness.   Cardiovascular:      Rate and Rhythm: Normal rate and regular rhythm.      Pulses:           Dorsalis pedis pulses are 2+ on the right side and 2+ on the left side.        Posterior tibial pulses are 2+ on the right side and 2+ on the left side.      Heart sounds: Normal heart sounds, S1 normal and S2 normal. No murmur heard.  Pulmonary:      Effort: Pulmonary effort is " normal. No accessory muscle usage, prolonged expiration or respiratory distress.      Breath sounds: Normal breath sounds.   Chest:      Chest wall: No tenderness.   Abdominal:      General: Bowel sounds are normal. There is no distension.      Palpations: Abdomen is soft. There is no hepatomegaly, splenomegaly or mass.      Tenderness: There is no abdominal tenderness.   Musculoskeletal:         General: No tenderness.      Cervical back: Normal range of motion and neck supple.      Right lower leg: No edema.      Left lower leg: No edema.      Comments: No muscular atrophy or flaccidity.   Lymphadenopathy:      Head:      Right side of head: No submental or submandibular adenopathy.      Left side of head: No submental or submandibular adenopathy.      Cervical: No cervical adenopathy.      Right cervical: No superficial cervical adenopathy.     Left cervical: No superficial cervical adenopathy.   Skin:     General: Skin is warm and dry.      Capillary Refill: Capillary refill takes less than 2 seconds.      Coloration: Skin is not jaundiced or pale.      Findings: No erythema.      Nails: There is no clubbing.   Neurological:      Mental Status: He is alert and oriented to person, place, and time.      Cranial Nerves: No cranial nerve deficit or facial asymmetry.      Sensory: No sensory deficit.      Motor: No weakness, tremor, atrophy or abnormal muscle tone.      Coordination: Coordination normal.      Deep Tendon Reflexes: Reflexes are normal and symmetric.   Psychiatric:         Attention and Perception: He is attentive.         Mood and Affect: Mood normal.         Speech: Speech normal.         Behavior: Behavior normal. Behavior is cooperative.         Thought Content: Thought content normal.         Judgment: Judgment normal.       Assessment/Plan     Diagnoses and all orders for this visit:    1. Essential hypertension (Primary)  Comments:  Continue lisinopril and atenolol, weight loss  recommended  Orders:  -     lisinopril (PRINIVIL,ZESTRIL) 10 MG tablet; Take 1 tablet by mouth Daily.  Dispense: 30 tablet; Refill: 5  -     atenolol (TENORMIN) 25 MG tablet; Take 0.5 tablets by mouth 2 (Two) Times a Day.  Dispense: 30 tablet; Refill: 5  -     CBC & Differential  -     Comprehensive Metabolic Panel  -     MicroAlbumin, Urine, Random - Urine, Random Void    2. Type 2 diabetes mellitus without complication, without long-term current use of insulin (HCC)  -     TSH  -     Hemoglobin A1c  -     T4, Free  -     Ambulatory Referral to Ophthalmology    3. Vitamin B 12 deficiency  -     Cyanocobalamin (Vitamin B12) 1000 MCG tablet controlled-release; Take 1 tablet by mouth Daily.  Dispense: 30 tablet; Refill: 5    4. Vitamin D deficiency disease  Comments:  Continue vitamin D, have labs obtained next week  Orders:  -     vitamin D (ERGOCALCIFEROL) 1.25 MG (76949 UT) capsule capsule; Take 1 capsule by mouth 1 (One) Time Per Week.  Dispense: 4 capsule; Refill: 5  -     Vitamin D 25 Hydroxy    5. Mixed hyperlipidemia  Comments:  Continue statin, fasting lipid panel next week  Orders:  -     simvastatin (ZOCOR) 40 MG tablet; Take 1 tablet by mouth Every Night.  Dispense: 30 tablet; Refill: 5  -     Lipid Panel       Patient's Body mass index is 31.34 kg/m². indicating that he is obese (BMI >30). Obesity-related health conditions include the following: hypertension and dyslipidemias. Obesity is unchanged. BMI is is above average. We discussed portion control and increasing exercise..     Understands disease processes and need for medications.  Understands reasons for urgent and emergent care.  Patient (& family) verbalized agreement for treatment plan.   Emotional support and active listening provided.  Patient provided time to verbalize feelings.    Labs ordered.  Will notify patient of results.      RTC 3-4 months, sooner if needed.             This document has been electronically signed by:  Aster Pa  ADOLFO, DAMIEN-C    Dragon disclaimer:  Part of this note may be an electronic transcription/translation of spoken language to printed text using the Dragon Dictation System.

## 2022-03-16 LAB
25(OH)D3 SERPL-MCNC: 46.5 NG/ML (ref 30–100)
ALBUMIN SERPL-MCNC: 4.7 G/DL (ref 3.5–5.2)
ALBUMIN UR-MCNC: <1.2 MG/DL
ALBUMIN/GLOB SERPL: 2 G/DL
ALP SERPL-CCNC: 106 U/L (ref 39–117)
ALT SERPL W P-5'-P-CCNC: 17 U/L (ref 1–41)
ANION GAP SERPL CALCULATED.3IONS-SCNC: 12 MMOL/L (ref 5–15)
AST SERPL-CCNC: 18 U/L (ref 1–40)
BASOPHILS # BLD AUTO: 0.06 10*3/MM3 (ref 0–0.2)
BASOPHILS NFR BLD AUTO: 1.1 % (ref 0–1.5)
BILIRUB SERPL-MCNC: 0.2 MG/DL (ref 0–1.2)
BUN SERPL-MCNC: 11 MG/DL (ref 6–20)
BUN/CREAT SERPL: 13.8 (ref 7–25)
CALCIUM SPEC-SCNC: 9.4 MG/DL (ref 8.6–10.5)
CHLORIDE SERPL-SCNC: 97 MMOL/L (ref 98–107)
CHOLEST SERPL-MCNC: 223 MG/DL (ref 0–200)
CO2 SERPL-SCNC: 25 MMOL/L (ref 22–29)
CREAT SERPL-MCNC: 0.8 MG/DL (ref 0.76–1.27)
DEPRECATED RDW RBC AUTO: 41.9 FL (ref 37–54)
EGFRCR SERPLBLD CKD-EPI 2021: 105.8 ML/MIN/1.73
EOSINOPHIL # BLD AUTO: 0.14 10*3/MM3 (ref 0–0.4)
EOSINOPHIL NFR BLD AUTO: 2.5 % (ref 0.3–6.2)
ERYTHROCYTE [DISTWIDTH] IN BLOOD BY AUTOMATED COUNT: 13.7 % (ref 12.3–15.4)
GLOBULIN UR ELPH-MCNC: 2.4 GM/DL
GLUCOSE SERPL-MCNC: 85 MG/DL (ref 65–99)
HBA1C MFR BLD: 5.8 % (ref 4.8–5.6)
HCT VFR BLD AUTO: 43.8 % (ref 37.5–51)
HDLC SERPL-MCNC: 53 MG/DL (ref 40–60)
HGB BLD-MCNC: 14.5 G/DL (ref 13–17.7)
IMM GRANULOCYTES # BLD AUTO: 0.02 10*3/MM3 (ref 0–0.05)
IMM GRANULOCYTES NFR BLD AUTO: 0.4 % (ref 0–0.5)
LDLC SERPL CALC-MCNC: 147 MG/DL (ref 0–100)
LDLC/HDLC SERPL: 2.72 {RATIO}
LYMPHOCYTES # BLD AUTO: 1.97 10*3/MM3 (ref 0.7–3.1)
LYMPHOCYTES NFR BLD AUTO: 35.1 % (ref 19.6–45.3)
MCH RBC QN AUTO: 27.9 PG (ref 26.6–33)
MCHC RBC AUTO-ENTMCNC: 33.1 G/DL (ref 31.5–35.7)
MCV RBC AUTO: 84.4 FL (ref 79–97)
MONOCYTES # BLD AUTO: 0.66 10*3/MM3 (ref 0.1–0.9)
MONOCYTES NFR BLD AUTO: 11.8 % (ref 5–12)
NEUTROPHILS NFR BLD AUTO: 2.76 10*3/MM3 (ref 1.7–7)
NEUTROPHILS NFR BLD AUTO: 49.1 % (ref 42.7–76)
NRBC BLD AUTO-RTO: 0 /100 WBC (ref 0–0.2)
PLATELET # BLD AUTO: 328 10*3/MM3 (ref 140–450)
PMV BLD AUTO: 8.9 FL (ref 6–12)
POTASSIUM SERPL-SCNC: 4.4 MMOL/L (ref 3.5–5.2)
PROT SERPL-MCNC: 7.1 G/DL (ref 6–8.5)
RBC # BLD AUTO: 5.19 10*6/MM3 (ref 4.14–5.8)
SODIUM SERPL-SCNC: 134 MMOL/L (ref 136–145)
T4 FREE SERPL-MCNC: 0.98 NG/DL (ref 0.93–1.7)
TRIGL SERPL-MCNC: 130 MG/DL (ref 0–150)
TSH SERPL DL<=0.05 MIU/L-ACNC: 1.52 UIU/ML (ref 0.27–4.2)
VLDLC SERPL-MCNC: 23 MG/DL (ref 5–40)
WBC NRBC COR # BLD: 5.61 10*3/MM3 (ref 3.4–10.8)

## 2022-03-25 NOTE — PROGRESS NOTES
His blood sugars are good.  His kidney function is good.  Liver function is good.  Thyroid is normal.  His cholesterol has increased back up above normal.  Encouraged him to be working harder on his diet, limit fried and fatty foods.  His A1c is 5.8 which is stable for him.

## 2022-04-05 ENCOUNTER — OFFICE VISIT (OUTPATIENT)
Dept: PSYCHIATRY | Facility: CLINIC | Age: 52
End: 2022-04-05

## 2022-04-05 VITALS
WEIGHT: 215 LBS | DIASTOLIC BLOOD PRESSURE: 70 MMHG | SYSTOLIC BLOOD PRESSURE: 124 MMHG | HEART RATE: 100 BPM | BODY MASS INDEX: 30.85 KG/M2

## 2022-04-05 DIAGNOSIS — F06.4 ANXIETY DISORDER DUE TO KNOWN PHYSIOLOGICAL CONDITION: ICD-10-CM

## 2022-04-05 DIAGNOSIS — F20.0 PARANOID SCHIZOPHRENIA: ICD-10-CM

## 2022-04-05 PROCEDURE — 99213 OFFICE O/P EST LOW 20 MIN: CPT | Performed by: NURSE PRACTITIONER

## 2022-04-05 RX ORDER — OLANZAPINE 5 MG/1
5 TABLET ORAL NIGHTLY
Qty: 30 TABLET | Refills: 1 | Status: SHIPPED | OUTPATIENT
Start: 2022-04-05 | End: 2022-04-05

## 2022-04-05 RX ORDER — AMITRIPTYLINE HYDROCHLORIDE 100 MG/1
100 TABLET, FILM COATED ORAL NIGHTLY
Qty: 30 TABLET | Refills: 1 | Status: SHIPPED | OUTPATIENT
Start: 2022-04-05 | End: 2022-05-05 | Stop reason: SDUPTHER

## 2022-04-05 RX ORDER — AMITRIPTYLINE HYDROCHLORIDE 50 MG/1
50 TABLET, FILM COATED ORAL
Qty: 30 TABLET | Refills: 1 | Status: SHIPPED | OUTPATIENT
Start: 2022-04-05 | End: 2022-05-05 | Stop reason: SDUPTHER

## 2022-04-05 RX ORDER — OLANZAPINE 10 MG/1
10 TABLET ORAL NIGHTLY
Qty: 30 TABLET | Refills: 0 | Status: SHIPPED | OUTPATIENT
Start: 2022-04-05 | End: 2022-05-05 | Stop reason: SDUPTHER

## 2022-04-05 NOTE — PROGRESS NOTES
"      Subjective   Alfonso Barnett is a 51 y.o. male is here today for medication management follow-up.    Chief Complaint:  schizophrenia    History of Present Illness:    Patient presents today for a follow up for medication management for schizophrenia. Patient states he has been doing alright and doing good spiritually. Patient states lately not been sleeping too good though. Patient states not sure if getting used to elavil. Patient states he increased the Zyprexa to 10mg due to not sleeping and paranoia. Patient states he has been taking the 100mg and the 25mg of the Elavil. Patient states sleeping about 4-5 hours a night. Patient states slept good last night but usually waking up really tired. Patient states waking up tired and not feeling like doing anything all day. Denies any nightmares. Patient states still struggling with side effects of the Zyprexa so doesn't want to increase it anymore. Patient states still dating the same woman since last August. Patient states appetite is good and eating at least two meals a day. Patient states paranoia has been \"pretty good\". Patient states still getting nervous but manageable. Denies any depression or sadness. Denies any panic attacks. Patient states anxiety is at 5-6 on a 0-10 scale with 10 being the worst. Denies any auditory or visual hallucinations. Denies any thought to harm self or others. Patient reports medication compliance and denies any side effects.   The following portions of the patient's history were reviewed and updated as appropriate: allergies, current medications, past family history, past medical history, past social history, past surgical history and problem list.    Review of Systems   Constitutional: Negative.    Respiratory: Negative.    Cardiovascular: Negative.    Gastrointestinal: Negative.    Neurological: Negative.    Psychiatric/Behavioral: Positive for sleep disturbance. The patient is nervous/anxious.        Objective   Physical " Exam  Vitals reviewed.   Constitutional:       Appearance: Normal appearance. He is well-developed and well-groomed.   Neurological:      Mental Status: He is alert.   Psychiatric:         Attention and Perception: Attention and perception normal.         Mood and Affect: Affect normal. Mood is anxious.         Speech: Speech normal.         Behavior: Behavior normal. Behavior is cooperative.         Thought Content: Thought content normal.         Cognition and Memory: Cognition and memory normal.         Judgment: Judgment normal.       Blood pressure 124/70, pulse 100, weight 97.5 kg (215 lb). Body mass index is 30.85 kg/m².    No Known Allergies    Medication List:   Current Outpatient Medications   Medication Sig Dispense Refill   • amitriptyline (ELAVIL) 100 MG tablet Take 1 tablet by mouth Every Night. 30 tablet 1   • amitriptyline (ELAVIL) 50 MG tablet Take 1 tablet by mouth every night at bedtime. 30 tablet 1   • OLANZapine (ZyPREXA) 10 MG tablet Take 1 tablet by mouth Every Night. 30 tablet 0   • atenolol (TENORMIN) 25 MG tablet Take 0.5 tablets by mouth 2 (Two) Times a Day. 30 tablet 5   • chlorhexidine (PERIDEX) 0.12 % solution      • Cyanocobalamin (Vitamin B12) 1000 MCG tablet controlled-release Take 1 tablet by mouth Daily. 30 tablet 5   • lisinopril (PRINIVIL,ZESTRIL) 10 MG tablet Take 1 tablet by mouth Daily. 30 tablet 5   • simvastatin (ZOCOR) 40 MG tablet Take 1 tablet by mouth Every Night. 30 tablet 5   • vitamin D (ERGOCALCIFEROL) 1.25 MG (28389 UT) capsule capsule Take 1 capsule by mouth 1 (One) Time Per Week. 4 capsule 5     No current facility-administered medications for this visit.       Mental Status Exam:   Hygiene:   good  Cooperation:  Cooperative  Eye Contact:  Good  Psychomotor Behavior:  Appropriate  Affect:  Appropriate  Hopelessness: Denies  Speech:  Normal  Thought Process:  Goal directed and Linear  Thought Content:  Normal  Suicidal:  None  Homicidal:  None  Hallucinations:   None  Delusion:  None  Memory:  Intact  Orientation:  Person, Place, Time and Situation  Reliability:  fair  Insight:  Fair  Judgement:  Fair  Impulse Control:  Fair  Physical/Medical Issues:  No               Assessment/Plan   Diagnoses and all orders for this visit:    1. Anxiety disorder due to known physiological condition  -     amitriptyline (ELAVIL) 100 MG tablet; Take 1 tablet by mouth Every Night.  Dispense: 30 tablet; Refill: 1  -     amitriptyline (ELAVIL) 50 MG tablet; Take 1 tablet by mouth every night at bedtime.  Dispense: 30 tablet; Refill: 1    2. Paranoid schizophrenia (HCC)  -     Discontinue: OLANZapine (ZyPREXA) 5 MG tablet; Take 1 tablet by mouth Every Night.  Dispense: 30 tablet; Refill: 1  -     OLANZapine (ZyPREXA) 10 MG tablet; Take 1 tablet by mouth Every Night.  Dispense: 30 tablet; Refill: 0            Discussed medication options with patient. Increase Elavil to 150mg daily at night for worsening anxiety and insomnia. Increase Zyprexa to 10mg daily at night for schizophrenia. Reviewed the risks, benefits, and side effects of the medications; patient acknowledged and verbally consented. Patient was instructed on medication side effects, benefits, and also of no treatment.  Patient was given an explanation regarding potential for increased risk of diabetes, lipids, and weight gain.  Labs will be assessed as clinically indicated.  Diet was discussed especially healthy diet choices and increasing activity and exercise.  Patient was strongly urged to continue weight maintance or weight loss efforts.  Patient reported verbalized understanding of instructions.   Patient is agreeable to call the office with any questions, concerns, or worsening of symptoms. Patient is aware to call 911 or go to the nearest ER should begin having SI/HI.          Follow up in four weeks        Errors in dictation may reflect use of voice recognition software and not all errors in transcription may have been  detected prior to signing.              This document has been electronically signed by ADOLFO Barbour   April 5, 2022 11:55 EDT

## 2022-05-05 DIAGNOSIS — F06.4 ANXIETY DISORDER DUE TO KNOWN PHYSIOLOGICAL CONDITION: ICD-10-CM

## 2022-05-05 DIAGNOSIS — F20.0 PARANOID SCHIZOPHRENIA: ICD-10-CM

## 2022-05-05 RX ORDER — AMITRIPTYLINE HYDROCHLORIDE 100 MG/1
100 TABLET, FILM COATED ORAL NIGHTLY
Qty: 30 TABLET | Refills: 1 | Status: SHIPPED | OUTPATIENT
Start: 2022-05-05 | End: 2022-06-14 | Stop reason: SDUPTHER

## 2022-05-05 RX ORDER — OLANZAPINE 10 MG/1
10 TABLET ORAL NIGHTLY
Qty: 30 TABLET | Refills: 0 | Status: SHIPPED | OUTPATIENT
Start: 2022-05-05 | End: 2022-06-14 | Stop reason: SDUPTHER

## 2022-05-05 RX ORDER — AMITRIPTYLINE HYDROCHLORIDE 50 MG/1
50 TABLET, FILM COATED ORAL
Qty: 30 TABLET | Refills: 1 | Status: SHIPPED | OUTPATIENT
Start: 2022-05-05 | End: 2022-06-14 | Stop reason: SDUPTHER

## 2022-06-14 DIAGNOSIS — F06.4 ANXIETY DISORDER DUE TO KNOWN PHYSIOLOGICAL CONDITION: ICD-10-CM

## 2022-06-14 DIAGNOSIS — I10 ESSENTIAL HYPERTENSION: ICD-10-CM

## 2022-06-14 DIAGNOSIS — F20.0 PARANOID SCHIZOPHRENIA: ICD-10-CM

## 2022-06-14 RX ORDER — AMITRIPTYLINE HYDROCHLORIDE 50 MG/1
50 TABLET, FILM COATED ORAL
Qty: 30 TABLET | Refills: 1 | Status: SHIPPED | OUTPATIENT
Start: 2022-06-14 | End: 2022-07-25 | Stop reason: SDUPTHER

## 2022-06-14 RX ORDER — LISINOPRIL 10 MG/1
10 TABLET ORAL DAILY
Qty: 30 TABLET | Refills: 5 | Status: SHIPPED | OUTPATIENT
Start: 2022-06-14 | End: 2022-12-06 | Stop reason: SDUPTHER

## 2022-06-14 RX ORDER — AMITRIPTYLINE HYDROCHLORIDE 100 MG/1
100 TABLET, FILM COATED ORAL NIGHTLY
Qty: 30 TABLET | Refills: 1 | Status: SHIPPED | OUTPATIENT
Start: 2022-06-14 | End: 2022-07-25 | Stop reason: SDUPTHER

## 2022-06-14 RX ORDER — OLANZAPINE 10 MG/1
10 TABLET ORAL NIGHTLY
Qty: 30 TABLET | Refills: 0 | Status: SHIPPED | OUTPATIENT
Start: 2022-06-14 | End: 2022-07-25

## 2022-07-19 ENCOUNTER — OFFICE VISIT (OUTPATIENT)
Dept: FAMILY MEDICINE CLINIC | Facility: CLINIC | Age: 52
End: 2022-07-19

## 2022-07-19 VITALS
SYSTOLIC BLOOD PRESSURE: 124 MMHG | BODY MASS INDEX: 32.21 KG/M2 | DIASTOLIC BLOOD PRESSURE: 80 MMHG | OXYGEN SATURATION: 98 % | WEIGHT: 225 LBS | HEART RATE: 104 BPM | HEIGHT: 70 IN | TEMPERATURE: 98.2 F | RESPIRATION RATE: 20 BRPM

## 2022-07-19 DIAGNOSIS — F51.01 PRIMARY INSOMNIA: ICD-10-CM

## 2022-07-19 DIAGNOSIS — I10 ESSENTIAL HYPERTENSION: Primary | ICD-10-CM

## 2022-07-19 DIAGNOSIS — E53.8 VITAMIN B 12 DEFICIENCY: ICD-10-CM

## 2022-07-19 DIAGNOSIS — F20.0 PARANOID SCHIZOPHRENIA: ICD-10-CM

## 2022-07-19 DIAGNOSIS — E78.2 MIXED HYPERLIPIDEMIA: ICD-10-CM

## 2022-07-19 DIAGNOSIS — E55.9 VITAMIN D DEFICIENCY DISEASE: ICD-10-CM

## 2022-07-19 DIAGNOSIS — S22.31XA CLOSED FRACTURE OF ONE RIB OF RIGHT SIDE, INITIAL ENCOUNTER: ICD-10-CM

## 2022-07-19 PROCEDURE — 99214 OFFICE O/P EST MOD 30 MIN: CPT | Performed by: NURSE PRACTITIONER

## 2022-07-19 RX ORDER — ATENOLOL 25 MG/1
25 TABLET ORAL DAILY
Qty: 30 TABLET | Refills: 5 | Status: SHIPPED | OUTPATIENT
Start: 2022-07-19 | End: 2023-01-19 | Stop reason: SDUPTHER

## 2022-07-19 NOTE — PROGRESS NOTES
Subjective   Alfonso Barnett is a 52 y.o. male.     Chief Complaint   Patient presents with   • ER FOLLOW UP       History of Present Illness     ER follow up (From July 12, 2022)-reports he had a fall onto his porch and thought he was just reyna up.  He reports that he went to the hospital for evaluation.  He was advised he had a rib fracture.  He reports that he feels the area is not getting any better.  He reports pain with deep breath.   He is not having cough or SOA.   He reports that the pain is sharp.  His Xray recommends a repeat xray in 10-14 days    HTN-On Atenolol 25 mg daily and Lisinopril 10 mg.  He is taking at night.  He denies any negative side effects.  No associated symptoms such as CP, SOA, HA, or dizziness.  Insomnia-on Elavil 150 mg.  He reports he is doing good.  He is under the care of psych NP.  He is also on zyprexa 10 mg.  He has a history of schizophrenia.  He plans to make an updated appointment for his mental health.  Vitamin D deficiency-chronic and ongoing.  Patient is presently taking vitamin D 50,000 units supplement.  No negative side effects of medication are reported.  Vitamin D level is stable with medication use.  Vitamin B12 deficiency-chronic and ongoing.  Patient is presently taking vitamin B12 supplement.  Patient is tolerating well and denies any negative side effects.  Hyperlipidemia-chronic and ongoing.  Patient is currently taking simvastatin 40 mg.  No negative side effects.  Patient denies any negative side effects of cholesterol medication.  No reported myalgia or myopathies.    The following portions of the patient's history were reviewed and updated as appropriate: CC, ROS, allergies, current medications, past family history, past medical history, past social history, past surgical history and problem list.      Review of Systems   Constitutional: Positive for fatigue. Negative for appetite change, unexpected weight gain and unexpected weight loss.   HENT: Negative for  "congestion, ear pain, postnasal drip, rhinorrhea, sore throat, swollen glands, trouble swallowing and voice change.    Eyes: Negative for pain and visual disturbance.   Respiratory: Negative for cough, chest tightness, shortness of breath and wheezing.    Cardiovascular: Negative for chest pain, palpitations and leg swelling.   Gastrointestinal: Negative for abdominal pain, blood in stool, constipation, diarrhea, nausea and indigestion.   Genitourinary: Negative for dysuria, hematuria and urgency.   Musculoskeletal: Positive for arthralgias and myalgias. Negative for back pain, gait problem and joint swelling.   Skin: Negative for color change and skin lesions.   Allergic/Immunologic: Negative.    Neurological: Negative for numbness, headache and confusion.   Hematological: Negative.    Psychiatric/Behavioral: Positive for stress. Negative for dysphoric mood, sleep disturbance and suicidal ideas. The patient is not nervous/anxious.    All other systems reviewed and are negative.      Objective     /80   Pulse 104   Temp 98.2 °F (36.8 °C)   Resp 20   Ht 177.8 cm (70\")   Wt 102 kg (225 lb)   SpO2 98%   BMI 32.28 kg/m²     Physical Exam  Vitals reviewed.   Constitutional:       General: He is not in acute distress.     Appearance: He is well-developed. He is not diaphoretic.   HENT:      Head: Normocephalic and atraumatic.      Jaw: No tenderness.      Comments: Oropharynx not examined.  Patient is presently wearing a face covering/mask due to COVID-19 pandemic.     Right Ear: Hearing, tympanic membrane, ear canal and external ear normal.      Left Ear: Hearing, tympanic membrane, ear canal and external ear normal.   Eyes:      General: Lids are normal. No scleral icterus.     Extraocular Movements:      Right eye: Normal extraocular motion and no nystagmus.      Left eye: Normal extraocular motion and no nystagmus.      Conjunctiva/sclera: Conjunctivae normal.      Pupils: Pupils are equal, round, and " reactive to light.   Neck:      Thyroid: No thyromegaly or thyroid tenderness.      Vascular: No carotid bruit or JVD.      Trachea: No tracheal tenderness.   Cardiovascular:      Rate and Rhythm: Normal rate and regular rhythm.      Pulses:           Dorsalis pedis pulses are 2+ on the right side and 2+ on the left side.        Posterior tibial pulses are 2+ on the right side and 2+ on the left side.      Heart sounds: Normal heart sounds, S1 normal and S2 normal. No murmur heard.  Pulmonary:      Effort: Pulmonary effort is normal. No accessory muscle usage, prolonged expiration or respiratory distress.      Breath sounds: Normal breath sounds.   Chest:      Chest wall: No tenderness.      Comments: Rib and costal space pain noted under right breast and into axillary region.  No ecchymosis or edema noted.  Abdominal:      General: Bowel sounds are normal. There is no distension.      Palpations: Abdomen is soft. There is no mass.      Tenderness: There is no abdominal tenderness.   Musculoskeletal:         General: No tenderness.      Cervical back: Normal range of motion and neck supple.      Right lower leg: No edema.      Left lower leg: No edema.      Comments: No muscular atrophy or flaccidity.   Lymphadenopathy:      Head:      Right side of head: No submental or submandibular adenopathy.      Left side of head: No submental or submandibular adenopathy.      Cervical: No cervical adenopathy.      Right cervical: No superficial cervical adenopathy.     Left cervical: No superficial cervical adenopathy.   Skin:     General: Skin is warm and dry.      Capillary Refill: Capillary refill takes less than 2 seconds.      Coloration: Skin is not jaundiced or pale.      Findings: No erythema.      Nails: There is no clubbing.   Neurological:      Mental Status: He is alert and oriented to person, place, and time.      Cranial Nerves: No cranial nerve deficit or facial asymmetry.      Sensory: No sensory deficit.       Motor: No weakness, tremor, atrophy or abnormal muscle tone.      Coordination: Coordination normal.      Deep Tendon Reflexes: Reflexes are normal and symmetric.   Psychiatric:         Attention and Perception: He is attentive.         Mood and Affect: Mood normal.         Speech: Speech normal.         Behavior: Behavior normal. Behavior is cooperative.         Thought Content: Thought content normal.         Judgment: Judgment normal.           Diagnoses and all orders for this visit:    1. Essential hypertension (Primary)  Comments:  Continue lisinopril and atenolol, weight loss recommended  Orders:  -     atenolol (TENORMIN) 25 MG tablet; Take 1 tablet by mouth Daily.  Dispense: 30 tablet; Refill: 5    2. Vitamin B 12 deficiency  Comments:  We will plan for an updated B12 level.  Patient to continue his supplement  Orders:  -     Cyanocobalamin (Vitamin B12) 1000 MCG tablet controlled-release; Take 1 tablet by mouth Daily.  Dispense: 30 tablet; Refill: 5    3. Closed fracture of one rib of right side, initial encounter  Comments:  Reviewed hospital records including most recent rib x-ray.  Repeat imaging ordered   Orders:  -     XR Ribs 2 View Right; Future    4. Mixed hyperlipidemia  Assessment & Plan:  Continue simvastatin 40 mg.  We will plan for an updated cholesterol level.  Continue to work on a low-cholesterol diet      5. Vitamin D deficiency disease  Assessment & Plan:  Continue vitamin D 50,000 units weekly.  We will plan for an updated vitamin D level      6. Paranoid schizophrenia (HCC)  Assessment & Plan:  Continue Elavil as directed.  Continue Zyprexa as directed..      7. Primary insomnia  Assessment & Plan:  Continue Elavil as directed         Understands disease processes and need for medications.  Understands reasons for urgent and emergent care.  Patient (& family) verbalized agreement for treatment plan.   Emotional support and active listening provided.  Patient provided time to verbalize  feelings.    We will plan for updated fasting labs.  Hospital records reviewed.  Discussed any specific concerns patient had regarding testing, labs, Etc.   Current outpatient and discharge medications have been reconciled for the patient.  Reviewed by: ADOLFO Magdaleno    RTC 3 months, sooner if needed for problems or concerns          This document has been electronically signed by:  ADOLFO Magdaleno, DAMIEN-C    Dragon disclaimer:  Part of this note may be an electronic transcription/translation of spoken language to printed text using the Dragon Dictation System.

## 2022-07-20 ENCOUNTER — TELEPHONE (OUTPATIENT)
Dept: FAMILY MEDICINE CLINIC | Facility: CLINIC | Age: 52
End: 2022-07-20

## 2022-07-20 NOTE — TELEPHONE ENCOUNTER
Caller: Alfonso Barnett    Relationship: Self    Best call back number: [phone]    What is the best time to reach you: ANY    Who are you requesting to speak with (clinical staff, provider,  specific staff member): GRANT CYR    Do you know the name of the person who called: ALFONSO    What was the call regarding: PATIENT WOULD LIKE TO KNOW IF GRANT HAS HAD TIME TO REVIEW HIS X-RAYS FROM VISIT ON 7/19/2022.    Do you require a callback: YES

## 2022-07-25 ENCOUNTER — OFFICE VISIT (OUTPATIENT)
Dept: PSYCHIATRY | Facility: CLINIC | Age: 52
End: 2022-07-25

## 2022-07-25 VITALS
SYSTOLIC BLOOD PRESSURE: 130 MMHG | DIASTOLIC BLOOD PRESSURE: 76 MMHG | WEIGHT: 222.8 LBS | BODY MASS INDEX: 31.97 KG/M2 | HEART RATE: 85 BPM

## 2022-07-25 DIAGNOSIS — F20.0 PARANOID SCHIZOPHRENIA: Primary | ICD-10-CM

## 2022-07-25 DIAGNOSIS — F06.4 ANXIETY DISORDER DUE TO KNOWN PHYSIOLOGICAL CONDITION: ICD-10-CM

## 2022-07-25 PROCEDURE — 99213 OFFICE O/P EST LOW 20 MIN: CPT | Performed by: NURSE PRACTITIONER

## 2022-07-25 RX ORDER — OLANZAPINE 5 MG/1
5 TABLET ORAL NIGHTLY
Qty: 30 TABLET | Refills: 2 | Status: SHIPPED | OUTPATIENT
Start: 2022-07-25 | End: 2022-09-29 | Stop reason: SDUPTHER

## 2022-07-25 RX ORDER — OLANZAPINE 5 MG/1
TABLET ORAL
COMMUNITY
Start: 2022-07-21 | End: 2022-07-25 | Stop reason: SDUPTHER

## 2022-07-25 RX ORDER — HYDROCODONE BITARTRATE AND ACETAMINOPHEN 5; 325 MG/1; MG/1
TABLET ORAL
COMMUNITY
Start: 2022-07-13 | End: 2022-10-19

## 2022-07-25 RX ORDER — AMITRIPTYLINE HYDROCHLORIDE 100 MG/1
100 TABLET, FILM COATED ORAL NIGHTLY
Qty: 30 TABLET | Refills: 1 | Status: SHIPPED | OUTPATIENT
Start: 2022-07-25 | End: 2022-09-29 | Stop reason: SDUPTHER

## 2022-07-25 RX ORDER — AMITRIPTYLINE HYDROCHLORIDE 50 MG/1
50 TABLET, FILM COATED ORAL
Qty: 30 TABLET | Refills: 1 | Status: SHIPPED | OUTPATIENT
Start: 2022-07-25 | End: 2022-09-29 | Stop reason: SDUPTHER

## 2022-07-25 RX ORDER — IBUPROFEN 600 MG/1
TABLET ORAL
COMMUNITY
Start: 2022-07-13 | End: 2022-09-07

## 2022-07-28 NOTE — ASSESSMENT & PLAN NOTE
Continue simvastatin 40 mg.  We will plan for an updated cholesterol level.  Continue to work on a low-cholesterol diet

## 2022-09-07 ENCOUNTER — OFFICE VISIT (OUTPATIENT)
Dept: FAMILY MEDICINE CLINIC | Facility: CLINIC | Age: 52
End: 2022-09-07

## 2022-09-07 VITALS
BODY MASS INDEX: 31.78 KG/M2 | OXYGEN SATURATION: 98 % | DIASTOLIC BLOOD PRESSURE: 80 MMHG | HEIGHT: 70 IN | HEART RATE: 85 BPM | SYSTOLIC BLOOD PRESSURE: 120 MMHG | TEMPERATURE: 96.8 F | WEIGHT: 222 LBS

## 2022-09-07 DIAGNOSIS — E55.9 VITAMIN D DEFICIENCY: ICD-10-CM

## 2022-09-07 DIAGNOSIS — I10 ESSENTIAL HYPERTENSION: ICD-10-CM

## 2022-09-07 DIAGNOSIS — E78.2 MIXED HYPERLIPIDEMIA: ICD-10-CM

## 2022-09-07 DIAGNOSIS — E53.8 VITAMIN B12 DEFICIENCY: ICD-10-CM

## 2022-09-07 DIAGNOSIS — Z00.00 HEALTH MAINTENANCE EXAMINATION: ICD-10-CM

## 2022-09-07 DIAGNOSIS — S22.31XD CLOSED FRACTURE OF ONE RIB OF RIGHT SIDE WITH ROUTINE HEALING, SUBSEQUENT ENCOUNTER: Primary | ICD-10-CM

## 2022-09-07 DIAGNOSIS — Z12.5 PROSTATE CANCER SCREENING: ICD-10-CM

## 2022-09-07 PROCEDURE — 99214 OFFICE O/P EST MOD 30 MIN: CPT | Performed by: PHYSICIAN ASSISTANT

## 2022-09-07 RX ORDER — IBUPROFEN 600 MG/1
600 TABLET ORAL 3 TIMES DAILY PRN
Qty: 90 TABLET | Refills: 1 | Status: SHIPPED | OUTPATIENT
Start: 2022-09-07

## 2022-09-07 NOTE — PATIENT INSTRUCTIONS
BMI for Adults  Body mass index (BMI) is a number that is calculated from a person's weight and height. In most adults, the number is used to find how much of an adult's weight is made up of fat. BMI is not as accurate as a direct measure of body fat.  HOW IS BMI CALCULATED?  BMI is calculated by dividing weight in kilograms by height in meters squared. It can also be calculated by dividing weight in pounds by height in inches squared, then multiplying the resulting number by 703. Charts are available to help you find your BMI quickly and easily without doing this calculation.   HOW IS BMI INTERPRETED?  Health care professionals use BMI charts to identify whether an adult is underweight, at a normal weight, or overweight based on the following guidelines:  Underweight: BMI less than 18.5.  Normal weight: BMI between 18.5 and 24.9.  Overweight: BMI between 25 and 29.9.  Obese: BMI of 30 and above.  BMI is usually interpreted the same for males and females.  Weight includes both fat and muscle, so someone with a muscular build, such as an athlete, may have a BMI that is higher than 24.9. In cases like these, BMI may not accurately depict body fat. To determine if excess body fat is the cause of a BMI of 25 or higher, further assessments may need to be done by a health care provider.  WHY IS BMI A USEFUL TOOL?  BMI is used to identify a possible weight problem that may be related to a medical problem or may increase the risk for medical problems. BMI can also be used to promote changes to reach a healthy weight.     This information is not intended to replace advice given to you by your health care provider. Make sure you discuss any questions you have with your health care provider.     Document Released: 08/29/2005 Document Revised: 01/08/2016 Document Reviewed: 05/15/2015  [employer] Interactive Patient Education ©2017 [employer]       Calorie Counting for Weight Loss  Calories are energy you get from the things  you eat and drink. Your body uses this energy to keep you going throughout the day. The number of calories you eat affects your weight. When you eat more calories than your body needs, your body stores the extra calories as fat. When you eat fewer calories than your body needs, your body burns fat to get the energy it needs.  Calorie counting means keeping track of how many calories you eat and drink each day. If you make sure to eat fewer calories than your body needs, you should lose weight. In order for calorie counting to work, you will need to eat the number of calories that are right for you in a day to lose a healthy amount of weight per week. A healthy amount of weight to lose per week is usually 1-2 lb (0.5-0.9 kg). A dietitian can determine how many calories you need in a day and give you suggestions on how to reach your calorie goal.   WHAT IS MY MY PLAN?  My goal is to have __________ calories per day.   If I have this many calories per day, I should lose around __________ pounds per week.  WHAT DO I NEED TO KNOW ABOUT CALORIE COUNTING?  In order to meet your daily calorie goal, you will need to:  Find out how many calories are in each food you would like to eat. Try to do this before you eat.  Decide how much of the food you can eat.  Write down what you ate and how many calories it had. Doing this is called keeping a food log.  WHERE DO I FIND CALORIE INFORMATION?  The number of calories in a food can be found on a Nutrition Facts label. Note that all the information on a label is based on a specific serving of the food. If a food does not have a Nutrition Facts label, try to look up the calories online or ask your dietitian for help.  HOW DO I DECIDE HOW MUCH TO EAT?  To decide how much of the food you can eat, you will need to consider both the number of calories in one serving and the size of one serving. This information can be found on the Nutrition Facts label. If a food does not have a Nutrition  Facts label, look up the information online or ask your dietitian for help.  Remember that calories are listed per serving. If you choose to have more than one serving of a food, you will have to multiply the calories per serving by the amount of servings you plan to eat. For example, the label on a package of bread might say that a serving size is 1 slice and that there are 90 calories in a serving. If you eat 1 slice, you will have eaten 90 calories. If you eat 2 slices, you will have eaten 180 calories.  HOW DO I KEEP A FOOD LOG?  After each meal, record the following information in your food log:  What you ate.  How much of it you ate.  How many calories it had.  Then, add up your calories.  Keep your food log near you, such as in a small notebook in your pocket. Another option is to use a mobile andie or website. Some programs will calculate calories for you and show you how many calories you have left each time you add an item to the log.  WHAT ARE SOME CALORIE COUNTING TIPS?  Use your calories on foods and drinks that will fill you up and not leave you hungry. Some examples of this include foods like nuts and nut butters, vegetables, lean proteins, and high-fiber foods (more than 5 g fiber per serving).  Eat nutritious foods and avoid empty calories. Empty calories are calories you get from foods or beverages that do not have many nutrients, such as candy and soda. It is better to have a nutritious high-calorie food (such as an avocado) than a food with few nutrients (such as a bag of chips).  Know how many calories are in the foods you eat most often. This way, you do not have to look up how many calories they have each time you eat them.  Look out for foods that may seem like low-calorie foods but are really high-calorie foods, such as baked goods, soda, and fat-free candy.  Pay attention to calories in drinks. Drinks such as sodas, specialty coffee drinks, alcohol, and juices have a lot of calories yet do  not fill you up. Choose low-calorie drinks like water and diet drinks.  Focus your calorie counting efforts on higher calorie items. Logging the calories in a garden salad that contains only vegetables is less important than calculating the calories in a milk shake.  Find a way of tracking calories that works for you. Get creative. Most people who are successful find ways to keep track of how much they eat in a day, even if they do not count every calorie.  WHAT ARE SOME PORTION CONTROL TIPS?  Know how many calories are in a serving. This will help you know how many servings of a certain food you can have.  Use a measuring cup to measure serving sizes. This is helpful when you start out. With time, you will be able to estimate serving sizes for some foods.  Take some time to put servings of different foods on your favorite plates, bowls, and cups so you know what a serving looks like.  Try not to eat straight from a bag or box. Doing this can lead to overeating. Put the amount you would like to eat in a cup or on a plate to make sure you are eating the right portion.  Use smaller plates, glasses, and bowls to prevent overeating. This is a quick and easy way to practice portion control. If your plate is smaller, less food can fit on it.  Try not to multitask while eating, such as watching TV or using your computer. If it is time to eat, sit down at a table and enjoy your food. Doing this will help you to start recognizing when you are full. It will also make you more aware of what and how much you are eating.  HOW CAN I CALORIE COUNT WHEN EATING OUT?  Ask for smaller portion sizes or child-sized portions.  Consider sharing an entree and sides instead of getting your own entree.  If you get your own entree, eat only half. Ask for a box at the beginning of your meal and put the rest of your entree in it so you are not tempted to eat it.  Look for the calories on the menu. If calories are listed, choose the lower  "calorie options.  Choose dishes that include vegetables, fruits, whole grains, low-fat dairy products, and lean protein. Focusing on smart food choices from each of the 5 food groups can help you stay on track at restaurants.  Choose items that are boiled, broiled, grilled, or steamed.  Choose water, milk, unsweetened iced tea, or other drinks without added sugars. If you want an alcoholic beverage, choose a lower calorie option. For example, a regular yon can have up to 700 calories and a glass of wine has around 150.  Stay away from items that are buttered, battered, fried, or served with cream sauce. Items labeled \"crispy\" are usually fried, unless stated otherwise.  Ask for dressings, sauces, and syrups on the side. These are usually very high in calories, so do not eat much of them.  Watch out for salads. Many people think salads are a healthy option, but this is often not the case. Many salads come with ford, fried chicken, lots of cheese, fried chips, and dressing. All of these items have a lot of calories. If you want a salad, choose a garden salad and ask for grilled meats or steak. Ask for the dressing on the side, or ask for olive oil and vinegar or lemon to use as dressing.  Estimate how many servings of a food you are given. For example, a serving of cooked rice is ½ cup or about the size of half a tennis ball or one cupcake wrapper. Knowing serving sizes will help you be aware of how much food you are eating at restaurants. The list below tells you how big or small some common portion sizes are based on everyday objects.  1 oz--4 stacked dice.  3 oz--1 deck of cards.  1 tsp--1 dice.  1 Tbsp--½ a Ping-Pong ball.  2 Tbsp--1 Ping-Pong ball.  ½ cup--1 tennis ball or 1 cupcake wrapper.  1 cup--1 baseball.     This information is not intended to replace advice given to you by your health care provider. Make sure you discuss any questions you have with your health care provider.     Document Released: " 12/18/2006 Document Revised: 01/08/2016 Document Reviewed: 10/23/2014  [employer] Interactive Patient Education ©2017 [employer]
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
## 2022-09-29 ENCOUNTER — OFFICE VISIT (OUTPATIENT)
Dept: PSYCHIATRY | Facility: CLINIC | Age: 52
End: 2022-09-29

## 2022-09-29 VITALS
HEART RATE: 98 BPM | DIASTOLIC BLOOD PRESSURE: 70 MMHG | WEIGHT: 225 LBS | SYSTOLIC BLOOD PRESSURE: 132 MMHG | BODY MASS INDEX: 32.28 KG/M2

## 2022-09-29 DIAGNOSIS — F20.0 PARANOID SCHIZOPHRENIA: ICD-10-CM

## 2022-09-29 DIAGNOSIS — F06.4 ANXIETY DISORDER DUE TO KNOWN PHYSIOLOGICAL CONDITION: ICD-10-CM

## 2022-09-29 PROCEDURE — 99213 OFFICE O/P EST LOW 20 MIN: CPT | Performed by: NURSE PRACTITIONER

## 2022-09-29 RX ORDER — AMITRIPTYLINE HYDROCHLORIDE 100 MG/1
100 TABLET, FILM COATED ORAL NIGHTLY
Qty: 30 TABLET | Refills: 1 | Status: SHIPPED | OUTPATIENT
Start: 2022-09-29 | End: 2022-11-29 | Stop reason: SDUPTHER

## 2022-09-29 RX ORDER — AMITRIPTYLINE HYDROCHLORIDE 50 MG/1
50 TABLET, FILM COATED ORAL
Qty: 30 TABLET | Refills: 1 | Status: SHIPPED | OUTPATIENT
Start: 2022-09-29 | End: 2022-11-29 | Stop reason: SDUPTHER

## 2022-09-29 RX ORDER — OLANZAPINE 5 MG/1
5 TABLET ORAL NIGHTLY
Qty: 30 TABLET | Refills: 2 | Status: SHIPPED | OUTPATIENT
Start: 2022-09-29 | End: 2022-11-29 | Stop reason: SDUPTHER

## 2022-09-29 NOTE — PROGRESS NOTES
Subjective   Alfonso Barnett is a 52 y.o. male is here today for medication management follow-up.    Chief Complaint:  schizophrenia    History of Present Illness:    Patient presents today for a follow up for medication management for schizophrenia. Patient states doing pretty good and sleeping alright. Patient states taking medicine about 9 pm so able to go to sleep. Patient states sleeping about 5-6 hours. Denies any nightmares. Patient states side effects are still doing ok. Patient states appetite is good and eating about 1-2 times a day. Patient states like to snack at night before going to bed. Denies any panic attacks. Patient states having some paranoia and feel a little nervous today. Patient states the last few days had some stuff happen to him and making him more nervous.  Patient states had a mishap that has him a little worried but doing ok for the most part. Patient states been real nervous this past week. Patient states was doing really good before this week. Patient states anxiety is at a 6-7 on a 0-10 scale with 10 being the worst. Denies any depression or sadness. Patient states had some mood swings and irritability. Denies any aggression. Denies any thoughts to harm self or others. Denies any auditory or visual hallucinations. Denies any medical changes since last visit.   The following portions of the patient's history were reviewed and updated as appropriate: allergies, current medications, past family history, past medical history, past social history, past surgical history and problem list.    Review of Systems   Constitutional: Negative.    Respiratory: Negative.    Cardiovascular: Negative.    Gastrointestinal: Negative.    Neurological: Negative.    Psychiatric/Behavioral: The patient is nervous/anxious.        Objective   Physical Exam  Vitals reviewed.   Constitutional:       Appearance: Normal appearance. He is well-developed and well-groomed.   Neurological:      Mental Status: He is  alert.   Psychiatric:         Attention and Perception: Attention and perception normal.         Mood and Affect: Mood is anxious. Affect is blunt.         Speech: Speech normal.         Behavior: Behavior normal. Behavior is cooperative.         Thought Content: Thought content normal.         Cognition and Memory: Cognition and memory normal.         Judgment: Judgment normal.       Blood pressure 132/70, pulse 98, weight 102 kg (225 lb). Body mass index is 32.28 kg/m².    No Known Allergies    Medication List:   Current Outpatient Medications   Medication Sig Dispense Refill   • amitriptyline (ELAVIL) 100 MG tablet Take 1 tablet by mouth Every Night. 30 tablet 1   • amitriptyline (ELAVIL) 50 MG tablet Take 1 tablet by mouth every night at bedtime. 30 tablet 1   • atenolol (TENORMIN) 25 MG tablet Take 1 tablet by mouth Daily. 30 tablet 5   • chlorhexidine (PERIDEX) 0.12 % solution      • Cyanocobalamin (Vitamin B12) 1000 MCG tablet controlled-release Take 1 tablet by mouth Daily. 30 tablet 5   • HYDROcodone-acetaminophen (NORCO) 5-325 MG per tablet      • ibuprofen (ADVIL,MOTRIN) 600 MG tablet Take 1 tablet by mouth 3 (Three) Times a Day As Needed for Mild Pain or Moderate Pain. 90 tablet 1   • lisinopril (PRINIVIL,ZESTRIL) 10 MG tablet TAKE 1 TABLET BY MOUTH DAILY. 30 tablet 5   • OLANZapine (zyPREXA) 5 MG tablet Take 1 tablet by mouth Every Night. 30 tablet 2   • simvastatin (ZOCOR) 40 MG tablet Take 1 tablet by mouth Every Night. 30 tablet 5   • vitamin D (ERGOCALCIFEROL) 1.25 MG (57159 UT) capsule capsule Take 1 capsule by mouth 1 (One) Time Per Week. 4 capsule 5     No current facility-administered medications for this visit.       Mental Status Exam:   Hygiene:   good  Cooperation:  Cooperative  Eye Contact:  Good  Psychomotor Behavior:  Appropriate  Affect:  Appropriate  Hopelessness: Denies  Speech:  Normal  Thought Process:  Goal directed and Linear  Thought Content:  Normal  Suicidal:  None  Homicidal:   None  Hallucinations:  None  Delusion:  None  Memory:  Intact  Orientation:  Person, Place, Time and Situation  Reliability:  fair  Insight:  Fair  Judgement:  Fair  Impulse Control:  Fair  Physical/Medical Issues:  No                 Assessment & Plan   Diagnoses and all orders for this visit:    1. Anxiety disorder due to known physiological condition  -     amitriptyline (ELAVIL) 100 MG tablet; Take 1 tablet by mouth Every Night.  Dispense: 30 tablet; Refill: 1  -     amitriptyline (ELAVIL) 50 MG tablet; Take 1 tablet by mouth every night at bedtime.  Dispense: 30 tablet; Refill: 1    2. Paranoid schizophrenia (HCC)  -     OLANZapine (zyPREXA) 5 MG tablet; Take 1 tablet by mouth Every Night.  Dispense: 30 tablet; Refill: 2            Discussed medication options with patient. Cont. Zyprexa 5mg daily at night for schizophrenia. Cont. Amitriptyline 150mg daily at night for anxiety and insomnia. Reviewed the risks, benefits, and side effects of the medications; patient acknowledged and verbally consented. Patient was instructed on medication side effects, benefits, and also of no treatment.  Patient was given an explanation regarding potential for increased risk of diabetes, lipids, and weight gain.  Labs will be assessed as clinically indicated.  Diet was discussed especially healthy diet choices and increasing activity and exercise.  Patient was strongly urged to continue weight maintance or weight loss efforts.  Patient reported verbalized understanding of instructions.   Patient is agreeable to call the office with any questions, concerns, or worsening of symptoms. Patient is aware to call 911 or go to the nearest ER should begin having SI/HI.          Follow up in eight weeks        Errors in dictation may reflect use of voice recognition software and not all errors in transcription may have been detected prior to signing.              This document has been electronically signed by ADOLFO Barbour    September 29, 2022 10:41 EDT

## 2022-10-19 ENCOUNTER — OFFICE VISIT (OUTPATIENT)
Dept: FAMILY MEDICINE CLINIC | Facility: CLINIC | Age: 52
End: 2022-10-19

## 2022-10-19 VITALS
DIASTOLIC BLOOD PRESSURE: 70 MMHG | BODY MASS INDEX: 32.5 KG/M2 | RESPIRATION RATE: 18 BRPM | TEMPERATURE: 98.4 F | HEART RATE: 90 BPM | OXYGEN SATURATION: 98 % | SYSTOLIC BLOOD PRESSURE: 118 MMHG | HEIGHT: 70 IN | WEIGHT: 227 LBS

## 2022-10-19 DIAGNOSIS — I10 ESSENTIAL HYPERTENSION: Primary | ICD-10-CM

## 2022-10-19 DIAGNOSIS — M79.605 PAIN IN BOTH LOWER EXTREMITIES: ICD-10-CM

## 2022-10-19 DIAGNOSIS — M79.604 PAIN IN BOTH LOWER EXTREMITIES: ICD-10-CM

## 2022-10-19 DIAGNOSIS — E11.9 TYPE 2 DIABETES MELLITUS WITHOUT COMPLICATION, WITHOUT LONG-TERM CURRENT USE OF INSULIN: ICD-10-CM

## 2022-10-19 DIAGNOSIS — E53.8 VITAMIN B12 DEFICIENCY: ICD-10-CM

## 2022-10-19 DIAGNOSIS — Z12.5 PROSTATE CANCER SCREENING: ICD-10-CM

## 2022-10-19 DIAGNOSIS — E78.2 MIXED HYPERLIPIDEMIA: ICD-10-CM

## 2022-10-19 DIAGNOSIS — E55.9 VITAMIN D DEFICIENCY: ICD-10-CM

## 2022-10-19 DIAGNOSIS — I79.8 OTHER DISORDERS OF ARTERIES, ARTERIOLES AND CAPILLARIES IN DISEASES CLASSIFIED ELSEWHERE: ICD-10-CM

## 2022-10-19 PROCEDURE — 85025 COMPLETE CBC W/AUTO DIFF WBC: CPT | Performed by: PHYSICIAN ASSISTANT

## 2022-10-19 PROCEDURE — 82607 VITAMIN B-12: CPT | Performed by: PHYSICIAN ASSISTANT

## 2022-10-19 PROCEDURE — 99214 OFFICE O/P EST MOD 30 MIN: CPT | Performed by: NURSE PRACTITIONER

## 2022-10-19 PROCEDURE — 82652 VIT D 1 25-DIHYDROXY: CPT | Performed by: PHYSICIAN ASSISTANT

## 2022-10-19 PROCEDURE — G0103 PSA SCREENING: HCPCS | Performed by: PHYSICIAN ASSISTANT

## 2022-10-19 PROCEDURE — 84443 ASSAY THYROID STIM HORMONE: CPT | Performed by: PHYSICIAN ASSISTANT

## 2022-10-19 PROCEDURE — 80061 LIPID PANEL: CPT | Performed by: PHYSICIAN ASSISTANT

## 2022-10-19 PROCEDURE — 83036 HEMOGLOBIN GLYCOSYLATED A1C: CPT | Performed by: PHYSICIAN ASSISTANT

## 2022-10-19 RX ORDER — ERGOCALCIFEROL 1.25 MG/1
50000 CAPSULE ORAL WEEKLY
Qty: 4 CAPSULE | Refills: 5 | Status: SHIPPED | OUTPATIENT
Start: 2022-10-19 | End: 2023-01-19 | Stop reason: SDUPTHER

## 2022-10-19 RX ORDER — SIMVASTATIN 40 MG
40 TABLET ORAL NIGHTLY
Qty: 30 TABLET | Refills: 5 | Status: SHIPPED | OUTPATIENT
Start: 2022-10-19 | End: 2023-01-19 | Stop reason: SDUPTHER

## 2022-10-19 NOTE — PROGRESS NOTES
"Subjective   Alfonso Barnett is a 52 y.o. male.     Chief Complaint   Patient presents with   • Hypertension       History of Present Illness      HTN-ongoing.  He is taking atenolol 25 mg and lisinopril 10 mg.  No negative side effects.  His blood pressure is stable today.  Vitamin D deficiency-chronic and ongoing.  Patient is presently taking vitamin D 50,000 units supplement.  No negative side effects of medication are reported.  Vitamin D level is stable with medication use.  Hyperlipidemia-ongoing.  Patient is currently taking simvastatin 40 mg.  No negative side effects.  Patient denies any negative side effects of cholesterol medication.  No reported myalgia or myopathies.  Schizoaffective disorder-patient is currently on Zyprexa 5 mg and Elavil 150 mg per psych NP.  He reports that his symptoms are doing well overall.  He reports he is sleeping well.  He is not having any difficulty getting up in the AM as he gets his grandchildren up for school.   Vitamin B12 deficiency-chronic and ongoing.  Patient is has not been taking vitamin B12 supplement.  He reports that he has his medication put away and he forgets to get it out.  He has been more fatigued.   Rib pain-reports he has had improvement over his rib area.   He reports that he had pain for several months but it is \"alright now\".    Leg concern-reports a person from his insurance came out to his home and reported to him that he had some blockages in his legs.  He reports that she had a machine that she tested him with.  He would like to have further work as \"that is scary and concerning\".  He reports that he is not having any significant leg concerns.     The following portions of the patient's history were reviewed and updated as appropriate: CC, ROS, allergies, current medications, past family history, past medical history, past social history, past surgical history and problem list.      Review of Systems   Constitutional: Positive for fatigue. Negative " "for activity change, appetite change and fever.   HENT: Negative for congestion, ear pain, facial swelling, nosebleeds, rhinorrhea, sinus pressure, sore throat and trouble swallowing.    Eyes: Negative for blurred vision, double vision and redness.   Respiratory: Negative for cough, chest tightness, shortness of breath and wheezing.    Cardiovascular: Negative for chest pain, palpitations and leg swelling.   Gastrointestinal: Negative for abdominal pain, blood in stool, constipation, diarrhea, vomiting and indigestion.   Endocrine: Negative.    Genitourinary: Negative for dysuria, flank pain, frequency, hematuria and urgency.   Musculoskeletal: Positive for arthralgias and myalgias.   Skin: Negative.    Allergic/Immunologic: Negative.    Neurological: Negative for dizziness, weakness, light-headedness and headache.   Hematological: Negative.    Psychiatric/Behavioral: Positive for stress. Negative for decreased concentration, dysphoric mood, self-injury, sleep disturbance and suicidal ideas. The patient is not nervous/anxious.    All other systems reviewed and are negative.      Objective     /70   Pulse 90   Temp 98.4 °F (36.9 °C)   Resp 18   Ht 177.8 cm (70\")   Wt 103 kg (227 lb)   SpO2 98%   BMI 32.57 kg/m²     Physical Exam  Vitals reviewed.   Constitutional:       General: He is not in acute distress.     Appearance: He is well-developed. He is not diaphoretic.   HENT:      Head: Normocephalic and atraumatic.      Jaw: No tenderness.      Comments: Oropharynx not examined.  Patient is presently wearing a face covering/mask due to COVID-19 pandemic.     Right Ear: Hearing, tympanic membrane, ear canal and external ear normal.      Left Ear: Hearing, tympanic membrane, ear canal and external ear normal.   Eyes:      General: Lids are normal. No scleral icterus.     Extraocular Movements:      Right eye: Normal extraocular motion and no nystagmus.      Left eye: Normal extraocular motion and no " nystagmus.      Conjunctiva/sclera: Conjunctivae normal.      Pupils: Pupils are equal, round, and reactive to light.   Neck:      Thyroid: No thyromegaly or thyroid tenderness.      Vascular: No carotid bruit or JVD.      Trachea: No tracheal tenderness.   Cardiovascular:      Rate and Rhythm: Normal rate and regular rhythm.      Pulses:           Dorsalis pedis pulses are 2+ on the right side and 2+ on the left side.        Posterior tibial pulses are 2+ on the right side and 2+ on the left side.      Heart sounds: Normal heart sounds, S1 normal and S2 normal. No murmur heard.  Pulmonary:      Effort: Pulmonary effort is normal. No accessory muscle usage, prolonged expiration or respiratory distress.      Breath sounds: Normal breath sounds.   Chest:      Chest wall: No tenderness.   Abdominal:      General: Bowel sounds are normal. There is no distension.      Palpations: Abdomen is soft. There is no mass.      Tenderness: There is no abdominal tenderness.   Musculoskeletal:         General: Tenderness present.      Cervical back: Normal range of motion and neck supple.      Right lower leg: No edema.      Left lower leg: No edema.      Comments: No muscular atrophy or flaccidity.   Lymphadenopathy:      Head:      Right side of head: No submental or submandibular adenopathy.      Left side of head: No submental or submandibular adenopathy.      Cervical: No cervical adenopathy.      Right cervical: No superficial cervical adenopathy.     Left cervical: No superficial cervical adenopathy.   Skin:     General: Skin is warm and dry.      Capillary Refill: Capillary refill takes less than 2 seconds.      Coloration: Skin is not jaundiced or pale.      Findings: No erythema.      Nails: There is no clubbing.   Neurological:      Mental Status: He is alert and oriented to person, place, and time.      Cranial Nerves: No cranial nerve deficit or facial asymmetry.      Sensory: No sensory deficit.      Motor: No  weakness, tremor, atrophy or abnormal muscle tone.      Coordination: Coordination normal.      Gait: Gait abnormal (mild antalgia).      Deep Tendon Reflexes: Reflexes are normal and symmetric.   Psychiatric:         Attention and Perception: He is attentive.         Mood and Affect: Mood normal.         Speech: Speech normal.         Behavior: Behavior normal. Behavior is cooperative.         Thought Content: Thought content normal.         Judgment: Judgment normal.           Diagnoses and all orders for this visit:    1. Essential hypertension (Primary)  Comments:  Continue Atenolol 25 mg and Lisinopril 10 mg.   Monitor BP at home.  Orders:  -     Comprehensive Metabolic Panel  -     Hemoglobin A1c    2. Type 2 diabetes mellitus without complication, without long-term current use of insulin (HCC)  Comments:  continue to work on diet changes.  Be active. cut down on sweets and high CHO foods.  Orders:  -     CBC & Differential  -     Comprehensive Metabolic Panel  -     Lipid Panel  -     TSH  -     Hemoglobin A1c    3. Mixed hyperlipidemia  Comments:  Continue statin  updated lipid panel ordered.  Orders:  -     simvastatin (ZOCOR) 40 MG tablet; Take 1 tablet by mouth Every Night.  Dispense: 30 tablet; Refill: 5  -     Lipid Panel  -     Hemoglobin A1c    4. Vitamin B12 deficiency  -     Hemoglobin A1c  -     Vitamin B12    5. Vitamin D deficiency  Comments:  updated Vit d level ordered today.  will resend vit D supplement to pharmacy    Orders:  -     vitamin D (ERGOCALCIFEROL) 1.25 MG (19470 UT) capsule capsule; Take 1 capsule by mouth 1 (One) Time Per Week.  Dispense: 4 capsule; Refill: 5  -     Hemoglobin A1c  -     Vitamin D 1,25 Dihydroxy    6. Prostate cancer screening  -     Hemoglobin A1c  -     PSA SCREENING    7. Pain in both lower extremities  -     US Venous Doppler Lower Extremity Bilateral (duplex); Future  -     US Arterial Doppler Lower Extremity Bilateral; Future  -     Hemoglobin A1c    8.  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  Doppler ordered  Orders:  -     US Arterial Doppler Lower Extremity Bilateral; Future  -     Hemoglobin A1c         BMI is >= 30 and <35. (Class 1 Obesity). The following options were offered after discussion;: nutrition counseling/recommendations       Understands disease processes and need for medications.  Understands reasons for urgent and emergent care.  Patient (& family) verbalized agreement for treatment plan.   Emotional support and active listening provided.  Patient provided time to verbalize feelings.    Labs today.  Will notify patient of results.     RTC 3 months, sooner if needed.             This document has been electronically signed by:  ADOLFO Magdaleno, DAMIEN-C    Dragon disclaimer:  Part of this note may be an electronic transcription/translation of spoken language to printed text using the Dragon Dictation System.

## 2022-10-20 LAB
BASOPHILS # BLD AUTO: 0.06 10*3/MM3 (ref 0–0.2)
BASOPHILS NFR BLD AUTO: 0.9 % (ref 0–1.5)
CHOLEST SERPL-MCNC: 208 MG/DL (ref 0–200)
DEPRECATED RDW RBC AUTO: 38.4 FL (ref 37–54)
EOSINOPHIL # BLD AUTO: 0.25 10*3/MM3 (ref 0–0.4)
EOSINOPHIL NFR BLD AUTO: 4 % (ref 0.3–6.2)
ERYTHROCYTE [DISTWIDTH] IN BLOOD BY AUTOMATED COUNT: 12.9 % (ref 12.3–15.4)
HBA1C MFR BLD: 5.6 % (ref 4.8–5.6)
HCT VFR BLD AUTO: 42.7 % (ref 37.5–51)
HDLC SERPL-MCNC: 50 MG/DL (ref 40–60)
HGB BLD-MCNC: 14.4 G/DL (ref 13–17.7)
IMM GRANULOCYTES # BLD AUTO: 0.01 10*3/MM3 (ref 0–0.05)
IMM GRANULOCYTES NFR BLD AUTO: 0.2 % (ref 0–0.5)
LDLC SERPL CALC-MCNC: 134 MG/DL (ref 0–100)
LDLC/HDLC SERPL: 2.63 {RATIO}
LYMPHOCYTES # BLD AUTO: 2.05 10*3/MM3 (ref 0.7–3.1)
LYMPHOCYTES NFR BLD AUTO: 32.4 % (ref 19.6–45.3)
MCH RBC QN AUTO: 28 PG (ref 26.6–33)
MCHC RBC AUTO-ENTMCNC: 33.7 G/DL (ref 31.5–35.7)
MCV RBC AUTO: 82.9 FL (ref 79–97)
MONOCYTES # BLD AUTO: 0.6 10*3/MM3 (ref 0.1–0.9)
MONOCYTES NFR BLD AUTO: 9.5 % (ref 5–12)
NEUTROPHILS NFR BLD AUTO: 3.35 10*3/MM3 (ref 1.7–7)
NEUTROPHILS NFR BLD AUTO: 53 % (ref 42.7–76)
NRBC BLD AUTO-RTO: 0 /100 WBC (ref 0–0.2)
PLATELET # BLD AUTO: 321 10*3/MM3 (ref 140–450)
PMV BLD AUTO: 9.2 FL (ref 6–12)
PSA SERPL-MCNC: 0.79 NG/ML (ref 0–4)
RBC # BLD AUTO: 5.15 10*6/MM3 (ref 4.14–5.8)
TRIGL SERPL-MCNC: 132 MG/DL (ref 0–150)
TSH SERPL DL<=0.05 MIU/L-ACNC: 1.4 UIU/ML (ref 0.27–4.2)
VIT B12 BLD-MCNC: 696 PG/ML (ref 211–946)
VLDLC SERPL-MCNC: 24 MG/DL (ref 5–40)
WBC NRBC COR # BLD: 6.32 10*3/MM3 (ref 3.4–10.8)

## 2022-10-22 LAB — 1,25(OH)2D SERPL-MCNC: 39.5 PG/ML (ref 24.8–81.5)

## 2022-10-24 ENCOUNTER — TELEPHONE (OUTPATIENT)
Dept: FAMILY MEDICINE CLINIC | Facility: CLINIC | Age: 52
End: 2022-10-24

## 2022-10-24 NOTE — TELEPHONE ENCOUNTER
----- Message from JAYLA Serrano sent at 10/24/2022  8:06 AM EDT -----  Blood counts are normal. Thyroid level is normal. B12 is normal. Hemoglobin A1c is normal. Prostate screening level is normal. Cholesterol is showing some improvement from 7 months ago.

## 2022-11-29 DIAGNOSIS — F06.4 ANXIETY DISORDER DUE TO KNOWN PHYSIOLOGICAL CONDITION: ICD-10-CM

## 2022-11-29 DIAGNOSIS — F20.0 PARANOID SCHIZOPHRENIA: ICD-10-CM

## 2022-11-29 RX ORDER — AMITRIPTYLINE HYDROCHLORIDE 50 MG/1
50 TABLET, FILM COATED ORAL
Qty: 30 TABLET | Refills: 0 | Status: SHIPPED | OUTPATIENT
Start: 2022-11-29 | End: 2023-01-10 | Stop reason: SDUPTHER

## 2022-11-29 RX ORDER — AMITRIPTYLINE HYDROCHLORIDE 100 MG/1
100 TABLET, FILM COATED ORAL NIGHTLY
Qty: 30 TABLET | Refills: 0 | Status: SHIPPED | OUTPATIENT
Start: 2022-11-29 | End: 2023-01-10 | Stop reason: SDUPTHER

## 2022-11-29 RX ORDER — OLANZAPINE 5 MG/1
5 TABLET ORAL NIGHTLY
Qty: 30 TABLET | Refills: 0 | Status: SHIPPED | OUTPATIENT
Start: 2022-11-29 | End: 2023-01-10 | Stop reason: SDUPTHER

## 2022-12-06 DIAGNOSIS — F20.0 PARANOID SCHIZOPHRENIA: ICD-10-CM

## 2022-12-06 DIAGNOSIS — I10 ESSENTIAL HYPERTENSION: ICD-10-CM

## 2022-12-06 DIAGNOSIS — F06.4 ANXIETY DISORDER DUE TO KNOWN PHYSIOLOGICAL CONDITION: ICD-10-CM

## 2022-12-06 RX ORDER — AMITRIPTYLINE HYDROCHLORIDE 100 MG/1
100 TABLET, FILM COATED ORAL NIGHTLY
Qty: 30 TABLET | Refills: 0 | Status: CANCELLED | OUTPATIENT
Start: 2022-12-06

## 2022-12-06 RX ORDER — OLANZAPINE 5 MG/1
5 TABLET ORAL NIGHTLY
Qty: 30 TABLET | Refills: 0 | Status: CANCELLED | OUTPATIENT
Start: 2022-12-06

## 2022-12-06 NOTE — TELEPHONE ENCOUNTER
Caller: Alfonso Barnett    Relationship: Self    Best call back number: [phone]  Requested Prescriptions:   Requested Prescriptions     Pending Prescriptions Disp Refills   • amitriptyline (ELAVIL) 100 MG tablet 30 tablet 0     Sig: Take 1 tablet by mouth Every Night.   • OLANZapine (zyPREXA) 5 MG tablet 30 tablet 0     Sig: Take 1 tablet by mouth Every Night.   • lisinopril (PRINIVIL,ZESTRIL) 10 MG tablet 30 tablet 5     Sig: Take 1 tablet by mouth Daily.        Pharmacy where request should be sent: [facility] [place], [place] - [address] - [phone]  - [phone]     Additional details provided by patient: PATIENT SAYS HE LOST HIS MEDICATION AND WOULD LIKE TO GET THESE REFILLED IF POSSIBLE. PLEASE CALL WITH ANY QUESTIONS OR IF NOT POSSIBLE RIGHT NOW    Does the patient have less than a 3 day supply:  [x] Yes  [] No    Would you like a call back once the refill request has been completed: [x] Yes [] No    If the office needs to give you a call back, can they leave a voicemail: [x] Yes [] No    Kal Nuñez Rep   12/06/22 11:38 EST

## 2022-12-07 RX ORDER — LISINOPRIL 10 MG/1
10 TABLET ORAL DAILY
Qty: 30 TABLET | Refills: 5 | Status: SHIPPED | OUTPATIENT
Start: 2022-12-07

## 2023-01-10 ENCOUNTER — OFFICE VISIT (OUTPATIENT)
Dept: PSYCHIATRY | Facility: CLINIC | Age: 53
End: 2023-01-10
Payer: MEDICARE

## 2023-01-10 VITALS
SYSTOLIC BLOOD PRESSURE: 120 MMHG | BODY MASS INDEX: 33 KG/M2 | HEART RATE: 80 BPM | WEIGHT: 230 LBS | DIASTOLIC BLOOD PRESSURE: 84 MMHG

## 2023-01-10 DIAGNOSIS — F20.0 PARANOID SCHIZOPHRENIA: ICD-10-CM

## 2023-01-10 DIAGNOSIS — F06.4 ANXIETY DISORDER DUE TO KNOWN PHYSIOLOGICAL CONDITION: ICD-10-CM

## 2023-01-10 PROCEDURE — 99213 OFFICE O/P EST LOW 20 MIN: CPT | Performed by: NURSE PRACTITIONER

## 2023-01-10 RX ORDER — AMITRIPTYLINE HYDROCHLORIDE 50 MG/1
50 TABLET, FILM COATED ORAL
Qty: 30 TABLET | Refills: 0 | Status: SHIPPED | OUTPATIENT
Start: 2023-01-10 | End: 2023-02-14 | Stop reason: SDUPTHER

## 2023-01-10 RX ORDER — AMITRIPTYLINE HYDROCHLORIDE 100 MG/1
100 TABLET, FILM COATED ORAL NIGHTLY
Qty: 30 TABLET | Refills: 0 | Status: SHIPPED | OUTPATIENT
Start: 2023-01-10 | End: 2023-02-14 | Stop reason: SDUPTHER

## 2023-01-10 RX ORDER — OLANZAPINE 5 MG/1
5 TABLET ORAL NIGHTLY
Qty: 30 TABLET | Refills: 0 | Status: SHIPPED | OUTPATIENT
Start: 2023-01-10 | End: 2023-02-14 | Stop reason: SDUPTHER

## 2023-01-10 NOTE — PROGRESS NOTES
Subjective   Alfonso Barnett is a 52 y.o. male is here today for medication management follow-up.    Chief Complaint:  schizophrenia    History of Present Illness:   Patient presents today for a follow up for medication management for schizophrenia. Patient reports medication compliance and denies any side effects. Patient states he has been doing good. Patient states sleeping good and getting at least 6-7 hours. Denies any nightmares. Patient states appetite is good and eating at least 2-3 meals a day. Patient denies any panic attacks. Denies any paranoia. Patient states still helping taking care of grandkids and planning his wedding. Patient states depression is at a 6-7 on a 0-10 scale with 10 being the worst. Patient denies any mood swings. Denies any thoughts to harm self or others. Patient states anxiety is at a 7 on a 0-10 scale with 10 being the worst. Denies any auditory or visual hallucinations. Patient denies any medical changes since last visit.   The following portions of the patient's history were reviewed and updated as appropriate: allergies, current medications, past family history, past medical history, past social history, past surgical history and problem list.    Review of Systems   Constitutional: Negative.    Respiratory: Negative.    Cardiovascular: Negative.    Gastrointestinal: Negative.    Neurological: Negative.    Psychiatric/Behavioral: Positive for dysphoric mood. The patient is nervous/anxious.        Objective   Physical Exam  Vitals reviewed.   Constitutional:       Appearance: Normal appearance. He is well-developed and well-groomed.   Neurological:      Mental Status: He is alert.   Psychiatric:         Attention and Perception: Attention and perception normal.         Mood and Affect: Affect normal. Mood is anxious.         Speech: Speech normal.         Behavior: Behavior normal. Behavior is cooperative.         Thought Content: Thought content normal.         Cognition and  Memory: Cognition and memory normal.         Judgment: Judgment normal.       Blood pressure 120/84, pulse 80, weight 104 kg (230 lb). Body mass index is 33 kg/m².    No Known Allergies    Medication List:   Current Outpatient Medications   Medication Sig Dispense Refill   • amitriptyline (ELAVIL) 100 MG tablet Take 1 tablet by mouth Every Night. 30 tablet 0   • amitriptyline (ELAVIL) 50 MG tablet Take 1 tablet by mouth every night at bedtime. 30 tablet 0   • OLANZapine (zyPREXA) 5 MG tablet Take 1 tablet by mouth Every Night. 30 tablet 0   • atenolol (TENORMIN) 25 MG tablet Take 1 tablet by mouth Daily. 30 tablet 5   • chlorhexidine (PERIDEX) 0.12 % solution      • Cyanocobalamin (Vitamin B12) 1000 MCG tablet controlled-release Take 1 tablet by mouth Daily. 30 tablet 5   • ibuprofen (ADVIL,MOTRIN) 600 MG tablet Take 1 tablet by mouth 3 (Three) Times a Day As Needed for Mild Pain or Moderate Pain. 90 tablet 1   • lisinopril (PRINIVIL,ZESTRIL) 10 MG tablet Take 1 tablet by mouth Daily. 30 tablet 5   • simvastatin (ZOCOR) 40 MG tablet Take 1 tablet by mouth Every Night. 30 tablet 5   • vitamin D (ERGOCALCIFEROL) 1.25 MG (93527 UT) capsule capsule Take 1 capsule by mouth 1 (One) Time Per Week. 4 capsule 5     No current facility-administered medications for this visit.       Mental Status Exam:   Hygiene:   good  Cooperation:  Cooperative  Eye Contact:  Good  Psychomotor Behavior:  Appropriate  Affect:  Appropriate  Hopelessness: Denies  Speech:  Normal  Thought Process:  Goal directed and Linear  Thought Content:  Normal  Suicidal:  None  Homicidal:  None  Hallucinations:  None  Delusion:  None  Memory:  Intact  Orientation:  Person, Place, Time and Situation  Reliability:  fair  Insight:  Fair  Judgement:  Fair  Impulse Control:  Fair  Physical/Medical Issues:  No               Assessment & Plan   Diagnoses and all orders for this visit:    1. Paranoid schizophrenia (HCC)  -     OLANZapine (zyPREXA) 5 MG tablet; Take  1 tablet by mouth Every Night.  Dispense: 30 tablet; Refill: 0    2. Anxiety disorder due to known physiological condition  -     amitriptyline (ELAVIL) 100 MG tablet; Take 1 tablet by mouth Every Night.  Dispense: 30 tablet; Refill: 0  -     amitriptyline (ELAVIL) 50 MG tablet; Take 1 tablet by mouth every night at bedtime.  Dispense: 30 tablet; Refill: 0            Discussed medication options with patient. Cont. Zyprexa 5mg daily at night for schizophrenia. Cont. Amitriptyline 100mg daily at night for anxiety and depression. Cont. Amitriptyline 50mg daily at night for anxiety and depression. Reviewed the risks, benefits, and side effects of the medications; patient acknowledged and verbally consented. Patient was instructed on medication side effects, benefits, and also of no treatment.  Patient was given an explanation regarding potential for increased risk of diabetes, lipids, and weight gain.  Labs will be assessed as clinically indicated.  Diet was discussed especially healthy diet choices and increasing activity and exercise.  Patient was strongly urged to continue weight maintance or weight loss efforts.  Patient reported verbalized understanding of instructions.  Reviewed lab work from 10/19/22 including Vit. B12, A1C, TSH, Lipid panel, CBC, and Vit. D.    Patient is agreeable to call the office with any questions, concerns, or worsening of symptoms. Patient is aware to call 911 or go to the nearest ER should begin having SI/HI.          Follow up in four weeks        Errors in dictation may reflect use of voice recognition software and not all errors in transcription may have been detected prior to signing.              This document has been electronically signed by ADOLFO Barbour   January 10, 2023 09:00 EST

## 2023-01-19 ENCOUNTER — OFFICE VISIT (OUTPATIENT)
Dept: FAMILY MEDICINE CLINIC | Facility: CLINIC | Age: 53
End: 2023-01-19
Payer: MEDICARE

## 2023-01-19 VITALS
TEMPERATURE: 98.4 F | WEIGHT: 229.6 LBS | RESPIRATION RATE: 18 BRPM | HEIGHT: 70 IN | BODY MASS INDEX: 32.87 KG/M2 | OXYGEN SATURATION: 98 % | SYSTOLIC BLOOD PRESSURE: 118 MMHG | DIASTOLIC BLOOD PRESSURE: 74 MMHG | HEART RATE: 92 BPM

## 2023-01-19 DIAGNOSIS — M79.605 PAIN IN BOTH LOWER EXTREMITIES: ICD-10-CM

## 2023-01-19 DIAGNOSIS — Z00.00 MEDICARE ANNUAL WELLNESS VISIT, SUBSEQUENT: Primary | ICD-10-CM

## 2023-01-19 DIAGNOSIS — Z00.00 VISIT FOR ANNUAL HEALTH EXAMINATION: ICD-10-CM

## 2023-01-19 DIAGNOSIS — I10 ESSENTIAL HYPERTENSION: ICD-10-CM

## 2023-01-19 DIAGNOSIS — E53.8 VITAMIN B 12 DEFICIENCY: ICD-10-CM

## 2023-01-19 DIAGNOSIS — I79.8 OTHER DISORDERS OF ARTERIES, ARTERIOLES AND CAPILLARIES IN DISEASES CLASSIFIED ELSEWHERE: ICD-10-CM

## 2023-01-19 DIAGNOSIS — M79.604 PAIN IN BOTH LOWER EXTREMITIES: ICD-10-CM

## 2023-01-19 DIAGNOSIS — E78.2 MIXED HYPERLIPIDEMIA: ICD-10-CM

## 2023-01-19 DIAGNOSIS — E55.9 VITAMIN D DEFICIENCY: ICD-10-CM

## 2023-01-19 PROCEDURE — 1159F MED LIST DOCD IN RCRD: CPT | Performed by: NURSE PRACTITIONER

## 2023-01-19 PROCEDURE — G0439 PPPS, SUBSEQ VISIT: HCPCS | Performed by: NURSE PRACTITIONER

## 2023-01-19 PROCEDURE — 1170F FXNL STATUS ASSESSED: CPT | Performed by: NURSE PRACTITIONER

## 2023-01-19 RX ORDER — ASPIRIN 81 MG/1
81 TABLET ORAL DAILY
Qty: 90 TABLET | Refills: 2 | Status: SHIPPED | OUTPATIENT
Start: 2023-01-19

## 2023-01-19 RX ORDER — SIMVASTATIN 40 MG
40 TABLET ORAL NIGHTLY
Qty: 30 TABLET | Refills: 5 | Status: SHIPPED | OUTPATIENT
Start: 2023-01-19

## 2023-01-19 RX ORDER — ERGOCALCIFEROL 1.25 MG/1
50000 CAPSULE ORAL WEEKLY
Qty: 4 CAPSULE | Refills: 5 | Status: SHIPPED | OUTPATIENT
Start: 2023-01-19

## 2023-01-19 RX ORDER — ATENOLOL 25 MG/1
25 TABLET ORAL DAILY
Qty: 30 TABLET | Refills: 5 | Status: SHIPPED | OUTPATIENT
Start: 2023-01-19

## 2023-01-19 NOTE — PROGRESS NOTES
"The ABCs of the Annual Wellness Visit  Subsequent Medicare Wellness Visit    Subjective      Alfonso Barnett is a 52 y.o. male who presents for a Subsequent Medicare Wellness Visit.    Hypertension-chronic and ongoing.  Patient is currently taking atenolol 25 mg and lisinopril 10 mg.  Negative side effects.  No associated symptoms such as CP, SOA, HA, or dizziness.  He is not monitoring at home but has not had any readings.  He has been on  in the past but stopped due to a lot of bruising.  He questions if he should be back on a low dose.   Vitamin D deficiency-chronic and ongoing.  Patient is presently ordered vitamin D 50,000 units supplement.  He reports that he has not been getting his Vit D from his pharmacy.  Vitamin B12 deficiency-chronic and ongoing.  Patient is presently taking vitamin B12 supplement.  Patient is tolerating well and denies any negative side effects.  Hyperlipidemia-chronic and ongoing.  Patient is currently taking simvastatin 40 mg.  He is trying to watch his diet.  Patient denies any negative side effects of cholesterol medication.  No reported myalgia or myopathies.  Mental health-patient is under the care of mental health provider.  He is currently on Zyprexa 5 mg nightly as well as Elavil 150 mg.  He reports that he is only taking 100 mg of Elavil tho.  No negative side effects.  He feels that his mood and behaviors are stable.  He reports without meds he cannot sleep.  He feels that he \"would feel better off the meds\".   Leg concern-reports he missed the appt for his dopplers.   He denies any new concerns.   His initial concern he reports a person from his insurance came out to his home and reported to him that he had some blockages in his legs.  He reports that she had a machine that she tested him with.  He would like to have further work as \"that is scary and concerning\".  He reports that he is not having any significant leg concerns.     The following portions of the patient's " history were reviewed and   updated as appropriate: allergies, current medications, past family history, past medical history, past social history, past surgical history and problem list.    Compared to one year ago, the patient feels his physical   health is worse as he feels he has some SOA at times, especially when exerting or carrying objects.    Compared to one year ago, the patient feels his mental   health is better.    Recent Hospitalizations:  He was not admitted to the hospital during the last year.     Review of Systems   Constitutional: Positive for fatigue. Negative for appetite change, unexpected weight gain and unexpected weight loss.   HENT: Negative for congestion, ear pain, postnasal drip, rhinorrhea, sore throat, swollen glands, trouble swallowing and voice change.    Eyes: Negative for pain and visual disturbance.   Respiratory: Negative for cough, chest tightness, shortness of breath and wheezing.    Cardiovascular: Negative for chest pain, palpitations and leg swelling.   Gastrointestinal: Negative for abdominal pain, blood in stool, constipation, diarrhea, nausea and indigestion.   Genitourinary: Negative for dysuria, hematuria and urgency.   Musculoskeletal: Positive for arthralgias and myalgias. Negative for back pain, gait problem and joint swelling.   Skin: Negative for color change and skin lesions.   Allergic/Immunologic: Negative.    Neurological: Negative for numbness, headache and confusion.   Hematological: Negative.    Psychiatric/Behavioral: Positive for dysphoric mood and stress. Negative for agitation, behavioral problems, sleep disturbance and suicidal ideas. The patient is not nervous/anxious.    All other systems reviewed and are negative.    Physical Exam  Vitals reviewed.   Constitutional:       General: He is not in acute distress.     Appearance: He is well-developed. He is not diaphoretic.   HENT:      Head: Normocephalic and atraumatic.      Jaw: No tenderness.       Comments: Oropharynx not examined.  Patient is presently wearing a face covering/mask due to COVID-19 pandemic.     Right Ear: Hearing, tympanic membrane, ear canal and external ear normal.      Left Ear: Hearing, tympanic membrane, ear canal and external ear normal.   Eyes:      General: Lids are normal. No scleral icterus.     Extraocular Movements:      Right eye: Normal extraocular motion and no nystagmus.      Left eye: Normal extraocular motion and no nystagmus.      Conjunctiva/sclera: Conjunctivae normal.      Pupils: Pupils are equal, round, and reactive to light.   Neck:      Thyroid: No thyromegaly or thyroid tenderness.      Vascular: No carotid bruit or JVD.      Trachea: No tracheal tenderness.   Cardiovascular:      Rate and Rhythm: Normal rate and regular rhythm.      Pulses:           Dorsalis pedis pulses are 2+ on the right side and 2+ on the left side.        Posterior tibial pulses are 2+ on the right side and 2+ on the left side.      Heart sounds: Normal heart sounds, S1 normal and S2 normal. No murmur heard.  Pulmonary:      Effort: Pulmonary effort is normal. No accessory muscle usage, prolonged expiration or respiratory distress.      Breath sounds: Normal breath sounds. No wheezing.   Chest:      Chest wall: No tenderness.   Abdominal:      General: Bowel sounds are normal. There is no distension.      Palpations: Abdomen is soft. There is no hepatomegaly, splenomegaly or mass.      Tenderness: There is no abdominal tenderness.   Musculoskeletal:         General: Tenderness (generalized) present.      Cervical back: Normal range of motion and neck supple.      Right lower leg: No edema.      Left lower leg: No edema.      Comments: No muscular atrophy or flaccidity.   Lymphadenopathy:      Head:      Right side of head: No submental or submandibular adenopathy.      Left side of head: No submental or submandibular adenopathy.      Cervical: No cervical adenopathy.      Right cervical: No  superficial cervical adenopathy.     Left cervical: No superficial cervical adenopathy.   Skin:     General: Skin is warm and dry.      Capillary Refill: Capillary refill takes less than 2 seconds.      Coloration: Skin is not jaundiced or pale.      Findings: No erythema.      Nails: There is no clubbing.   Neurological:      Mental Status: He is alert and oriented to person, place, and time.      Cranial Nerves: No cranial nerve deficit or facial asymmetry.      Sensory: No sensory deficit.      Motor: No weakness, tremor, atrophy or abnormal muscle tone.      Coordination: Coordination normal.      Gait: Gait abnormal (mild antalgia).      Deep Tendon Reflexes: Reflexes are normal and symmetric.   Psychiatric:         Attention and Perception: He is attentive.         Mood and Affect: Mood normal.         Speech: Speech normal.         Behavior: Behavior normal. Behavior is cooperative.         Thought Content: Thought content normal.         Judgment: Judgment normal.           Current Medical Providers:  Patient Care Team:  Aster Pa APRN as PCP - General (Family Medicine)    Outpatient Medications Prior to Visit   Medication Sig Dispense Refill   • amitriptyline (ELAVIL) 100 MG tablet Take 1 tablet by mouth Every Night. 30 tablet 0   • amitriptyline (ELAVIL) 50 MG tablet Take 1 tablet by mouth every night at bedtime. 30 tablet 0   • chlorhexidine (PERIDEX) 0.12 % solution      • ibuprofen (ADVIL,MOTRIN) 600 MG tablet Take 1 tablet by mouth 3 (Three) Times a Day As Needed for Mild Pain or Moderate Pain. 90 tablet 1   • lisinopril (PRINIVIL,ZESTRIL) 10 MG tablet Take 1 tablet by mouth Daily. 30 tablet 5   • OLANZapine (zyPREXA) 5 MG tablet Take 1 tablet by mouth Every Night. 30 tablet 0   • atenolol (TENORMIN) 25 MG tablet Take 1 tablet by mouth Daily. 30 tablet 5   • Cyanocobalamin (Vitamin B12) 1000 MCG tablet controlled-release Take 1 tablet by mouth Daily. 30 tablet 5   • simvastatin (ZOCOR) 40 MG  "tablet Take 1 tablet by mouth Every Night. 30 tablet 5   • vitamin D (ERGOCALCIFEROL) 1.25 MG (72707 UT) capsule capsule Take 1 capsule by mouth 1 (One) Time Per Week. 4 capsule 5     No facility-administered medications prior to visit.     /74   Pulse 92   Temp 98.4 °F (36.9 °C)   Resp 18   Ht 177.8 cm (70\")   Wt 104 kg (229 lb 9.6 oz)   SpO2 98%   BMI 32.94 kg/m²       No opioid medication identified on active medication list. I have reviewed chart for other potential  high risk medication/s and harmful drug interactions in the elderly.          Aspirin is not on active medication list.  Aspirin use is indicated based on review of current medical condition/s. Pros and cons of this therapy have been discussed with this patient. Benefits of this medication outweigh potential harm.  Patient has been instructed to start taking this medication..    Patient Active Problem List   Diagnosis   • Paranoid schizophrenia (HCC)   • Depression   • Hyperlipidemia   • Essential hypertension   • Anxiety disorder   • Vitamin D deficiency   • Drug-induced erectile dysfunction   • Vision changes   • Prostate disorder   • High risk heterosexual behavior   • Class 1 obesity in adult   • Primary insomnia   • Vitamin B 12 deficiency   • Pain in both lower extremities     Advance Care Planning  Advance Directive is not on file.  ACP discussion was held with the patient during this visit. Patient does not have an advance directive, information provided.     Objective    Vitals:    01/19/23 1000   BP: 118/74   Pulse: 92   Resp: 18   Temp: 98.4 °F (36.9 °C)   SpO2: 98%   Weight: 104 kg (229 lb 9.6 oz)   Height: 177.8 cm (70\")     Estimated body mass index is 32.94 kg/m² as calculated from the following:    Height as of this encounter: 177.8 cm (70\").    Weight as of this encounter: 104 kg (229 lb 9.6 oz).    BMI is >= 30 and <35. (Class 1 Obesity). The following options were offered after discussion;: nutrition " counseling/recommendations      Does the patient have evidence of cognitive impairment?   No             HEALTH RISK ASSESSMENT    Smoking Status:  Social History     Tobacco Use   Smoking Status Never   Smokeless Tobacco Never     Alcohol Consumption:  Social History     Substance and Sexual Activity   Alcohol Use No     Fall Risk Screen:    STEVEN Fall Risk Assessment was completed, and patient is at LOW risk for falls.Assessment completed on:1/19/2023    Depression Screening:  PHQ-2/PHQ-9 Depression Screening 1/19/2023   Little Interest or Pleasure in Doing Things 0-->not at all   Feeling Down, Depressed or Hopeless 0-->not at all   PHQ-9: Brief Depression Severity Measure Score 0       Health Habits and Functional and Cognitive Screening:  Functional & Cognitive Status 1/19/2023   Do you have difficulty preparing food and eating? No   Do you have difficulty bathing yourself, getting dressed or grooming yourself? No   Do you have difficulty using the toilet? No   Do you have difficulty moving around from place to place? No   Do you have trouble with steps or getting out of a bed or a chair? No   Current Diet Unhealthy Diet   Dental Exam Up to date   Eye Exam Up to date   Exercise (times per week) 3 times per week   Current Exercises Include Walking   Current Exercise Activities Include -   Do you need help using the phone?  No   Are you deaf or do you have serious difficulty hearing?  No   Do you need help with transportation? No   Do you need help shopping? No   Do you need help preparing meals?  No   Do you need help with housework?  No   Do you need help with laundry? No   Do you need help taking your medications? No   Do you need help managing money? No   Do you ever drive or ride in a car without wearing a seat belt? No   Have you felt unusual stress, anger or loneliness in the last month? No   Who do you live with? Child   If you need help, do you have trouble finding someone available to you? No   Have you  been bothered in the last four weeks by sexual problems? No   Do you have difficulty concentrating, remembering or making decisions? No       Age-appropriate Screening Schedule:  Refer to the list below for future screening recommendations based on patient's age, sex and/or medical conditions. Orders for these recommended tests are listed in the plan section. The patient has been provided with a written plan.    Health Maintenance   Topic Date Due   • DXA SCAN  Never done   • ZOSTER VACCINE (1 of 2) Never done   • DIABETIC EYE EXAM  05/08/2019   • INFLUENZA VACCINE  03/31/2023 (Originally 8/1/2022)   • URINE MICROALBUMIN  03/15/2023   • HEMOGLOBIN A1C  04/19/2023   • PROSTATE CANCER SCREENING  10/19/2023   • LIPID PANEL  10/19/2023   • TDAP/TD VACCINES (2 - Td or Tdap) 05/01/2027   • DIABETIC FOOT EXAM  Discontinued                CMS Preventative Services Quick Reference  Risk Factors Identified During Encounter:    Depression/Dysphoria: Current medication is effective, no change recommended and continue under care of mental health provider    The above risks/problems have been discussed with the patient.  Pertinent information has been shared with the patient in the After Visit Summary.    Diagnoses and all orders for this visit:    1. Medicare annual wellness visit, subsequent (Primary)    2. Visit for annual health examination    3. Pain in both lower extremities  -     US Venous Doppler Lower Extremity Bilateral (duplex); Future  -     US Arterial Doppler Lower Extremity Bilateral; Future  -     aspirin 81 MG EC tablet; Take 1 tablet by mouth Daily.  Dispense: 90 tablet; Refill: 2    4. 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  Dispense: 90 tablet; Refill: 2    5.  Vitamin D deficiency  Comments:  updated Vit d level ordered today.  will resend vit D supplement to pharmacy    Orders:  -     vitamin D (ERGOCALCIFEROL) 1.25 MG (44589 UT) capsule capsule; Take 1 capsule by mouth 1 (One) Time Per Week.  Dispense: 4 capsule; Refill: 5    6. Vitamin B 12 deficiency  Comments:  We will plan for an updated B12 level.  Patient to continue his supplement  Orders:  -     Cyanocobalamin (Vitamin B12) 1000 MCG tablet controlled-release; Take 1 tablet by mouth Daily.  Dispense: 30 tablet; Refill: 5    7. Mixed hyperlipidemia  Comments:  Continue statin  updated lipid panel ordered.  Orders:  -     simvastatin (ZOCOR) 40 MG tablet; Take 1 tablet by mouth Every Night.  Dispense: 30 tablet; Refill: 5    8. Essential hypertension  Comments:  Continue lisinopril and atenolol, weight loss recommended  Orders:  -     atenolol (TENORMIN) 25 MG tablet; Take 1 tablet by mouth Daily.  Dispense: 30 tablet; Refill: 5        Follow Up:   Next Medicare Wellness visit to be scheduled in 1 year.      Counseling provided today including importance of good nutrition, exercise as tolerated, dental health, stress reduction and mental health. Importance of immunizations discussed.  Routine maintenance such as paps and breast exams recommended.   Counseled on safe sex practices and STD prevention.   Counseling regarding gun and water safety, domestic violence, and seatbelt use.      Dietary counseling provided:  Healthy food choices including fruits and vegetables, limit soda and junk foods, adequate water intake.  Increase in physical activity advised at least 30 minutes per day 3-5 days per week.    Patient assessed today for fall risk.  Patient advised to limit clutter, have well lit areas, do not walk around in darkness (use nightlight PRN), Non skid rugs if using rugs, use caution if small pets at home.  Non skid foot wear.      Will reorder doppler evaluation of patient's leg per his request. Discussed  importance of test.    Will plan for updated fasting labs.     RTC 3 months, sooner if needed.       An After Visit Summary and PPPS were made available to the patient.          This document has been electronically signed by:  ADOLFO Magdaelno FNP-C Dragon disclaimer:  Part of this note may be an electronic transcription/translation of spoken language to printed text using the Dragon Dictation System.

## 2023-02-14 ENCOUNTER — OFFICE VISIT (OUTPATIENT)
Dept: PSYCHIATRY | Facility: CLINIC | Age: 53
End: 2023-02-14
Payer: MEDICARE

## 2023-02-14 VITALS
DIASTOLIC BLOOD PRESSURE: 74 MMHG | SYSTOLIC BLOOD PRESSURE: 128 MMHG | WEIGHT: 230 LBS | OXYGEN SATURATION: 100 % | HEART RATE: 89 BPM | BODY MASS INDEX: 33 KG/M2

## 2023-02-14 DIAGNOSIS — F06.4 ANXIETY DISORDER DUE TO KNOWN PHYSIOLOGICAL CONDITION: ICD-10-CM

## 2023-02-14 DIAGNOSIS — F20.0 PARANOID SCHIZOPHRENIA: ICD-10-CM

## 2023-02-14 PROCEDURE — 99213 OFFICE O/P EST LOW 20 MIN: CPT | Performed by: NURSE PRACTITIONER

## 2023-02-14 RX ORDER — AMITRIPTYLINE HYDROCHLORIDE 100 MG/1
100 TABLET, FILM COATED ORAL NIGHTLY
Qty: 30 TABLET | Refills: 1 | Status: SHIPPED | OUTPATIENT
Start: 2023-02-14

## 2023-02-14 RX ORDER — AMITRIPTYLINE HYDROCHLORIDE 50 MG/1
50 TABLET, FILM COATED ORAL
Qty: 30 TABLET | Refills: 1 | Status: SHIPPED | OUTPATIENT
Start: 2023-02-14

## 2023-02-14 RX ORDER — OLANZAPINE 5 MG/1
5 TABLET ORAL NIGHTLY
Qty: 30 TABLET | Refills: 1 | Status: SHIPPED | OUTPATIENT
Start: 2023-02-14

## 2023-02-14 NOTE — PROGRESS NOTES
Subjective   Alfonso Barnett is a 52 y.o. male is here today for medication management follow-up.    Chief Complaint:  Schizophrenia     History of Present Illness:    Patient presents today for a follow up for medication management for schizophrenia. Patient states he has been doing good. Patient states the kids are doing good and enjoying school. Patient states helping brother some. Patient states sleeping good and getting about 5-6 hours a night. Patient states waking up some mornings feel good but not all the time. Denies any nightmares. Patient states sometimes have a trouble falling asleep. Patient states getting  Friday and got everything ready. Patient states appetite is good and eating at least 2-3 meals a day. Patient states depression is at a 5 on a 0-10 scale with 10 being the worst. Denies any panic attacks. Denies any paranoia. Patient states think things sometimes that come into mind but able to catch it. Denies any auditory or visual hallucinations. Denies any anger or aggression. Patient states anxiety is at a 6 on a 0-10 scale with 10 being the worst. Denies any thoughts to harm self or others. Patient reports medication compliance and denies any side effects. Denies any medical changes since last visit.   The following portions of the patient's history were reviewed and updated as appropriate: allergies, current medications, past family history, past medical history, past social history, past surgical history and problem list.    Review of Systems   Constitutional: Negative.    Respiratory: Negative.    Cardiovascular: Negative.    Gastrointestinal: Negative.    Neurological: Negative.    Psychiatric/Behavioral: Positive for dysphoric mood and sleep disturbance. The patient is nervous/anxious.        Objective   Physical Exam  Vitals reviewed.   Constitutional:       Appearance: Normal appearance. He is well-developed and well-groomed.   Neurological:      Mental Status: He is alert.    Psychiatric:         Attention and Perception: Attention and perception normal.         Mood and Affect: Affect normal. Mood is anxious.         Speech: Speech normal.         Behavior: Behavior normal. Behavior is cooperative.         Thought Content: Thought content normal.         Cognition and Memory: Cognition and memory normal.         Judgment: Judgment normal.       Blood pressure 128/74, pulse 89, weight 104 kg (230 lb), SpO2 100 %. Body mass index is 33 kg/m².    No Known Allergies    Medication List:   Current Outpatient Medications   Medication Sig Dispense Refill   • amitriptyline (ELAVIL) 100 MG tablet Take 1 tablet by mouth Every Night. 30 tablet 1   • amitriptyline (ELAVIL) 50 MG tablet Take 1 tablet by mouth every night at bedtime. 30 tablet 1   • OLANZapine (zyPREXA) 5 MG tablet Take 1 tablet by mouth Every Night. 30 tablet 1   • aspirin 81 MG EC tablet Take 1 tablet by mouth Daily. 90 tablet 2   • atenolol (TENORMIN) 25 MG tablet Take 1 tablet by mouth Daily. 30 tablet 5   • chlorhexidine (PERIDEX) 0.12 % solution      • Cyanocobalamin (Vitamin B12) 1000 MCG tablet controlled-release Take 1 tablet by mouth Daily. 30 tablet 5   • ibuprofen (ADVIL,MOTRIN) 600 MG tablet Take 1 tablet by mouth 3 (Three) Times a Day As Needed for Mild Pain or Moderate Pain. 90 tablet 1   • lisinopril (PRINIVIL,ZESTRIL) 10 MG tablet Take 1 tablet by mouth Daily. 30 tablet 5   • simvastatin (ZOCOR) 40 MG tablet Take 1 tablet by mouth Every Night. 30 tablet 5   • vitamin D (ERGOCALCIFEROL) 1.25 MG (18148 UT) capsule capsule Take 1 capsule by mouth 1 (One) Time Per Week. 4 capsule 5     No current facility-administered medications for this visit.       Mental Status Exam:   Hygiene:   good  Cooperation:  Cooperative  Eye Contact:  Good  Psychomotor Behavior:  Appropriate  Affect:  Appropriate  Hopelessness: Denies  Speech:  Normal  Thought Process:  Goal directed and Linear  Thought Content:  Normal  Suicidal:   None  Homicidal:  None  Hallucinations:  None  Delusion:  None  Memory:  Intact  Orientation:  Person, Place, Time and Situation  Reliability:  fair  Insight:  Fair  Judgement:  Fair  Impulse Control:  Fair  Physical/Medical Issues:  No                 Assessment & Plan   Diagnoses and all orders for this visit:    1. Anxiety disorder due to known physiological condition  -     amitriptyline (ELAVIL) 100 MG tablet; Take 1 tablet by mouth Every Night.  Dispense: 30 tablet; Refill: 1  -     amitriptyline (ELAVIL) 50 MG tablet; Take 1 tablet by mouth every night at bedtime.  Dispense: 30 tablet; Refill: 1    2. Paranoid schizophrenia (HCC)  -     OLANZapine (zyPREXA) 5 MG tablet; Take 1 tablet by mouth Every Night.  Dispense: 30 tablet; Refill: 1            Discussed medication options with patient. Cont. Zyprexa 5mg daily at night for mood and schizophrenia. Cont. Amitripyline 100mg daily at night for anxiety and mood. Cont. Amitripyline 50mg daily at night for anxiety and mood.  Discussed with patient will consider alternative options for insomnia to discuss at next visit. Reviewed the risks, benefits, and side effects of the medications; patient acknowledged and verbally consented. Patient was instructed on medication side effects, benefits, and also of no treatment.  Patient was given an explanation regarding potential for increased risk of diabetes, lipids, and weight gain.  Labs will be assessed as clinically indicated.  Diet was discussed especially healthy diet choices and increasing activity and exercise.  Patient was strongly urged to continue weight maintance or weight loss efforts.  Patient reported verbalized understanding of instructions.   Patient is agreeable to call the office with any questions, concerns, or worsening of symptoms. Patient is aware to call 911 or go to the nearest ER should begin having SI/HI.          Follow up in four weeks          Errors in dictation may reflect use of voice  recognition software and not all errors in transcription may have been detected prior to signing.              This document has been electronically signed by ADOLFO Barbour   February 14, 2023 12:26 EST

## 2023-04-10 ENCOUNTER — OFFICE VISIT (OUTPATIENT)
Dept: PSYCHIATRY | Facility: CLINIC | Age: 53
End: 2023-04-10
Payer: MEDICARE

## 2023-04-10 VITALS
DIASTOLIC BLOOD PRESSURE: 72 MMHG | BODY MASS INDEX: 32.51 KG/M2 | HEART RATE: 80 BPM | OXYGEN SATURATION: 100 % | WEIGHT: 226.6 LBS | SYSTOLIC BLOOD PRESSURE: 128 MMHG

## 2023-04-10 DIAGNOSIS — F06.4 ANXIETY DISORDER DUE TO KNOWN PHYSIOLOGICAL CONDITION: ICD-10-CM

## 2023-04-10 DIAGNOSIS — F20.0 PARANOID SCHIZOPHRENIA: ICD-10-CM

## 2023-04-10 PROCEDURE — 3074F SYST BP LT 130 MM HG: CPT | Performed by: NURSE PRACTITIONER

## 2023-04-10 PROCEDURE — 99213 OFFICE O/P EST LOW 20 MIN: CPT | Performed by: NURSE PRACTITIONER

## 2023-04-10 PROCEDURE — 3078F DIAST BP <80 MM HG: CPT | Performed by: NURSE PRACTITIONER

## 2023-04-10 PROCEDURE — 1160F RVW MEDS BY RX/DR IN RCRD: CPT | Performed by: NURSE PRACTITIONER

## 2023-04-10 PROCEDURE — 1159F MED LIST DOCD IN RCRD: CPT | Performed by: NURSE PRACTITIONER

## 2023-04-10 RX ORDER — OLANZAPINE 5 MG/1
5 TABLET ORAL NIGHTLY
Qty: 30 TABLET | Refills: 1 | Status: SHIPPED | OUTPATIENT
Start: 2023-04-10

## 2023-04-10 RX ORDER — AMITRIPTYLINE HYDROCHLORIDE 100 MG/1
100 TABLET, FILM COATED ORAL NIGHTLY
Qty: 30 TABLET | Refills: 1 | Status: SHIPPED | OUTPATIENT
Start: 2023-04-10

## 2023-04-10 RX ORDER — AMITRIPTYLINE HYDROCHLORIDE 50 MG/1
50 TABLET, FILM COATED ORAL
Qty: 30 TABLET | Refills: 1 | Status: SHIPPED | OUTPATIENT
Start: 2023-04-10

## 2023-04-10 NOTE — PROGRESS NOTES
Subjective   Alfonso Barnett is a 52 y.o. male is here today for medication management follow-up.    Chief Complaint:  schizophrenia     History of Present Illness:   Patient presents today for a follow up for medication management for schizophrenia disorder. Patient states feels like he is doing really good. Patient states mood has been doing good. Patient states recently got  on the 17th of last month. Patient states sleeping better this past week due to spring break. Patient states sleeping about 5-6 hours most nights. Denies any nightmares. Patient states appetite is good and eating at least 2-3 meals a day. Denies any panic attacks. Denies any paranoia. Patient states had some paranoia starting about three weeks ago but went away. Patient states depression was also about three weeks ago but better now. Patient states depression is at a 3-4 on a 0-10 scale with 10 being the worst. Patient anxiety is at a 3-4 on a 0-10 scale with 10 the worst. Denies any thoughts to harm self or others. Denies any auditory or visual hallucinations. Denies any medical changes since last visit. Patient reports medication compliance. Patient states having some sexual side effects still and found an old prescription for Sildenafil that he took some of.   The following portions of the patient's history were reviewed and updated as appropriate: allergies, current medications, past family history, past medical history, past social history, past surgical history and problem list.    Review of Systems   Constitutional: Negative.    Respiratory: Negative.    Cardiovascular: Negative.    Gastrointestinal: Negative.    Neurological: Negative.    Psychiatric/Behavioral: Positive for dysphoric mood. The patient is nervous/anxious.        Objective   Physical Exam  Vitals reviewed.   Constitutional:       Appearance: Normal appearance. He is well-developed and well-groomed.   Neurological:      Mental Status: He is alert.    Psychiatric:         Attention and Perception: Attention and perception normal.         Mood and Affect: Affect normal. Mood is anxious.         Speech: Speech normal.         Behavior: Behavior normal. Behavior is cooperative.         Thought Content: Thought content normal.         Cognition and Memory: Cognition and memory normal.         Judgment: Judgment normal.       Blood pressure 128/72, pulse 80, weight 103 kg (226 lb 9.6 oz), SpO2 100 %. Body mass index is 32.51 kg/m².    No Known Allergies    Medication List:   Current Outpatient Medications   Medication Sig Dispense Refill   • amitriptyline (ELAVIL) 100 MG tablet Take 1 tablet by mouth Every Night. 30 tablet 1   • amitriptyline (ELAVIL) 50 MG tablet Take 1 tablet by mouth every night at bedtime. 30 tablet 1   • OLANZapine (zyPREXA) 5 MG tablet Take 1 tablet by mouth Every Night. 30 tablet 1   • aspirin 81 MG EC tablet Take 1 tablet by mouth Daily. 90 tablet 2   • atenolol (TENORMIN) 25 MG tablet Take 1 tablet by mouth Daily. 30 tablet 5   • chlorhexidine (PERIDEX) 0.12 % solution      • Cyanocobalamin (Vitamin B12) 1000 MCG tablet controlled-release Take 1 tablet by mouth Daily. 30 tablet 5   • ibuprofen (ADVIL,MOTRIN) 600 MG tablet Take 1 tablet by mouth 3 (Three) Times a Day As Needed for Mild Pain or Moderate Pain. 90 tablet 1   • lisinopril (PRINIVIL,ZESTRIL) 10 MG tablet Take 1 tablet by mouth Daily. 30 tablet 5   • simvastatin (ZOCOR) 40 MG tablet Take 1 tablet by mouth Every Night. 30 tablet 5   • vitamin D (ERGOCALCIFEROL) 1.25 MG (05222 UT) capsule capsule Take 1 capsule by mouth 1 (One) Time Per Week. 4 capsule 5     No current facility-administered medications for this visit.       Mental Status Exam:   Hygiene:   good  Cooperation:  Cooperative  Eye Contact:  Good  Psychomotor Behavior:  Appropriate  Affect:  Appropriate  Hopelessness: Denies  Speech:  Normal  Thought Process:  Goal directed and Linear  Thought Content:  Normal  Suicidal:   None  Homicidal:  None  Hallucinations:  None  Delusion:  None  Memory:  Intact  Orientation:  Person, Place, Time and Situation  Reliability:  fair  Insight:  Fair  Judgement:  Fair  Impulse Control:  Fair  Physical/Medical Issues:  No                 Assessment & Plan   Diagnoses and all orders for this visit:    1. Paranoid schizophrenia  -     OLANZapine (zyPREXA) 5 MG tablet; Take 1 tablet by mouth Every Night.  Dispense: 30 tablet; Refill: 1    2. Anxiety disorder due to known physiological condition  -     amitriptyline (ELAVIL) 100 MG tablet; Take 1 tablet by mouth Every Night.  Dispense: 30 tablet; Refill: 1  -     amitriptyline (ELAVIL) 50 MG tablet; Take 1 tablet by mouth every night at bedtime.  Dispense: 30 tablet; Refill: 1            Discussed medication options with patient. Cont. Zyprexa 5mg daily at night for schizophrenia. Cont. Amitriptyline 100mg daily at night for anxiety. Cont. Amitriptyline 50mg daily at night for anxiety. Reviewed the risks, benefits, and side effects of the medications; patient acknowledged and verbally consented. Patient was instructed on medication side effects, benefits, and also of no treatment.  Patient was given an explanation regarding potential for increased risk of diabetes, lipids, and weight gain.  Labs will be assessed as clinically indicated.  Diet was discussed especially healthy diet choices and increasing activity and exercise.  Patient was strongly urged to continue weight maintance or weight loss efforts.  Patient reported verbalized understanding of instructions.   Patient is agreeable to call the office with any questions, concerns, or worsening of symptoms. Patient is aware to call 911 or go to the nearest ER should begin having SI/HI.          Follow up in two months        Errors in dictation may reflect use of voice recognition software and not all errors in transcription may have been detected prior to signing.              This document has been  electronically signed by ADOLFO Barbour   April 10, 2023 12:08 EDT

## 2023-04-11 RX ORDER — SILDENAFIL CITRATE 20 MG/1
20-40 TABLET ORAL DAILY PRN
Qty: 60 TABLET | Refills: 5 | Status: SHIPPED | OUTPATIENT
Start: 2023-04-11

## 2023-04-19 ENCOUNTER — OFFICE VISIT (OUTPATIENT)
Dept: FAMILY MEDICINE CLINIC | Facility: CLINIC | Age: 53
End: 2023-04-19
Payer: MEDICARE

## 2023-04-19 VITALS
BODY MASS INDEX: 32.44 KG/M2 | WEIGHT: 226.6 LBS | RESPIRATION RATE: 20 BRPM | OXYGEN SATURATION: 99 % | TEMPERATURE: 98.6 F | HEIGHT: 70 IN | SYSTOLIC BLOOD PRESSURE: 124 MMHG | HEART RATE: 100 BPM | DIASTOLIC BLOOD PRESSURE: 82 MMHG

## 2023-04-19 DIAGNOSIS — E53.8 VITAMIN B 12 DEFICIENCY: ICD-10-CM

## 2023-04-19 DIAGNOSIS — I10 ESSENTIAL HYPERTENSION: Primary | ICD-10-CM

## 2023-04-19 DIAGNOSIS — E55.9 VITAMIN D DEFICIENCY: ICD-10-CM

## 2023-04-19 DIAGNOSIS — E11.9 TYPE 2 DIABETES MELLITUS WITHOUT COMPLICATION, WITHOUT LONG-TERM CURRENT USE OF INSULIN: ICD-10-CM

## 2023-04-19 DIAGNOSIS — F06.4 ANXIETY DISORDER DUE TO KNOWN PHYSIOLOGICAL CONDITION: ICD-10-CM

## 2023-04-19 DIAGNOSIS — E78.2 MIXED HYPERLIPIDEMIA: ICD-10-CM

## 2023-04-19 DIAGNOSIS — N52.2 DRUG-INDUCED ERECTILE DYSFUNCTION: ICD-10-CM

## 2023-04-19 DIAGNOSIS — R53.83 OTHER FATIGUE: ICD-10-CM

## 2023-04-19 PROCEDURE — 85025 COMPLETE CBC W/AUTO DIFF WBC: CPT | Performed by: NURSE PRACTITIONER

## 2023-04-19 PROCEDURE — 82043 UR ALBUMIN QUANTITATIVE: CPT | Performed by: NURSE PRACTITIONER

## 2023-04-19 PROCEDURE — 84439 ASSAY OF FREE THYROXINE: CPT | Performed by: NURSE PRACTITIONER

## 2023-04-19 PROCEDURE — 80053 COMPREHEN METABOLIC PANEL: CPT | Performed by: NURSE PRACTITIONER

## 2023-04-19 PROCEDURE — 82607 VITAMIN B-12: CPT | Performed by: NURSE PRACTITIONER

## 2023-04-19 PROCEDURE — 82306 VITAMIN D 25 HYDROXY: CPT | Performed by: NURSE PRACTITIONER

## 2023-04-19 PROCEDURE — 83036 HEMOGLOBIN GLYCOSYLATED A1C: CPT | Performed by: NURSE PRACTITIONER

## 2023-04-19 PROCEDURE — 84443 ASSAY THYROID STIM HORMONE: CPT | Performed by: NURSE PRACTITIONER

## 2023-04-19 PROCEDURE — 80061 LIPID PANEL: CPT | Performed by: NURSE PRACTITIONER

## 2023-04-19 RX ORDER — ERGOCALCIFEROL 1.25 MG/1
50000 CAPSULE ORAL WEEKLY
Qty: 4 CAPSULE | Refills: 5 | Status: SHIPPED | OUTPATIENT
Start: 2023-04-19

## 2023-04-19 NOTE — ASSESSMENT & PLAN NOTE
Continue atenolol 25 mg and lisinopril 10 mg.  Encouraged to monitor blood pressure at home and report elevations.

## 2023-04-19 NOTE — PROGRESS NOTES
Subjective   Alfonso Barnett is a 52 y.o. male.     Chief Complaint   Patient presents with   • Hypertension     The patient presents today to follow up on hypertension. He states that he is feeling well and that his blood pressure is adequately controlled. He denies checking blood pressure at home. He is currently taking 25 mg of atenolol daily was well as lisinopril 10 mg daily. He denies any associated fatigue, dizziness, or headaches associated with blood pressure or medication. He intermittently experiences dizziness for a few seconds when standing up, but he denies this being the side effect of the medications. He denies any syncope episodes. He has no chest pain or heart palpitations.     Besides changes to anxiety medication with ADOLFO Barbour, he denies other changes to his medication regimen. He has been taking 25 mg of Elavil instead of 150 mg. He is also taking Zyprexa 5 mg. He reports doing well with his mood. He endorses insomnia, and there are nights where he can only sleep a maximum of 4 hours.     For cholesterol, the patient is doing well with simvastatin. He denies muscle pain, any leg pain, or increase in joint pain associated with the medication.     He denies taking vitamin D.  He reports he has not been receiving at the pharmacy.    The following portions of the patient's history were reviewed and updated as appropriate: CC, ROS, allergies, current medications, past family history, past medical history, past social history, past surgical history and problem list.    Review of Systems   Constitutional: Positive for fatigue. Negative for appetite change, unexpected weight gain and unexpected weight loss.   HENT: Negative for congestion, ear pain, postnasal drip, rhinorrhea, sore throat, swollen glands, trouble swallowing and voice change.    Eyes: Negative for pain and visual disturbance.   Respiratory: Negative for cough, chest tightness, shortness of breath and wheezing.    Cardiovascular:  "Negative for chest pain, palpitations and leg swelling.   Gastrointestinal: Negative for abdominal pain, blood in stool, constipation, diarrhea, nausea and indigestion.   Genitourinary: Negative for dysuria, hematuria and urgency.   Musculoskeletal: Positive for arthralgias and gait problem. Negative for back pain, joint swelling and myalgias.   Skin: Negative for color change and skin lesions.   Allergic/Immunologic: Negative.    Neurological: Negative for numbness, headache and confusion.   Hematological: Negative.    Psychiatric/Behavioral: Positive for sleep disturbance and stress. Negative for dysphoric mood and suicidal ideas. The patient is not nervous/anxious.    All other systems reviewed and are negative.      Objective     /82   Pulse 100   Temp 98.6 °F (37 °C)   Resp 20   Ht 177.8 cm (70\")   Wt 103 kg (226 lb 9.6 oz)   SpO2 99%   BMI 32.51 kg/m²     Physical Exam  Vitals reviewed.   Constitutional:       General: He is not in acute distress.     Appearance: He is well-developed. He is not diaphoretic.   HENT:      Head: Normocephalic and atraumatic.      Jaw: No tenderness.      Comments: Oropharynx not examined.  Patient is presently wearing a face covering/mask due to COVID-19 pandemic.     Right Ear: Hearing, tympanic membrane, ear canal and external ear normal.      Left Ear: Hearing, tympanic membrane, ear canal and external ear normal.   Eyes:      General: Lids are normal. No scleral icterus.     Extraocular Movements:      Right eye: Normal extraocular motion and no nystagmus.      Left eye: Normal extraocular motion and no nystagmus.      Conjunctiva/sclera: Conjunctivae normal.      Pupils: Pupils are equal, round, and reactive to light.   Neck:      Thyroid: No thyromegaly or thyroid tenderness.      Vascular: No carotid bruit or JVD.      Trachea: No tracheal tenderness.   Cardiovascular:      Rate and Rhythm: Normal rate and regular rhythm.      Pulses:           Dorsalis pedis " pulses are 2+ on the right side and 2+ on the left side.        Posterior tibial pulses are 2+ on the right side and 2+ on the left side.      Heart sounds: Normal heart sounds, S1 normal and S2 normal. No murmur heard.  Pulmonary:      Effort: Pulmonary effort is normal. No accessory muscle usage, prolonged expiration or respiratory distress.      Breath sounds: Normal breath sounds.   Chest:      Chest wall: No tenderness.   Abdominal:      General: Bowel sounds are normal. There is no distension.      Palpations: Abdomen is soft. There is no hepatomegaly, splenomegaly or mass.      Tenderness: There is no abdominal tenderness.   Musculoskeletal:         General: Tenderness present.      Cervical back: Normal range of motion and neck supple.      Right lower leg: No edema.      Left lower leg: No edema.      Comments: Generalized muscular tenderness    Lymphadenopathy:      Head:      Right side of head: No submental or submandibular adenopathy.      Left side of head: No submental or submandibular adenopathy.      Cervical: No cervical adenopathy.      Right cervical: No superficial cervical adenopathy.     Left cervical: No superficial cervical adenopathy.   Skin:     General: Skin is warm and dry.      Capillary Refill: Capillary refill takes less than 2 seconds.      Coloration: Skin is not jaundiced or pale.      Findings: No erythema.      Nails: There is no clubbing.   Neurological:      Mental Status: He is alert and oriented to person, place, and time.      Cranial Nerves: No cranial nerve deficit or facial asymmetry.      Sensory: No sensory deficit.      Motor: No weakness, tremor, atrophy or abnormal muscle tone.      Coordination: Coordination normal.      Gait: Gait abnormal (mild antalgia).      Deep Tendon Reflexes: Reflexes are normal and symmetric.   Psychiatric:         Attention and Perception: He is attentive.         Mood and Affect: Mood normal. Mood is not anxious or depressed.          Speech: Speech normal.         Behavior: Behavior normal. Behavior is cooperative.         Thought Content: Thought content normal.         Cognition and Memory: Cognition normal.         Judgment: Judgment normal.         Diagnoses and all orders for this visit:    1. Essential hypertension (Primary)  Assessment & Plan:  Continue atenolol 25 mg and lisinopril 10 mg.  Encouraged to monitor blood pressure at home and report elevations.    Orders:  -     CBC & Differential  -     Comprehensive Metabolic Panel  -     MicroAlbumin, Urine, Random - Urine, Clean Catch    2. Vitamin D deficiency  Assessment & Plan:  Continue vitamin D.  We will continue to monitor vitamin D levels.  Patient will follow-up with pharmacy regarding not receiving his vitamin D.  New prescription sent today    Orders:  -     Vitamin D,25-Hydroxy  -     vitamin D (ERGOCALCIFEROL) 1.25 MG (78961 UT) capsule capsule; Take 1 capsule by mouth 1 (One) Time Per Week.  Dispense: 4 capsule; Refill: 5    3. Vitamin B 12 deficiency  Assessment & Plan:  Continue vitamin B12.  We will continue to monitor lab levels      4. Mixed hyperlipidemia  Comments:  Continue Zocor as directed.  Encouraged a low-cholesterol diet  Orders:  -     Lipid Panel    5. Type 2 diabetes mellitus without complication, without long-term current use of insulin  Comments:  Currently not on medications.  We will continue to monitor A1c  Orders:  -     Hemoglobin A1c    6. Other fatigue  -     Vitamin B12    7. Anxiety disorder due to known physiological condition  -     TSH  -     T4, Free    8. Vitamin D deficiency  Comments:  updated Vit d level ordered today.  will resend vit D supplement to pharmacy    Assessment & Plan:  Continue vitamin D.  We will continue to monitor vitamin D levels.  Patient will follow-up with pharmacy regarding not receiving his vitamin D.  New prescription sent today    Orders:  -     Vitamin D,25-Hydroxy  -     vitamin D (ERGOCALCIFEROL) 1.25 MG (39041  UT) capsule capsule; Take 1 capsule by mouth 1 (One) Time Per Week.  Dispense: 4 capsule; Refill: 5    9. Drug-induced erectile dysfunction  Overview:  Due to psychotropic medication      Assessment & Plan:  Advised that sildenafil was sent to pharmacy last week.  He may  at any time            Understands disease processes and need for medications.  Understands reasons for urgent and emergent care.  Patient (& family) verbalized agreement for treatment plan.   Emotional support and active listening provided.  Patient provided time to verbalize feelings.    The patient will obtain fasting labs today. We will notify of results.   Continue all routine medications.   We will discuss with psychology nurse practitioner about patient's noncompliance with amitriptyline, and we will consider alternative medication.     The patient will return to the clinic in 3 months for follow-up of cholesterol and hypertension.          This document has been electronically signed by:  ADOLFO Magdaleno, [email or web address]    Transcribed from ambient dictation for ADOLFO Magdaleno by Lana Aguilar.  04/19/23   12:34 EDT    Patient or patient representative verbalized consent to the visit recording.  I have personally performed the services described in this document as transcribed by the above individual, and it is both accurate and complete.

## 2023-04-19 NOTE — ASSESSMENT & PLAN NOTE
Continue vitamin D.  We will continue to monitor vitamin D levels.  Patient will follow-up with pharmacy regarding not receiving his vitamin D.  New prescription sent today

## 2023-04-20 LAB
25(OH)D3 SERPL-MCNC: 35.2 NG/ML (ref 30–100)
ALBUMIN SERPL-MCNC: 4.7 G/DL (ref 3.5–5.2)
ALBUMIN UR-MCNC: <1.2 MG/DL
ALBUMIN/GLOB SERPL: 1.3 G/DL
ALP SERPL-CCNC: 108 U/L (ref 39–117)
ALT SERPL W P-5'-P-CCNC: 25 U/L (ref 1–41)
ANION GAP SERPL CALCULATED.3IONS-SCNC: 15 MMOL/L (ref 5–15)
AST SERPL-CCNC: 26 U/L (ref 1–40)
BASOPHILS # BLD AUTO: 0.06 10*3/MM3 (ref 0–0.2)
BASOPHILS NFR BLD AUTO: 0.9 % (ref 0–1.5)
BILIRUB SERPL-MCNC: 0.2 MG/DL (ref 0–1.2)
BUN SERPL-MCNC: 12 MG/DL (ref 6–20)
BUN/CREAT SERPL: 14 (ref 7–25)
CALCIUM SPEC-SCNC: 9.1 MG/DL (ref 8.6–10.5)
CHLORIDE SERPL-SCNC: 97 MMOL/L (ref 98–107)
CHOLEST SERPL-MCNC: 199 MG/DL (ref 0–200)
CO2 SERPL-SCNC: 23 MMOL/L (ref 22–29)
CREAT SERPL-MCNC: 0.86 MG/DL (ref 0.76–1.27)
DEPRECATED RDW RBC AUTO: 37.5 FL (ref 37–54)
EGFRCR SERPLBLD CKD-EPI 2021: 102.9 ML/MIN/1.73
EOSINOPHIL # BLD AUTO: 0.14 10*3/MM3 (ref 0–0.4)
EOSINOPHIL NFR BLD AUTO: 2.1 % (ref 0.3–6.2)
ERYTHROCYTE [DISTWIDTH] IN BLOOD BY AUTOMATED COUNT: 12.6 % (ref 12.3–15.4)
GLOBULIN UR ELPH-MCNC: 3.5 GM/DL
GLUCOSE SERPL-MCNC: 89 MG/DL (ref 65–99)
HBA1C MFR BLD: 6.2 % (ref 4.8–5.6)
HCT VFR BLD AUTO: 46.1 % (ref 37.5–51)
HDLC SERPL-MCNC: 48 MG/DL (ref 40–60)
HGB BLD-MCNC: 15.6 G/DL (ref 13–17.7)
IMM GRANULOCYTES # BLD AUTO: 0.01 10*3/MM3 (ref 0–0.05)
IMM GRANULOCYTES NFR BLD AUTO: 0.1 % (ref 0–0.5)
LDLC SERPL CALC-MCNC: 128 MG/DL (ref 0–100)
LDLC/HDLC SERPL: 2.6 {RATIO}
LYMPHOCYTES # BLD AUTO: 2.59 10*3/MM3 (ref 0.7–3.1)
LYMPHOCYTES NFR BLD AUTO: 38.7 % (ref 19.6–45.3)
MCH RBC QN AUTO: 27.6 PG (ref 26.6–33)
MCHC RBC AUTO-ENTMCNC: 33.8 G/DL (ref 31.5–35.7)
MCV RBC AUTO: 81.4 FL (ref 79–97)
MONOCYTES # BLD AUTO: 0.54 10*3/MM3 (ref 0.1–0.9)
MONOCYTES NFR BLD AUTO: 8.1 % (ref 5–12)
NEUTROPHILS NFR BLD AUTO: 3.35 10*3/MM3 (ref 1.7–7)
NEUTROPHILS NFR BLD AUTO: 50.1 % (ref 42.7–76)
NRBC BLD AUTO-RTO: 0 /100 WBC (ref 0–0.2)
PLATELET # BLD AUTO: 307 10*3/MM3 (ref 140–450)
PMV BLD AUTO: 9.3 FL (ref 6–12)
POTASSIUM SERPL-SCNC: 4 MMOL/L (ref 3.5–5.2)
PROT SERPL-MCNC: 8.2 G/DL (ref 6–8.5)
RBC # BLD AUTO: 5.66 10*6/MM3 (ref 4.14–5.8)
SODIUM SERPL-SCNC: 135 MMOL/L (ref 136–145)
T4 FREE SERPL-MCNC: 1.06 NG/DL (ref 0.93–1.7)
TRIGL SERPL-MCNC: 131 MG/DL (ref 0–150)
TSH SERPL DL<=0.05 MIU/L-ACNC: 1.9 UIU/ML (ref 0.27–4.2)
VIT B12 BLD-MCNC: 522 PG/ML (ref 211–946)
VLDLC SERPL-MCNC: 23 MG/DL (ref 5–40)
WBC NRBC COR # BLD: 6.69 10*3/MM3 (ref 3.4–10.8)

## 2023-07-25 ENCOUNTER — OFFICE VISIT (OUTPATIENT)
Dept: PSYCHIATRY | Facility: CLINIC | Age: 53
End: 2023-07-25
Payer: MEDICARE

## 2023-07-25 VITALS — OXYGEN SATURATION: 98 % | WEIGHT: 226.4 LBS | BODY MASS INDEX: 32.49 KG/M2 | HEART RATE: 90 BPM

## 2023-07-25 DIAGNOSIS — F06.4 ANXIETY DISORDER DUE TO KNOWN PHYSIOLOGICAL CONDITION: ICD-10-CM

## 2023-07-25 DIAGNOSIS — F20.0 PARANOID SCHIZOPHRENIA: ICD-10-CM

## 2023-07-25 PROCEDURE — 99214 OFFICE O/P EST MOD 30 MIN: CPT | Performed by: NURSE PRACTITIONER

## 2023-07-25 PROCEDURE — 1160F RVW MEDS BY RX/DR IN RCRD: CPT | Performed by: NURSE PRACTITIONER

## 2023-07-25 PROCEDURE — 1159F MED LIST DOCD IN RCRD: CPT | Performed by: NURSE PRACTITIONER

## 2023-07-25 RX ORDER — OLANZAPINE 5 MG/1
5 TABLET ORAL NIGHTLY
Qty: 30 TABLET | Refills: 1 | Status: SHIPPED | OUTPATIENT
Start: 2023-07-25 | End: 2023-07-31 | Stop reason: SDUPTHER

## 2023-07-25 RX ORDER — AMITRIPTYLINE HYDROCHLORIDE 50 MG/1
50 TABLET, FILM COATED ORAL
Qty: 30 TABLET | Refills: 1 | Status: SHIPPED | OUTPATIENT
Start: 2023-07-25

## 2023-07-25 NOTE — PROGRESS NOTES
Subjective   Alfonso Barnett is a 53 y.o. male is here today for medication management follow-up.    Chief Complaint:  schizophrenia     History of Present Illness:    Patient presents today for a follow up for medication management for schizophrenia. Patient states everything is going good. Denies any medical changes since last visit. Patient states not been taking the 100mg amitriptyline for the past month. Patient states still taking the zyprexa and amitriptyline 50mg. Patient states staying busy and doing a lot of outside work. Patient states sleep goes back and forth. Sleeping about 5 hours a night. Patient states sleep better when school is out due to not having to worry about getting up early. Denies any nightmares. Denies any paranoia. Depression at a 4-5 on a 0/10 scale with 10 the worst. Patient states some mood swings with occasional anger. Denies any aggression. Denies any panic attacks. Anxiety at a 6 on a 0/10 scale with 10 the worst. Denies any thoughts to harm self or others. Denies any auditory or visual hallucinations. Patient states still going to [employer] and getting out of the house. Denies any side effects.   The following portions of the patient's history were reviewed and updated as appropriate: allergies, current medications, past family history, past medical history, past social history, past surgical history, and problem list.    Review of Systems   Constitutional: Negative.    Respiratory: Negative.     Cardiovascular: Negative.    Gastrointestinal: Negative.    Neurological: Negative.    Psychiatric/Behavioral:  Positive for dysphoric mood and sleep disturbance. The patient is nervous/anxious.      Objective   Physical Exam  Vitals reviewed.   Constitutional:       Appearance: Normal appearance. He is well-developed and well-groomed.   Neurological:      Mental Status: He is alert.   Psychiatric:         Attention and Perception: Attention and perception normal.         Mood and Affect:  Affect normal. Mood is anxious.         Speech: Speech normal.         Behavior: Behavior normal. Behavior is cooperative.         Thought Content: Thought content normal.         Cognition and Memory: Cognition and memory normal.         Judgment: Judgment normal.     Pulse 90, weight 103 kg (226 lb 6.4 oz), SpO2 98 %.Body mass index is 32.49 kg/m².    No Known Allergies    Medication List:   Current Outpatient Medications   Medication Sig Dispense Refill    amitriptyline (ELAVIL) 50 MG tablet Take 1 tablet by mouth every night at bedtime. 30 tablet 1    aspirin 81 MG EC tablet Take 1 tablet by mouth Daily. 90 tablet 2    atenolol (TENORMIN) 25 MG tablet Take 1 tablet by mouth Daily. 30 tablet 5    chlorhexidine (PERIDEX) 0.12 % solution       Cyanocobalamin (Vitamin B12) 1000 MCG tablet controlled-release Take 1 tablet by mouth Daily. 30 tablet 5    lisinopril (PRINIVIL,ZESTRIL) 10 MG tablet Take 1 tablet by mouth Daily. 30 tablet 5    OLANZapine (zyPREXA) 5 MG tablet Take 1 tablet by mouth Every Night. 30 tablet 1    sildenafil (REVATIO) 20 MG tablet Take 1-2 tablets by mouth Daily As Needed (ED). 60 tablet 5    simvastatin (ZOCOR) 40 MG tablet Take 1 tablet by mouth Every Night. 30 tablet 5    vitamin D (ERGOCALCIFEROL) 1.25 MG (54556 UT) capsule capsule Take 1 capsule by mouth 1 (One) Time Per Week. 4 capsule 5    amitriptyline (ELAVIL) 100 MG tablet Take 1 tablet by mouth Every Night. (Patient not taking: Reported on 7/25/2023) 30 tablet 1     No current facility-administered medications for this visit.       Mental Status Exam:   Hygiene:   good  Cooperation:  Cooperative  Eye Contact:  Good  Psychomotor Behavior:  Appropriate  Affect:  Appropriate  Hopelessness: Denies  Speech:  Normal  Thought Process:  Goal directed and Linear  Thought Content:  Normal  Suicidal:  None  Homicidal:  None  Hallucinations:  None  Delusion:  None  Memory:  Intact  Orientation:  Person, Place, Time, and Situation  Reliability:   fair  Insight:  Fair  Judgement:  Fair  Impulse Control:  Fair  Physical/Medical Issues:  No            Assessment & Plan   Diagnoses and all orders for this visit:    1. Paranoid schizophrenia  -     OLANZapine (zyPREXA) 5 MG tablet; Take 1 tablet by mouth Every Night.  Dispense: 30 tablet; Refill: 1    2. Anxiety disorder due to known physiological condition  -     amitriptyline (ELAVIL) 50 MG tablet; Take 1 tablet by mouth every night at bedtime.  Dispense: 30 tablet; Refill: 1            Discussed medication options with patient. Discont. Amitriptyline. Cont. Amitriptyline 50mg daily at night for anxiety. Cont. Zyprexa 5mg daily at night for schizophrenia. Reviewed the risks, benefits, and side effects of the medications; patient acknowledged and verbally consented. Patient was instructed on medication side effects, benefits, and also of no treatment.  Patient was given an explanation regarding potential for increased risk of diabetes, lipids, and weight gain.  Labs will be assessed as clinically indicated.  Diet was discussed especially healthy diet choices and increasing activity and exercise.  Patient was strongly urged to continue weight maintance or weight loss efforts.  Patient reported verbalized understanding of instructions.  Discussed with patient will obtain lab work at next visit.    Patient is agreeable to call the office with any questions, concerns, or worsening of symptoms. Patient is aware to call 911 or go to the nearest ER should begin having SI/HI.          Follow up in two months          Errors in dictation may reflect use of voice recognition software and not all errors in transcription may have been detected prior to signing.              This document has been electronically signed by ADOLFO Barbour   July 25, 2023 12:00 EDT

## 2023-07-31 ENCOUNTER — OFFICE VISIT (OUTPATIENT)
Dept: FAMILY MEDICINE CLINIC | Facility: CLINIC | Age: 53
End: 2023-07-31
Payer: MEDICARE

## 2023-07-31 VITALS
WEIGHT: 218 LBS | SYSTOLIC BLOOD PRESSURE: 122 MMHG | OXYGEN SATURATION: 100 % | DIASTOLIC BLOOD PRESSURE: 74 MMHG | HEIGHT: 70 IN | TEMPERATURE: 98.4 F | RESPIRATION RATE: 18 BRPM | HEART RATE: 96 BPM | BODY MASS INDEX: 31.21 KG/M2

## 2023-07-31 DIAGNOSIS — E55.9 VITAMIN D DEFICIENCY: ICD-10-CM

## 2023-07-31 DIAGNOSIS — E53.8 VITAMIN B 12 DEFICIENCY: ICD-10-CM

## 2023-07-31 DIAGNOSIS — I10 ESSENTIAL HYPERTENSION: ICD-10-CM

## 2023-07-31 DIAGNOSIS — F20.0 PARANOID SCHIZOPHRENIA: ICD-10-CM

## 2023-07-31 DIAGNOSIS — E78.2 MIXED HYPERLIPIDEMIA: ICD-10-CM

## 2023-07-31 PROCEDURE — 1160F RVW MEDS BY RX/DR IN RCRD: CPT | Performed by: NURSE PRACTITIONER

## 2023-07-31 PROCEDURE — 1159F MED LIST DOCD IN RCRD: CPT | Performed by: NURSE PRACTITIONER

## 2023-07-31 PROCEDURE — 99214 OFFICE O/P EST MOD 30 MIN: CPT | Performed by: NURSE PRACTITIONER

## 2023-07-31 PROCEDURE — 3044F HG A1C LEVEL LT 7.0%: CPT | Performed by: NURSE PRACTITIONER

## 2023-07-31 PROCEDURE — 3074F SYST BP LT 130 MM HG: CPT | Performed by: NURSE PRACTITIONER

## 2023-07-31 PROCEDURE — 3078F DIAST BP <80 MM HG: CPT | Performed by: NURSE PRACTITIONER

## 2023-07-31 RX ORDER — SIMVASTATIN 40 MG
40 TABLET ORAL NIGHTLY
Qty: 30 TABLET | Refills: 5 | Status: SHIPPED | OUTPATIENT
Start: 2023-07-31

## 2023-07-31 RX ORDER — SILDENAFIL CITRATE 20 MG/1
20-40 TABLET ORAL DAILY PRN
Qty: 60 TABLET | Refills: 5 | Status: SHIPPED | OUTPATIENT
Start: 2023-07-31

## 2023-07-31 RX ORDER — LISINOPRIL 10 MG/1
10 TABLET ORAL DAILY
Qty: 30 TABLET | Refills: 5 | Status: SHIPPED | OUTPATIENT
Start: 2023-07-31

## 2023-07-31 RX ORDER — ERGOCALCIFEROL 1.25 MG/1
50000 CAPSULE ORAL WEEKLY
Qty: 4 CAPSULE | Refills: 5 | Status: SHIPPED | OUTPATIENT
Start: 2023-07-31

## 2023-07-31 RX ORDER — OLANZAPINE 5 MG/1
5 TABLET ORAL NIGHTLY
Qty: 30 TABLET | Refills: 1 | Status: SHIPPED | OUTPATIENT
Start: 2023-07-31

## 2023-07-31 RX ORDER — ATENOLOL 25 MG/1
25 TABLET ORAL DAILY
Qty: 30 TABLET | Refills: 5 | Status: SHIPPED | OUTPATIENT
Start: 2023-07-31

## 2023-08-15 ENCOUNTER — OFFICE VISIT (OUTPATIENT)
Dept: FAMILY MEDICINE CLINIC | Facility: CLINIC | Age: 53
End: 2023-08-15
Payer: MEDICARE

## 2023-08-15 VITALS
TEMPERATURE: 98.4 F | OXYGEN SATURATION: 98 % | HEART RATE: 90 BPM | DIASTOLIC BLOOD PRESSURE: 82 MMHG | WEIGHT: 225 LBS | HEIGHT: 70 IN | BODY MASS INDEX: 32.21 KG/M2 | SYSTOLIC BLOOD PRESSURE: 126 MMHG | RESPIRATION RATE: 18 BRPM

## 2023-08-15 DIAGNOSIS — R68.89 FLU-LIKE SYMPTOMS: ICD-10-CM

## 2023-08-15 DIAGNOSIS — J32.0 LEFT MAXILLARY SINUSITIS: Primary | ICD-10-CM

## 2023-08-15 LAB
FLUAV RNA RESP QL NAA+PROBE: NOT DETECTED
FLUBV RNA RESP QL NAA+PROBE: NOT DETECTED
SARS-COV-2 RNA RESP QL NAA+PROBE: NOT DETECTED

## 2023-08-15 PROCEDURE — 87636 SARSCOV2 & INF A&B AMP PRB: CPT | Performed by: NURSE PRACTITIONER

## 2023-08-15 PROCEDURE — 1160F RVW MEDS BY RX/DR IN RCRD: CPT | Performed by: NURSE PRACTITIONER

## 2023-08-15 PROCEDURE — 3044F HG A1C LEVEL LT 7.0%: CPT | Performed by: NURSE PRACTITIONER

## 2023-08-15 PROCEDURE — 1159F MED LIST DOCD IN RCRD: CPT | Performed by: NURSE PRACTITIONER

## 2023-08-15 PROCEDURE — 99213 OFFICE O/P EST LOW 20 MIN: CPT | Performed by: NURSE PRACTITIONER

## 2023-08-15 PROCEDURE — 3079F DIAST BP 80-89 MM HG: CPT | Performed by: NURSE PRACTITIONER

## 2023-08-15 PROCEDURE — 3074F SYST BP LT 130 MM HG: CPT | Performed by: NURSE PRACTITIONER

## 2023-08-15 RX ORDER — CETIRIZINE HYDROCHLORIDE 10 MG/1
10 TABLET ORAL DAILY PRN
Qty: 14 TABLET | Refills: 0 | COMMUNITY
Start: 2023-08-15

## 2023-08-15 RX ORDER — CEFDINIR 300 MG/1
300 CAPSULE ORAL 2 TIMES DAILY
Qty: 20 CAPSULE | Refills: 0 | Status: SHIPPED | OUTPATIENT
Start: 2023-08-15

## 2023-08-15 NOTE — PROGRESS NOTES
"Subjective   Alfonso Barnett is a 53 y.o. male.     Chief Complaint   Patient presents with    Headache    CONGESTION       Headache     URI symptoms-here for URI symptoms.  Has been present for at least 2 weeks.  Negative for covid on home test.  He reports HA  for at least a week.  He reports \"pressure\".  Congestion has been present.  He has some left frontal HA and pressure.  Occasional Rhinorrhea and that is how symptoms began.  Ear pain has resolved.  No sore throat currently.  He has not taken any meds at home other that some antibiotics from a previous RX that he had not completed.  No dizziness.  No fever.      The following portions of the patient's history were reviewed and updated as appropriate: CC, ROS, allergies, current medications, past family history, past medical history, past social history, past surgical history and problem list.      Review of Systems   Constitutional:  Positive for fatigue. Negative for appetite change, chills, unexpected weight gain and unexpected weight loss.   HENT:  Positive for congestion and sinus pressure. Negative for ear pain, postnasal drip, rhinorrhea, sore throat, swollen glands, trouble swallowing and voice change.    Eyes:  Negative for pain and visual disturbance.   Respiratory:  Negative for cough, chest tightness, shortness of breath and wheezing.    Cardiovascular:  Negative for chest pain and palpitations.   Gastrointestinal:  Negative for abdominal pain, blood in stool, constipation, diarrhea, nausea and indigestion.   Genitourinary:  Negative for dysuria, hematuria and urgency.   Musculoskeletal:  Negative for arthralgias and back pain.   Skin:  Negative for color change and skin lesions.   Allergic/Immunologic: Negative.    Neurological:  Positive for headache. Negative for dizziness and numbness.   Hematological: Negative.    Psychiatric/Behavioral:  Negative for sleep disturbance and suicidal ideas.    All other systems reviewed and are " "negative.    Objective     /82   Pulse 90   Temp 98.4 øF (36.9 øC)   Resp 18   Ht 177.8 cm (70\")   Wt 102 kg (225 lb)   SpO2 98%   BMI 32.28 kg/mý     Physical Exam  Vitals reviewed.   Constitutional:       General: He is not in acute distress.     Appearance: He is well-developed. He is obese. He is not diaphoretic.   HENT:      Head: Normocephalic and atraumatic.      Jaw: No tenderness.      Right Ear: Hearing, tympanic membrane, ear canal and external ear normal. No tenderness. Tympanic membrane is injected. Tympanic membrane is not erythematous or retracted.      Left Ear: Hearing, tympanic membrane, ear canal and external ear normal. No tenderness. Tympanic membrane is injected. Tympanic membrane is not erythematous or retracted.      Nose: Nose normal. Congestion present.      Right Sinus: No maxillary sinus tenderness or frontal sinus tenderness.      Left Sinus: Maxillary sinus tenderness and frontal sinus tenderness present.      Mouth/Throat:      Mouth: Mucous membranes are moist.      Pharynx: Posterior oropharyngeal erythema (mild with thin PND noted) present.   Eyes:      General: Lids are normal. No scleral icterus.     Extraocular Movements:      Right eye: Normal extraocular motion and no nystagmus.      Left eye: Normal extraocular motion and no nystagmus.      Conjunctiva/sclera: Conjunctivae normal.      Pupils: Pupils are equal, round, and reactive to light.   Neck:      Thyroid: No thyromegaly or thyroid tenderness.      Vascular: No carotid bruit or JVD.      Trachea: No tracheal tenderness.   Cardiovascular:      Rate and Rhythm: Normal rate and regular rhythm.      Pulses:           Dorsalis pedis pulses are 2+ on the right side and 2+ on the left side.        Posterior tibial pulses are 2+ on the right side and 2+ on the left side.      Heart sounds: Normal heart sounds, S1 normal and S2 normal. No murmur heard.  Pulmonary:      Effort: Pulmonary effort is normal. No accessory " muscle usage, prolonged expiration or respiratory distress.      Breath sounds: Normal breath sounds.   Chest:      Chest wall: No tenderness.   Abdominal:      General: Bowel sounds are normal. There is no distension.      Palpations: Abdomen is soft. There is no hepatomegaly, splenomegaly or mass.      Tenderness: There is no abdominal tenderness.   Musculoskeletal:         General: Tenderness present.      Cervical back: Normal range of motion and neck supple.      Right lower leg: No edema.      Left lower leg: No edema.      Comments: Generalized muscular tenderness    Lymphadenopathy:      Head:      Right side of head: No submental or submandibular adenopathy.      Left side of head: No submental or submandibular adenopathy.      Cervical: No cervical adenopathy.      Right cervical: No superficial cervical adenopathy.     Left cervical: No superficial cervical adenopathy.   Skin:     General: Skin is warm and dry.      Capillary Refill: Capillary refill takes less than 2 seconds.      Coloration: Skin is not jaundiced or pale.      Findings: No erythema.      Nails: There is no clubbing.   Neurological:      Mental Status: He is alert and oriented to person, place, and time.      Cranial Nerves: No cranial nerve deficit or facial asymmetry.      Sensory: No sensory deficit.      Motor: No weakness, tremor, atrophy or abnormal muscle tone.      Coordination: Coordination normal.      Gait: Gait abnormal (mild antalgia).      Deep Tendon Reflexes: Reflexes are normal and symmetric.   Psychiatric:         Attention and Perception: He is attentive.         Mood and Affect: Mood normal. Mood is not anxious or depressed.         Speech: Speech normal.         Behavior: Behavior normal. Behavior is cooperative.         Thought Content: Thought content normal.         Cognition and Memory: Cognition normal.         Judgment: Judgment normal.       Diagnoses and all orders for this visit:    1. Left maxillary  sinusitis (Primary)  -     cefdinir (OMNICEF) 300 MG capsule; Take 1 capsule by mouth 2 (Two) Times a Day.  Dispense: 20 capsule; Refill: 0  -     cetirizine (zyrTEC) 10 MG tablet; Take 1 tablet by mouth Daily As Needed for Allergies. Lot [id number] EXP 04/2025  Dispense: 14 tablet; Refill: 0    2. Flu-like symptoms  -     COVID-19 and FLU A/B PCR - Swab, Nasopharynx; Future  -     COVID-19 and FLU A/B PCR - Swab, Nasopharynx       Understands disease processes and need for medications.  Understands reasons for urgent and emergent care.  Patient (& family) verbalized agreement for treatment plan.   Emotional support and active listening provided.  Patient provided time to verbalize feelings.    Instructed to complete all of antibiotics for acute illness.  Increase PO fluids, avoid/limit caffeine.  Do not save any of the meds for later use.  Rest PRN  Warm compress/soaks to affected area of pain/tenderness QID at least 20 minutes each session.     RTC PRN 3-5 days for worsening or non resolving symptoms          This document has been electronically signed by:  ADOLFO Magdaleno, FNP-C    Dragon disclaimer:  Part of this note may be an electronic transcription/translation of spoken language to printed text using the Dragon Dictation System.

## 2023-08-21 DIAGNOSIS — E78.2 MIXED HYPERLIPIDEMIA: ICD-10-CM

## 2023-08-21 DIAGNOSIS — F06.4 ANXIETY DISORDER DUE TO KNOWN PHYSIOLOGICAL CONDITION: ICD-10-CM

## 2023-08-21 RX ORDER — SIMVASTATIN 40 MG
40 TABLET ORAL NIGHTLY
Qty: 30 TABLET | Refills: 5 | OUTPATIENT
Start: 2023-08-21

## 2023-08-22 RX ORDER — AMITRIPTYLINE HYDROCHLORIDE 50 MG/1
50 TABLET, FILM COATED ORAL
Qty: 30 TABLET | Refills: 0 | Status: SHIPPED | OUTPATIENT
Start: 2023-08-22

## 2023-09-28 ENCOUNTER — OFFICE VISIT (OUTPATIENT)
Dept: PSYCHIATRY | Facility: CLINIC | Age: 53
End: 2023-09-28
Payer: MEDICARE

## 2023-09-28 VITALS — HEART RATE: 64 BPM | BODY MASS INDEX: 31.85 KG/M2 | OXYGEN SATURATION: 98 % | WEIGHT: 222 LBS

## 2023-09-28 DIAGNOSIS — F06.4 ANXIETY DISORDER DUE TO KNOWN PHYSIOLOGICAL CONDITION: ICD-10-CM

## 2023-09-28 DIAGNOSIS — F20.0 PARANOID SCHIZOPHRENIA: Primary | ICD-10-CM

## 2023-09-28 RX ORDER — OLANZAPINE 5 MG/1
5 TABLET ORAL NIGHTLY
Qty: 30 TABLET | Refills: 1 | Status: SHIPPED | OUTPATIENT
Start: 2023-09-28

## 2023-09-28 RX ORDER — AMITRIPTYLINE HYDROCHLORIDE 50 MG/1
50 TABLET, FILM COATED ORAL
Qty: 30 TABLET | Refills: 1 | Status: SHIPPED | OUTPATIENT
Start: 2023-09-28

## 2023-09-28 NOTE — PROGRESS NOTES
Subjective   Alfonso Barnett is a 53 y.o. male is here today for medication management follow-up.    Chief Complaint:  schizophrenia     History of Present Illness:    Patient presents today for a follow up for medication management for schizophrenia and anxiety. Patient states everything is going good. Patient states keeping up with everything with kids. Patient states he got full custody of grand kids now. Patient states he is anxious about daughter getting out in February. Patient states mood has been doing good. Patient states depression at a 0 on a 0/10 scale with 10 the worst. Anxiety at a 0 on a 0/10 scale with 10 the worst. Denies any panic attacks. Patient states not sleeping much and unable to fall asleep. Sleeping about 4-5 hours a night. Patient states he will sleep a couple hours then waking up throughout the night. Patient states he is feeling bad and tired during the day when don't sleep. Denies any nightmares. Appetite is good and eating at least twice daily. Denies any thoughts to harm self or others. Denies any paranoia. Denies any auditory or visual hallucinations. Denies any medical changes since last visit. Denies any side effects to medications.    The following portions of the patient's history were reviewed and updated as appropriate: allergies, current medications, past family history, past medical history, past social history, past surgical history, and problem list.    Review of Systems   Constitutional: Negative.    Respiratory: Negative.     Cardiovascular: Negative.    Gastrointestinal: Negative.    Neurological: Negative.    Psychiatric/Behavioral:  Positive for sleep disturbance.        Objective   Physical Exam  Vitals reviewed.   Constitutional:       Appearance: Normal appearance. He is well-developed and well-groomed.   Neurological:      Mental Status: He is alert.   Psychiatric:         Attention and Perception: Attention and perception normal.         Mood and Affect: Affect  normal. Mood is anxious.         Speech: Speech normal.         Behavior: Behavior normal. Behavior is cooperative.         Thought Content: Thought content normal.         Cognition and Memory: Cognition and memory normal.         Judgment: Judgment normal.       Pulse 64, weight 101 kg (222 lb), SpO2 98%.Body mass index is 31.85 kg/mý.    No Known Allergies    Medication List:   Current Outpatient Medications   Medication Sig Dispense Refill    amitriptyline (ELAVIL) 50 MG tablet Take 1 tablet by mouth every night at bedtime. 30 tablet 1    OLANZapine (zyPREXA) 5 MG tablet Take 1 tablet by mouth Every Night. 30 tablet 1    aspirin 81 MG EC tablet Take 1 tablet by mouth Daily. 90 tablet 2    atenolol (TENORMIN) 25 MG tablet Take 1 tablet by mouth Daily. 30 tablet 5    cefdinir (OMNICEF) 300 MG capsule Take 1 capsule by mouth 2 (Two) Times a Day. 20 capsule 0    cetirizine (zyrTEC) 10 MG tablet Take 1 tablet by mouth Daily As Needed for Allergies. Lot [id number] EXP 04/2025 14 tablet 0    chlorhexidine (PERIDEX) 0.12 % solution       Cyanocobalamin (Vitamin B12) 1000 MCG tablet controlled-release Take 1 tablet by mouth Daily. 30 tablet 5    lisinopril (PRINIVIL,ZESTRIL) 10 MG tablet Take 1 tablet by mouth Daily. 30 tablet 5    sildenafil (REVATIO) 20 MG tablet Take 1-2 tablets by mouth Daily As Needed (ED). 60 tablet 5    simvastatin (ZOCOR) 40 MG tablet Take 1 tablet by mouth Every Night. 30 tablet 5    vitamin D (ERGOCALCIFEROL) 1.25 MG (18981 UT) capsule capsule Take 1 capsule by mouth 1 (One) Time Per Week. 4 capsule 5     No current facility-administered medications for this visit.       Mental Status Exam:   Hygiene:   good  Cooperation:  Cooperative  Eye Contact:  Good  Psychomotor Behavior:  Appropriate  Affect:  Appropriate  Hopelessness: Denies  Speech:  Normal  Thought Process:  Goal directed and Linear  Thought Content:  Normal  Suicidal:  None  Homicidal:  None  Hallucinations:  None  Delusion:   None  Memory:  Intact  Orientation:  Person, Place, Time, and Situation  Reliability:  fair  Insight:  Fair  Judgement:  Fair  Impulse Control:  Fair  Physical/Medical Issues:  No                Assessment & Plan   Diagnoses and all orders for this visit:    1. Paranoid schizophrenia (Primary)  -     OLANZapine (zyPREXA) 5 MG tablet; Take 1 tablet by mouth Every Night.  Dispense: 30 tablet; Refill: 1    2. Anxiety disorder due to known physiological condition  -     amitriptyline (ELAVIL) 50 MG tablet; Take 1 tablet by mouth every night at bedtime.  Dispense: 30 tablet; Refill: 1            Discussed medication options with patient. Cont. Zyprexa 5mg daily at night for schizophrenia. Cont. Elavil 50 mg daily at night for anxiety. Reviewed the risks, benefits, and side effects of the medications; patient acknowledged and verbally consented.    Patient was instructed on medication side effects, benefits, and also of no treatment.  Patient was given an explanation regarding potential for increased risk of diabetes, lipids, and weight gain.  Labs will be assessed as clinically indicated.  Diet was discussed especially healthy diet choices and increasing activity and exercise.  Patient was strongly urged to continue weight maintance or weight loss efforts.  Patient reported verbalized understanding of instructions.  Patient is agreeable to call the office with any questions, concerns, or worsening of symptoms. Patient is aware to call 911 or go to the nearest ER should begin having thoughts to harm self or others.        Follow up in two months        Errors in dictation may reflect use of voice recognition software and not all errors in transcription may have been detected prior to signing.              This document has been electronically signed by ADOLFO Barbour   October 12, 2023 16:22 EDT

## 2023-10-18 DIAGNOSIS — E78.2 MIXED HYPERLIPIDEMIA: ICD-10-CM

## 2023-10-18 DIAGNOSIS — I10 ESSENTIAL HYPERTENSION: ICD-10-CM

## 2023-10-18 DIAGNOSIS — F06.4 ANXIETY DISORDER DUE TO KNOWN PHYSIOLOGICAL CONDITION: ICD-10-CM

## 2023-10-18 RX ORDER — ATENOLOL 25 MG/1
25 TABLET ORAL DAILY
Qty: 30 TABLET | Refills: 5 | Status: SHIPPED | OUTPATIENT
Start: 2023-10-18

## 2023-10-18 RX ORDER — LISINOPRIL 10 MG/1
10 TABLET ORAL DAILY
Qty: 30 TABLET | Refills: 5 | Status: SHIPPED | OUTPATIENT
Start: 2023-10-18

## 2023-10-18 RX ORDER — SIMVASTATIN 40 MG
40 TABLET ORAL NIGHTLY
Qty: 30 TABLET | Refills: 5 | Status: SHIPPED | OUTPATIENT
Start: 2023-10-18

## 2023-10-19 RX ORDER — AMITRIPTYLINE HYDROCHLORIDE 50 MG/1
50 TABLET, FILM COATED ORAL
Qty: 30 TABLET | Refills: 0 | Status: SHIPPED | OUTPATIENT
Start: 2023-10-19

## 2023-10-25 DIAGNOSIS — F20.0 PARANOID SCHIZOPHRENIA: ICD-10-CM

## 2023-10-26 RX ORDER — OLANZAPINE 5 MG/1
5 TABLET ORAL NIGHTLY
Qty: 30 TABLET | Refills: 2 | Status: SHIPPED | OUTPATIENT
Start: 2023-10-26

## 2024-01-12 ENCOUNTER — OFFICE VISIT (OUTPATIENT)
Dept: FAMILY MEDICINE CLINIC | Facility: CLINIC | Age: 54
End: 2024-01-12
Payer: MEDICARE

## 2024-01-12 VITALS
HEART RATE: 87 BPM | HEIGHT: 70 IN | WEIGHT: 226 LBS | BODY MASS INDEX: 32.35 KG/M2 | OXYGEN SATURATION: 98 % | SYSTOLIC BLOOD PRESSURE: 130 MMHG | TEMPERATURE: 96.9 F | DIASTOLIC BLOOD PRESSURE: 80 MMHG

## 2024-01-12 DIAGNOSIS — I79.8 OTHER DISORDERS OF ARTERIES, ARTERIOLES AND CAPILLARIES IN DISEASES CLASSIFIED ELSEWHERE: Chronic | ICD-10-CM

## 2024-01-12 DIAGNOSIS — E55.9 VITAMIN D DEFICIENCY: ICD-10-CM

## 2024-01-12 DIAGNOSIS — E78.5 DYSLIPIDEMIA: Chronic | ICD-10-CM

## 2024-01-12 DIAGNOSIS — E55.9 VITAMIN D DEFICIENCY: Chronic | ICD-10-CM

## 2024-01-12 DIAGNOSIS — Z23 ENCOUNTER FOR IMMUNIZATION: ICD-10-CM

## 2024-01-12 DIAGNOSIS — I10 ESSENTIAL HYPERTENSION: Primary | Chronic | ICD-10-CM

## 2024-01-12 DIAGNOSIS — R73.9 BLOOD GLUCOSE ELEVATED: ICD-10-CM

## 2024-01-12 DIAGNOSIS — R53.83 OTHER FATIGUE: ICD-10-CM

## 2024-01-12 DIAGNOSIS — E53.8 VITAMIN B 12 DEFICIENCY: Chronic | ICD-10-CM

## 2024-01-12 DIAGNOSIS — J30.9 ALLERGIC RHINITIS, UNSPECIFIED SEASONALITY, UNSPECIFIED TRIGGER: ICD-10-CM

## 2024-01-12 PROCEDURE — 82607 VITAMIN B-12: CPT | Performed by: NURSE PRACTITIONER

## 2024-01-12 PROCEDURE — 82306 VITAMIN D 25 HYDROXY: CPT | Performed by: NURSE PRACTITIONER

## 2024-01-12 PROCEDURE — 80061 LIPID PANEL: CPT | Performed by: NURSE PRACTITIONER

## 2024-01-12 PROCEDURE — 84443 ASSAY THYROID STIM HORMONE: CPT | Performed by: NURSE PRACTITIONER

## 2024-01-12 PROCEDURE — 85025 COMPLETE CBC W/AUTO DIFF WBC: CPT | Performed by: NURSE PRACTITIONER

## 2024-01-12 PROCEDURE — 83036 HEMOGLOBIN GLYCOSYLATED A1C: CPT | Performed by: NURSE PRACTITIONER

## 2024-01-12 PROCEDURE — 80053 COMPREHEN METABOLIC PANEL: CPT | Performed by: NURSE PRACTITIONER

## 2024-01-12 RX ORDER — ATENOLOL 25 MG/1
25 TABLET ORAL DAILY
Qty: 30 TABLET | Refills: 0 | Status: SHIPPED | OUTPATIENT
Start: 2024-01-12

## 2024-01-12 RX ORDER — ASPIRIN 81 MG/1
81 TABLET ORAL DAILY
Qty: 90 TABLET | Refills: 0 | Status: SHIPPED | OUTPATIENT
Start: 2024-01-12

## 2024-01-12 RX ORDER — SIMVASTATIN 40 MG
40 TABLET ORAL NIGHTLY
Qty: 30 TABLET | Refills: 0 | Status: SHIPPED | OUTPATIENT
Start: 2024-01-12

## 2024-01-12 RX ORDER — LISINOPRIL 10 MG/1
10 TABLET ORAL DAILY
Qty: 30 TABLET | Refills: 0 | Status: SHIPPED | OUTPATIENT
Start: 2024-01-12

## 2024-01-12 RX ORDER — CETIRIZINE HYDROCHLORIDE 10 MG/1
10 TABLET ORAL DAILY PRN
Qty: 30 TABLET | Refills: 0 | Status: SHIPPED | OUTPATIENT
Start: 2024-01-12

## 2024-01-12 NOTE — PROGRESS NOTES
History of Present Illness  Alfonso Barnett is a 53 y.o. male who presents to the clinic today pertaining to his medical problems which include hypertension, Dyslipidemia and vitamin deficiencies.  He was unable to keep his follow-up appointment with his PCP and is needing refills on his medications today.  In addition, he is complaining of upper respiratory symptoms which started over a week ago.    Hypertension  He is currently prescribed atenolol 25 mg and lisinopril 10 mg.  In addition, he is taking aspirin 81 mg daily with no concerns for bleeding or bruising.  He is tolerating these well.  Risk factors for coronary artery disease include dyslipidemia and male gender. Current antihypertension treatment includes ACE inhibitors.     Dyslipidemia  Prescribed simvastatin 40 mg.  Reports he is tolerating well with no side effects risk factors for coronary artery disease include dyslipidemia, hypertension and male sex.     Mental health issues  Currently under the care of of the [facility] provider.  He is currently on Zyprexa 5 mg nightly and Elavil 50 mg.  He reports his mood and behaviors are stable.    Upper Respiratory Symptoms  The current episode started 1 to 4 weeks ago. Associated symptoms include tactile fever, congestion,and rhinorrhea.     The following portions of the patient's history were reviewed and updated as appropriate: allergies, current medications, past family history, past medical history, past social history, past surgical history and problem list.    Review of Systems   Constitutional:  Positive for fatigue. Negative for activity change, appetite change, chills, fever and unexpected weight change.   HENT:  Positive for congestion, postnasal drip and rhinorrhea. Negative for ear discharge, ear pain, nosebleeds, sinus pressure, sinus pain, sore throat, tinnitus and trouble swallowing.    Eyes:  Negative for pain, discharge, redness, itching and visual disturbance.   Respiratory:  Positive  "for cough. Negative for shortness of breath and wheezing.    Cardiovascular:  Negative for chest pain, palpitations and leg swelling.   Gastrointestinal:  Negative for nausea and vomiting.   Endocrine: Negative for cold intolerance, heat intolerance, polydipsia, polyphagia and polyuria.   Skin:  Negative for color change and rash.   Allergic/Immunologic: Positive for environmental allergies.   Neurological:  Positive for headaches. Negative for dizziness, tremors, speech difficulty, weakness and light-headedness.   Hematological:  Negative for adenopathy.   Psychiatric/Behavioral:  Positive for sleep disturbance (Stable with medication). Negative for confusion and decreased concentration. The patient is not nervous/anxious.    All other systems reviewed and are negative.    Vital signs:  /80   Pulse 87   Temp 96.9 °F (36.1 °C) (Temporal)   Ht 177.8 cm (70\")   Wt 103 kg (226 lb)   SpO2 98%   BMI 32.43 kg/m²     Physical Exam  Vitals and nursing note reviewed.   Constitutional:       General: He is not in acute distress.     Appearance: He is well-developed.   HENT:      Head: Normocephalic.      Right Ear: No tenderness. A middle ear effusion is present. Tympanic membrane is not erythematous or bulging.      Left Ear: No tenderness. A middle ear effusion is present. Tympanic membrane is not erythematous or bulging.      Nose: Nose normal. Rhinorrhea present.      Right Turbinates: Swollen.      Left Turbinates: Swollen.      Right Sinus: No maxillary sinus tenderness or frontal sinus tenderness.      Left Sinus: No maxillary sinus tenderness or frontal sinus tenderness.      Mouth/Throat:      Mouth: Mucous membranes are moist.   Eyes:      General:         Right eye: No discharge.         Left eye: No discharge.      Conjunctiva/sclera: Conjunctivae normal.      Pupils: Pupils are equal, round, and reactive to light.   Neck:      Vascular: No JVD.   Cardiovascular:      Rate and Rhythm: Normal rate and " regular rhythm.      Heart sounds: Normal heart sounds. No murmur heard.     No friction rub.   Pulmonary:      Effort: Pulmonary effort is normal. No respiratory distress.      Breath sounds: Normal breath sounds. No wheezing or rales.   Abdominal:      General: Bowel sounds are normal. There is no distension.      Palpations: Abdomen is soft.      Tenderness: There is no abdominal tenderness. There is no guarding or rebound.   Musculoskeletal:         General: No tenderness.      Cervical back: Neck supple.   Lymphadenopathy:      Cervical: No cervical adenopathy.   Skin:     General: Skin is warm and dry.      Findings: No erythema or rash.   Neurological:      Mental Status: He is alert and oriented to person, place, and time.   Psychiatric:         Mood and Affect: Mood and affect normal.         Speech: Speech normal.         Behavior: Behavior is cooperative.         Thought Content: Thought content normal.     BMI is >= 30 and <35. (Class 1 Obesity). The following options were offered after discussion;: weight loss educational material (shared in after visit summary)      Assessment & Plan     Diagnoses and all orders for this visit:    1. Essential hypertension (Primary)  Comments:  Continue lisinopril and atenolol, weight loss recommended  Orders:  -     atenolol (TENORMIN) 25 MG tablet; Take 1 tablet by mouth Daily.  Dispense: 30 tablet; Refill: 0  -     lisinopril (PRINIVIL,ZESTRIL) 10 MG tablet; Take 1 tablet by mouth Daily.  Dispense: 30 tablet; Refill: 0  -     Comprehensive Metabolic Panel  -     Lipid Panel  -     TSH    2. Dyslipidemia  Comments:  Continue statin  updated lipid panel ordered.  Orders:  -     simvastatin (ZOCOR) 40 MG tablet; Take 1 tablet by mouth Every Night.  Dispense: 30 tablet; Refill: 0  -     Comprehensive Metabolic Panel  -     Lipid Panel  -     TSH    3. 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  Dispense: 90 tablet; Refill: 0    4. Vitamin D deficiency  Comments:  Will check vitamin D level today  Orders:  -     Vitamin D,25-Hydroxy  -     vitamin D (ERGOCALCIFEROL) 1.25 MG (46398 UT) capsule capsule; Take 1 capsule by mouth 1 (One) Time Per Week.  Dispense: 4 capsule; Refill: 5    5. Vitamin B 12 deficiency  Comments:  Will check vitamin b 12 level today  Orders:  -     Vitamin B12    6. Allergic rhinitis, unspecified seasonality, unspecified trigger  Comments:  Cetirizine refilled  Orders:  -     cetirizine (zyrTEC) 10 MG tablet; Take 1 tablet by mouth Daily As Needed for Allergies. 0  Dispense: 30 tablet; Refill: 0    7. Other fatigue  Comments:  Will check CBC and thyroid today  Orders:  -     CBC & Differential    8. Blood glucose elevated  Comments:  Positive family history of diabetes.  Will check A1c  Orders:  -     Hemoglobin A1c    9. Encounter for immunization  Comments:  Influenza vaccination given today  Orders:  -     Fluzone (or Fluarix & Flulaval for VFC) >6 Mos ([id number])    10. Vitamin D deficiency  Comments:  updated Vit d level ordered today.  will resend vit D supplement to pharmacy    Orders:  -     Vitamin D,25-Hydroxy  -     vitamin D (ERGOCALCIFEROL) 1.25 MG (39876 UT) capsule capsule; Take 1 capsule by mouth 1 (One) Time Per Week.  Dispense: 4 capsule; Refill: 5    Follow Up will follow-up with his PCP in February  Findings and recommendations discussed with Alfonso. Reviewed treatment options.  I will go on and order his routine labs today.  These will be reviewed with him at his follow-up appointment unless there are any critical findings.  Lifestyle modifications reinforced including nutrition and activity recommendations.  Alfonso will follow up in February with his PCP, ADOLFO Osorio, sooner if problems/concerns occur. Alfonso was given instructions and counseling regarding his condition or for health  maintenance advice. Please see specific information pulled into the AVS if appropriate      This document has been electronically signed by:

## 2024-01-13 LAB
25(OH)D3 SERPL-MCNC: 39.8 NG/ML (ref 30–100)
ALBUMIN SERPL-MCNC: 4.7 G/DL (ref 3.5–5.2)
ALBUMIN/GLOB SERPL: 1.5 G/DL
ALP SERPL-CCNC: 99 U/L (ref 39–117)
ALT SERPL W P-5'-P-CCNC: 18 U/L (ref 1–41)
ANION GAP SERPL CALCULATED.3IONS-SCNC: 16.8 MMOL/L (ref 5–15)
AST SERPL-CCNC: 18 U/L (ref 1–40)
BASOPHILS # BLD AUTO: 0.06 10*3/MM3 (ref 0–0.2)
BASOPHILS NFR BLD AUTO: 0.7 % (ref 0–1.5)
BILIRUB SERPL-MCNC: 0.2 MG/DL (ref 0–1.2)
BUN SERPL-MCNC: 13 MG/DL (ref 6–20)
BUN/CREAT SERPL: 14.4 (ref 7–25)
CALCIUM SPEC-SCNC: 10 MG/DL (ref 8.6–10.5)
CHLORIDE SERPL-SCNC: 98 MMOL/L (ref 98–107)
CHOLEST SERPL-MCNC: 216 MG/DL (ref 0–200)
CO2 SERPL-SCNC: 21.2 MMOL/L (ref 22–29)
CREAT SERPL-MCNC: 0.9 MG/DL (ref 0.76–1.27)
DEPRECATED RDW RBC AUTO: 37.6 FL (ref 37–54)
EGFRCR SERPLBLD CKD-EPI 2021: 100.9 ML/MIN/1.73
EOSINOPHIL # BLD AUTO: 0.24 10*3/MM3 (ref 0–0.4)
EOSINOPHIL NFR BLD AUTO: 2.9 % (ref 0.3–6.2)
ERYTHROCYTE [DISTWIDTH] IN BLOOD BY AUTOMATED COUNT: 13.1 % (ref 12.3–15.4)
GLOBULIN UR ELPH-MCNC: 3.2 GM/DL
GLUCOSE SERPL-MCNC: 88 MG/DL (ref 65–99)
HBA1C MFR BLD: 6 % (ref 4.8–5.6)
HCT VFR BLD AUTO: 47 % (ref 37.5–51)
HDLC SERPL-MCNC: 42 MG/DL (ref 40–60)
HGB BLD-MCNC: 15.5 G/DL (ref 13–17.7)
IMM GRANULOCYTES # BLD AUTO: 0.02 10*3/MM3 (ref 0–0.05)
IMM GRANULOCYTES NFR BLD AUTO: 0.2 % (ref 0–0.5)
LDLC SERPL CALC-MCNC: 126 MG/DL (ref 0–100)
LDLC/HDLC SERPL: 2.84 {RATIO}
LYMPHOCYTES # BLD AUTO: 3.31 10*3/MM3 (ref 0.7–3.1)
LYMPHOCYTES NFR BLD AUTO: 39.4 % (ref 19.6–45.3)
MCH RBC QN AUTO: 26.2 PG (ref 26.6–33)
MCHC RBC AUTO-ENTMCNC: 33 G/DL (ref 31.5–35.7)
MCV RBC AUTO: 79.4 FL (ref 79–97)
MONOCYTES # BLD AUTO: 0.63 10*3/MM3 (ref 0.1–0.9)
MONOCYTES NFR BLD AUTO: 7.5 % (ref 5–12)
NEUTROPHILS NFR BLD AUTO: 4.15 10*3/MM3 (ref 1.7–7)
NEUTROPHILS NFR BLD AUTO: 49.3 % (ref 42.7–76)
NRBC BLD AUTO-RTO: 0 /100 WBC (ref 0–0.2)
PLATELET # BLD AUTO: 404 10*3/MM3 (ref 140–450)
PMV BLD AUTO: 8.8 FL (ref 6–12)
POTASSIUM SERPL-SCNC: 4.6 MMOL/L (ref 3.5–5.2)
PROT SERPL-MCNC: 7.9 G/DL (ref 6–8.5)
RBC # BLD AUTO: 5.92 10*6/MM3 (ref 4.14–5.8)
SODIUM SERPL-SCNC: 136 MMOL/L (ref 136–145)
TRIGL SERPL-MCNC: 274 MG/DL (ref 0–150)
TSH SERPL DL<=0.05 MIU/L-ACNC: 1.34 UIU/ML (ref 0.27–4.2)
VIT B12 BLD-MCNC: 481 PG/ML (ref 211–946)
VLDLC SERPL-MCNC: 48 MG/DL (ref 5–40)
WBC NRBC COR # BLD AUTO: 8.41 10*3/MM3 (ref 3.4–10.8)

## 2024-01-13 RX ORDER — ERGOCALCIFEROL 1.25 MG/1
50000 CAPSULE ORAL WEEKLY
Qty: 4 CAPSULE | Refills: 5 | Status: SHIPPED | OUTPATIENT
Start: 2024-01-13

## 2024-02-02 ENCOUNTER — OFFICE VISIT (OUTPATIENT)
Dept: PSYCHIATRY | Facility: CLINIC | Age: 54
End: 2024-02-02
Payer: MEDICARE

## 2024-02-02 VITALS — HEART RATE: 84 BPM | WEIGHT: 229 LBS | BODY MASS INDEX: 32.78 KG/M2 | HEIGHT: 70 IN | OXYGEN SATURATION: 96 %

## 2024-02-02 DIAGNOSIS — F20.0 PARANOID SCHIZOPHRENIA: ICD-10-CM

## 2024-02-02 DIAGNOSIS — F06.4 ANXIETY DISORDER DUE TO KNOWN PHYSIOLOGICAL CONDITION: ICD-10-CM

## 2024-02-02 RX ORDER — OLANZAPINE 5 MG/1
5 TABLET ORAL NIGHTLY
Qty: 30 TABLET | Refills: 2 | Status: SHIPPED | OUTPATIENT
Start: 2024-02-02

## 2024-02-02 RX ORDER — AMITRIPTYLINE HYDROCHLORIDE 50 MG/1
50 TABLET, FILM COATED ORAL
Qty: 30 TABLET | Refills: 2 | Status: SHIPPED | OUTPATIENT
Start: 2024-02-02

## 2024-02-02 NOTE — PROGRESS NOTES
Subjective   Alfonso Barnett is a 53 y.o. male is here today for medication management follow-up.    Chief Complaint:  schizophrenia     History of Present Illness:    Patient presents today for a follow up for medication management for schizophrenia. Denies any medical changes since last visit. Patient reports medication compliance and denies any side effects. Patient states having some trouble with family stuff. Patient reports had a lot going on and lost temper once. Patient reports upset and  from wife at the moment. Patient states crying a few times recently. Depression at a 4-5 on a 0/10 scale with 10 the worst with situation. Denies any panic attacks. Anxiety at a 4-5 on a 0/10 scale with 10 the worst. Denies any thoughts to harm self or others. Denies any auditory or visual hallucinations. Patient states getting about 5-6 hours of sleep at least. Denies any nightmares. Appetite is good and eating at least 2-3 meals a day.   The following portions of the patient's history were reviewed and updated as appropriate: allergies, current medications, past family history, past medical history, past social history, past surgical history, and problem list.    Review of Systems   Constitutional: Negative.    Respiratory: Negative.     Cardiovascular: Negative.    Gastrointestinal: Negative.    Neurological: Negative.    Psychiatric/Behavioral:  Positive for agitation and dysphoric mood. The patient is nervous/anxious.        Objective   Physical Exam  Vitals reviewed.   Constitutional:       Appearance: Normal appearance. He is well-developed and well-groomed.   Neurological:      Mental Status: He is alert.   Psychiatric:         Attention and Perception: Attention and perception normal.         Mood and Affect: Mood is depressed. Affect is tearful.         Speech: Speech normal.         Behavior: Behavior normal. Behavior is cooperative.         Thought Content: Thought content normal.         Cognition  "and Memory: Cognition and memory normal.         Judgment: Judgment normal.       Pulse 84, height 177.8 cm (70\"), weight 104 kg (229 lb), SpO2 96%.Body mass index is 32.86 kg/m².    No Known Allergies    Medication List:   Current Outpatient Medications   Medication Sig Dispense Refill    amitriptyline (ELAVIL) 50 MG tablet TAKE 1 TABLET BY MOUTH EVERY NIGHT AT BEDTIME. 30 tablet 0    aspirin 81 MG EC tablet Take 1 tablet by mouth Daily. 90 tablet 0    atenolol (TENORMIN) 25 MG tablet Take 1 tablet by mouth Daily. 30 tablet 0    cetirizine (zyrTEC) 10 MG tablet Take 1 tablet by mouth Daily As Needed for Allergies. 0 30 tablet 0    lisinopril (PRINIVIL,ZESTRIL) 10 MG tablet Take 1 tablet by mouth Daily. 30 tablet 0    OLANZapine (zyPREXA) 5 MG tablet TAKE 1 TABLET BY MOUTH EVERY NIGHT. 30 tablet 2    sildenafil (REVATIO) 20 MG tablet Take 1-2 tablets by mouth Daily As Needed (ED). 60 tablet 5    simvastatin (ZOCOR) 40 MG tablet Take 1 tablet by mouth Every Night. 30 tablet 0    vitamin D (ERGOCALCIFEROL) 1.25 MG (04931 UT) capsule capsule Take 1 capsule by mouth 1 (One) Time Per Week. 4 capsule 5     No current facility-administered medications for this visit.       Mental Status Exam:   Hygiene:   good  Cooperation:  Cooperative  Eye Contact:  Fair  Psychomotor Behavior:  Restless  Affect:  Appropriate  Hopelessness: Denies  Speech:  Normal  Thought Process:  Goal directed and Linear  Thought Content:  Normal  Suicidal:  None  Homicidal:  None  Hallucinations:  None  Delusion:  None  Memory:  Intact  Orientation:  Person, Place, Time, and Situation  Reliability:  fair  Insight:  Fair  Judgement:  Fair  Impulse Control:  Fair  Physical/Medical Issues:  No              Assessment & Plan   Diagnoses and all orders for this visit:    1. Anxiety disorder due to known physiological condition  -     amitriptyline (ELAVIL) 50 MG tablet; Take 1 tablet by mouth every night at bedtime.  Dispense: 30 tablet; Refill: 2    2. " Paranoid schizophrenia  -     OLANZapine (zyPREXA) 5 MG tablet; Take 1 tablet by mouth Every Night.  Dispense: 30 tablet; Refill: 2            Discussed medication options with patient. Cont. Zyprexa 5 mg daily at night for schizophrenia. Cont. Amitriptyline 50 mg nightly for anxiety. Reviewed the risks, benefits, and side effects of the medications; patient acknowledged and verbally consented.   Patient was instructed on medication side effects, benefits, and also of no treatment.  Patient was given an explanation regarding potential for increased risk of diabetes, lipids, and weight gain.  Labs will be assessed as clinically indicated.  Diet was discussed especially healthy diet choices and increasing activity and exercise.  Patient was strongly urged to continue weight maintance or weight loss efforts.  Patient reported verbalized understanding of instructions.   Patient is agreeable to call the office with any questions, concerns, or worsening of symptoms. Patient is aware to call 911 or go to the nearest ER should begin having any thoughts to harm self or others.           Follow up in four weeks        Errors in dictation may reflect use of voice recognition software and not all errors in transcription may have been detected prior to signing.              This document has been electronically signed by ADOLFO Barbour   February 2, 2024 08:20 EST

## 2024-02-15 ENCOUNTER — OFFICE VISIT (OUTPATIENT)
Dept: FAMILY MEDICINE CLINIC | Facility: CLINIC | Age: 54
End: 2024-02-15
Payer: MEDICARE

## 2024-02-15 VITALS
OXYGEN SATURATION: 98 % | BODY MASS INDEX: 32.21 KG/M2 | HEART RATE: 82 BPM | HEIGHT: 70 IN | TEMPERATURE: 98.4 F | SYSTOLIC BLOOD PRESSURE: 124 MMHG | WEIGHT: 225 LBS | RESPIRATION RATE: 16 BRPM | DIASTOLIC BLOOD PRESSURE: 76 MMHG

## 2024-02-15 DIAGNOSIS — Z23 ENCOUNTER FOR IMMUNIZATION: Primary | ICD-10-CM

## 2024-02-15 DIAGNOSIS — E66.09 CLASS 1 OBESITY DUE TO EXCESS CALORIES WITH SERIOUS COMORBIDITY IN ADULT, UNSPECIFIED BMI: ICD-10-CM

## 2024-02-15 DIAGNOSIS — I10 ESSENTIAL HYPERTENSION: Chronic | ICD-10-CM

## 2024-02-15 DIAGNOSIS — F20.0 PARANOID SCHIZOPHRENIA: ICD-10-CM

## 2024-02-15 DIAGNOSIS — E78.5 DYSLIPIDEMIA: Chronic | ICD-10-CM

## 2024-02-15 DIAGNOSIS — E78.2 MIXED HYPERLIPIDEMIA: ICD-10-CM

## 2024-02-15 RX ORDER — ATENOLOL 25 MG/1
25 TABLET ORAL DAILY
Qty: 30 TABLET | Refills: 5 | Status: SHIPPED | OUTPATIENT
Start: 2024-02-15

## 2024-02-15 RX ORDER — LISINOPRIL 10 MG/1
10 TABLET ORAL DAILY
Qty: 30 TABLET | Refills: 5 | Status: SHIPPED | OUTPATIENT
Start: 2024-02-15

## 2024-02-15 RX ORDER — SIMVASTATIN 40 MG
40 TABLET ORAL NIGHTLY
Qty: 30 TABLET | Refills: 5 | Status: SHIPPED | OUTPATIENT
Start: 2024-02-15

## 2024-03-01 ENCOUNTER — OFFICE VISIT (OUTPATIENT)
Dept: PSYCHIATRY | Facility: CLINIC | Age: 54
End: 2024-03-01
Payer: MEDICARE

## 2024-03-01 VITALS
HEIGHT: 70 IN | WEIGHT: 226 LBS | DIASTOLIC BLOOD PRESSURE: 66 MMHG | HEART RATE: 91 BPM | OXYGEN SATURATION: 94 % | SYSTOLIC BLOOD PRESSURE: 124 MMHG | BODY MASS INDEX: 32.35 KG/M2

## 2024-03-01 DIAGNOSIS — F06.4 ANXIETY DISORDER DUE TO KNOWN PHYSIOLOGICAL CONDITION: ICD-10-CM

## 2024-03-01 DIAGNOSIS — F20.0 PARANOID SCHIZOPHRENIA: ICD-10-CM

## 2024-03-01 RX ORDER — OLANZAPINE 5 MG/1
5 TABLET ORAL NIGHTLY
Start: 2024-03-01

## 2024-03-01 RX ORDER — AMITRIPTYLINE HYDROCHLORIDE 50 MG/1
50 TABLET, FILM COATED ORAL
Start: 2024-03-01

## 2024-03-01 NOTE — PROGRESS NOTES
Subjective   Alfonso Barnett is a 53 y.o. male is here today for medication management follow-up.    Chief Complaint:  schizophrenia     History of Present Illness:    Patient presents today for a follow up for medication management for schizophrenia. Denies any medical changes since last visit. Patient states still dealing with stress at home. Patient states kids are still doing good and daughter still in rehab. Patient reports medication compliance and denies any side effects. Denies any anger outbursts. Anxiety at a 5-6 on a 0/10 scale with 10 the worst. Patient states having depression due to situation with wife. Depression at a 8 on a 0/10 scale with 10 the worst. Denies any thoughts to harm self or others. Patient only sleeping about 4 hours a night. Denies any nightmares. Denies any panic attacks. Patient reports occasionally go back to sleep in the mornings but not often. Appetite is good and eating a couple times a day. Denies any auditory or visual hallucinations.   The following portions of the patient's history were reviewed and updated as appropriate: allergies, current medications, past family history, past medical history, past social history, past surgical history, and problem list.    Review of Systems   Constitutional: Negative.    Respiratory: Negative.     Cardiovascular: Negative.    Gastrointestinal: Negative.    Neurological: Negative.    Psychiatric/Behavioral:  Positive for dysphoric mood and sleep disturbance. The patient is nervous/anxious.        Objective   Physical Exam  Vitals reviewed.   Constitutional:       Appearance: Normal appearance. He is well-developed and well-groomed.   Neurological:      Mental Status: He is alert.   Psychiatric:         Attention and Perception: Attention and perception normal.         Mood and Affect: Affect normal. Mood is anxious.         Speech: Speech normal.         Behavior: Behavior normal. Behavior is cooperative.         Thought Content:  "Thought content normal.         Cognition and Memory: Cognition and memory normal.         Judgment: Judgment normal.       Blood pressure 124/66, pulse 91, height 177.8 cm (70\"), weight 103 kg (226 lb), SpO2 94%.Body mass index is 32.43 kg/m².    No Known Allergies    Medication List:   Current Outpatient Medications   Medication Sig Dispense Refill    amitriptyline (ELAVIL) 50 MG tablet Take 1 tablet by mouth every night at bedtime. 30 tablet 2    aspirin 81 MG EC tablet Take 1 tablet by mouth Daily. 90 tablet 0    atenolol (TENORMIN) 25 MG tablet Take 1 tablet by mouth Daily. 30 tablet 5    cetirizine (zyrTEC) 10 MG tablet Take 1 tablet by mouth Daily As Needed for Allergies. 0 30 tablet 0    lisinopril (PRINIVIL,ZESTRIL) 10 MG tablet Take 1 tablet by mouth Daily. 30 tablet 5    OLANZapine (zyPREXA) 5 MG tablet Take 1 tablet by mouth Every Night. 30 tablet 2    sildenafil (REVATIO) 20 MG tablet Take 1-2 tablets by mouth Daily As Needed (ED). 60 tablet 5    simvastatin (ZOCOR) 40 MG tablet Take 1 tablet by mouth Every Night. 30 tablet 5    vitamin D (ERGOCALCIFEROL) 1.25 MG (03652 UT) capsule capsule Take 1 capsule by mouth 1 (One) Time Per Week. 4 capsule 5     No current facility-administered medications for this visit.       Mental Status Exam:   Hygiene:   good  Cooperation:  Cooperative  Eye Contact:  Good  Psychomotor Behavior:  Appropriate  Affect:  Appropriate  Hopelessness: Denies  Speech:  Normal  Thought Process:  Goal directed and Linear  Thought Content:  Normal  Suicidal:  None  Homicidal:  None  Hallucinations:  None  Delusion:  None  Memory:  Intact  Orientation:  Person, Place, Time, and Situation  Reliability:  fair  Insight:  Fair  Judgement:  Fair  Impulse Control:  Fair  Physical/Medical Issues:  No              Assessment & Plan   Diagnoses and all orders for this visit:    1. Anxiety disorder due to known physiological condition  -     amitriptyline (ELAVIL) 50 MG tablet; Take 1 tablet by " mouth every night at bedtime.    2. Paranoid schizophrenia  -     OLANZapine (zyPREXA) 5 MG tablet; Take 1 tablet by mouth Every Night.            Discussed medication options with patient. Cont. Amitriptyline 50 mg daily at night for anxiety. Cont. Zyprexa 5 mg daily at night for schizophrenia. Reviewed the risks, benefits, and side effects of the medications; patient acknowledged and verbally consented. Patient was instructed on medication side effects, benefits, and also of no treatment.  Patient was given an explanation regarding potential for increased risk of diabetes, lipids, and weight gain.  Labs will be assessed as clinically indicated.  Diet was discussed especially healthy diet choices and increasing activity and exercise.  Patient was strongly urged to continue weight maintance or weight loss efforts.  Patient reported verbalized understanding of instructions.   Patient is agreeable to call the office with any questions, concerns, or worsening of symptoms. Patient is aware to call 911 or go to the nearest ER should begin having any thoughts to harm self or others.          Follow up in four to six weeks        Errors in dictation may reflect use of voice recognition software and not all errors in transcription may have been detected prior to signing.              This document has been electronically signed by ADOLFO Barbour   March 1, 2024 11:34 EST

## 2024-04-01 ENCOUNTER — POP HEALTH PHARMACY (OUTPATIENT)
Dept: PHARMACY | Facility: OTHER | Age: 54
End: 2024-04-01
Payer: MEDICARE

## 2024-04-01 NOTE — PROGRESS NOTES
Population Health Pharmacy Outreach      Alfonso Barnett was called today to discuss medication adherence with LISINOPRIL ([employer] INHIBITORS)  SIMVASTATIN ([facility] [employer] REDUCTASE INHIBITORS) , as he was identified as having care opportunities.    Program Details    [facility]  Status: Enrolled  Effective Dates: 3/26/2024 - present  Responsible Staff: Nadya Rocha Identified    Adherence- Cholesterol  Adherence- Hypertension       Adherence and Medication Management              Medication Therapy Problems     Medication Therapy Recommendations  Essential hypertension    Current Medication: lisinopril (PRINIVIL,ZESTRIL) 10 MG tablet   Rationale: Medication requires monitoring - Needs additional monitoring   Note: requesting 90 days         Hyperlipidemia    Current Medication: simvastatin (ZOCOR) 40 MG tablet   Rationale: Medication requires monitoring - Needs additional monitoring   Note: Request for 90 days               Summary    Medication Management Summary    Topics discussed: lab monitoring and follow-up discussed, adherence and missed doses discussed, health goals discussed, reminder to refill or  medication discussed, self-monitoring discussed  Time spent: 46 - 60 min       Request made for 90 days supply for both medications.   Nadya Rocha, 04/01/24, 4:22 PM EDT.

## 2024-04-02 DIAGNOSIS — E78.5 DYSLIPIDEMIA: Chronic | ICD-10-CM

## 2024-04-02 DIAGNOSIS — I10 ESSENTIAL HYPERTENSION: Chronic | ICD-10-CM

## 2024-04-02 RX ORDER — SIMVASTATIN 40 MG
40 TABLET ORAL NIGHTLY
Qty: 90 TABLET | Refills: 2 | Status: SHIPPED | OUTPATIENT
Start: 2024-04-02

## 2024-04-02 RX ORDER — LISINOPRIL 10 MG/1
10 TABLET ORAL DAILY
Qty: 90 TABLET | Refills: 2 | Status: SHIPPED | OUTPATIENT
Start: 2024-04-02

## 2024-04-04 ENCOUNTER — POP HEALTH PHARMACY (OUTPATIENT)
Dept: PHARMACY | Facility: OTHER | Age: 54
End: 2024-04-04
Payer: MEDICARE

## 2024-04-16 DIAGNOSIS — I10 ESSENTIAL HYPERTENSION: Chronic | ICD-10-CM

## 2024-04-16 DIAGNOSIS — E78.5 DYSLIPIDEMIA: Chronic | ICD-10-CM

## 2024-04-16 RX ORDER — SIMVASTATIN 40 MG
40 TABLET ORAL NIGHTLY
Qty: 30 TABLET | Refills: 5 | OUTPATIENT
Start: 2024-04-16

## 2024-04-16 RX ORDER — LISINOPRIL 10 MG/1
10 TABLET ORAL DAILY
Qty: 30 TABLET | Refills: 5 | OUTPATIENT
Start: 2024-04-16

## 2024-04-16 RX ORDER — ATENOLOL 25 MG/1
25 TABLET ORAL DAILY
Qty: 30 TABLET | Refills: 5 | OUTPATIENT
Start: 2024-04-16

## 2024-04-29 ENCOUNTER — POP HEALTH PHARMACY (OUTPATIENT)
Dept: PHARMACY | Facility: OTHER | Age: 54
End: 2024-04-29
Payer: MEDICARE

## 2024-05-02 ENCOUNTER — OFFICE VISIT (OUTPATIENT)
Dept: PSYCHIATRY | Facility: CLINIC | Age: 54
End: 2024-05-02
Payer: MEDICARE

## 2024-05-02 VITALS
DIASTOLIC BLOOD PRESSURE: 74 MMHG | SYSTOLIC BLOOD PRESSURE: 120 MMHG | OXYGEN SATURATION: 100 % | BODY MASS INDEX: 32.46 KG/M2 | WEIGHT: 226.2 LBS | HEART RATE: 87 BPM

## 2024-05-02 DIAGNOSIS — F99 INSOMNIA DUE TO OTHER MENTAL DISORDER: Primary | ICD-10-CM

## 2024-05-02 DIAGNOSIS — F51.05 INSOMNIA DUE TO OTHER MENTAL DISORDER: Primary | ICD-10-CM

## 2024-05-02 DIAGNOSIS — F20.0 PARANOID SCHIZOPHRENIA: ICD-10-CM

## 2024-05-02 DIAGNOSIS — F41.1 GENERALIZED ANXIETY DISORDER: ICD-10-CM

## 2024-05-02 DIAGNOSIS — F33.1 MAJOR DEPRESSIVE DISORDER, RECURRENT EPISODE, MODERATE: ICD-10-CM

## 2024-05-02 RX ORDER — OLANZAPINE 5 MG/1
5 TABLET ORAL NIGHTLY
Qty: 30 TABLET | Refills: 1 | Status: SHIPPED | OUTPATIENT
Start: 2024-05-02

## 2024-05-02 RX ORDER — AMITRIPTYLINE HYDROCHLORIDE 100 MG/1
100 TABLET ORAL
Qty: 30 TABLET | Refills: 1 | Status: SHIPPED | OUTPATIENT
Start: 2024-05-02

## 2024-05-02 NOTE — PROGRESS NOTES
Subjective   Alfonso Barnett is a 53 y.o. male is here today for medication management follow-up.    Chief Complaint:  schizophrenia     History of Present Illness:    Patient presents today for a follow up for medication management for schizophrenia. Denies any medical changes since last visit. Patient states everything has been going good. Patient states staying busy with kids and things. Patient states still having some stressors with family stuff. Patient states handling everything well. Patient states having a lot of trouble with not sleeping. Patient states last night took 1.5 tab of the amitriptyline. Patient states this morning got up around 5 am but didn't go to sleep until midnight. Patient states laying down around 10 pm but tossing turning for a couple hours. Patient reports just lay there and think. Denies any nightmares. Denies any panic attacks. Denies any paranoia. Denies any auditory or visual hallucinations. Patient states doing really good mentally. Depression at a 5 on a 0/10 scale with 10 the worst. Anxiety at a 5 on a 0/10 scale with 10 the worst. Denies any thoughts to harm self or others. Patient reports medication compliance and denies any side effects.   The following portions of the patient's history were reviewed and updated as appropriate: allergies, current medications, past family history, past medical history, past social history, past surgical history, and problem list.    Review of Systems   Constitutional: Negative.    Respiratory: Negative.     Cardiovascular: Negative.    Gastrointestinal: Negative.    Neurological: Negative.    Psychiatric/Behavioral:  Positive for dysphoric mood and sleep disturbance. The patient is nervous/anxious.        Objective   Physical Exam  Vitals reviewed.   Constitutional:       Appearance: Normal appearance. He is well-developed and well-groomed.   Neurological:      Mental Status: He is alert.   Psychiatric:         Attention and Perception:  Attention and perception normal.         Mood and Affect: Affect normal. Mood is anxious.         Speech: Speech normal.         Behavior: Behavior normal. Behavior is cooperative.         Thought Content: Thought content normal.         Cognition and Memory: Cognition and memory normal.         Judgment: Judgment normal.       Blood pressure 120/74, pulse 87, weight 103 kg (226 lb 3.2 oz), SpO2 100%.Body mass index is 32.46 kg/m².    No Known Allergies    Medication List:   Current Outpatient Medications   Medication Sig Dispense Refill    amitriptyline (ELAVIL) 100 MG tablet Take 1 tablet by mouth every night at bedtime. 30 tablet 1    OLANZapine (zyPREXA) 5 MG tablet Take 1 tablet by mouth Every Night. 30 tablet 1    aspirin 81 MG EC tablet Take 1 tablet by mouth Daily. 90 tablet 0    atenolol (TENORMIN) 25 MG tablet Take 1 tablet by mouth Daily. 30 tablet 5    cetirizine (zyrTEC) 10 MG tablet Take 1 tablet by mouth Daily As Needed for Allergies. 0 30 tablet 0    lisinopril (PRINIVIL,ZESTRIL) 10 MG tablet Take 1 tablet by mouth Daily. 90 tablet 2    sildenafil (REVATIO) 20 MG tablet Take 1-2 tablets by mouth Daily As Needed (ED). 60 tablet 5    simvastatin (ZOCOR) 40 MG tablet Take 1 tablet by mouth Every Night. 90 tablet 2    vitamin D (ERGOCALCIFEROL) 1.25 MG (42413 UT) capsule capsule Take 1 capsule by mouth 1 (One) Time Per Week. 4 capsule 5     No current facility-administered medications for this visit.       Mental Status Exam:   Hygiene:   good  Cooperation:  Cooperative  Eye Contact:  Fair  Psychomotor Behavior:  Restless  Affect:  Appropriate  Hopelessness: Denies  Speech:  Normal  Thought Process:  Goal directed and Linear  Thought Content:  Normal  Suicidal:  None  Homicidal:  None  Hallucinations:  None  Delusion:  None  Memory:  Intact  Orientation:  Person, Place, Time, and Situation  Reliability:  fair  Insight:  Fair  Judgement:  Fair  Impulse Control:  Fair  Physical/Medical Issues:  No               Assessment & Plan   Diagnoses and all orders for this visit:    1. Insomnia due to other mental disorder (Primary)  -     amitriptyline (ELAVIL) 100 MG tablet; Take 1 tablet by mouth every night at bedtime.  Dispense: 30 tablet; Refill: 1    2. Generalized anxiety disorder  -     amitriptyline (ELAVIL) 100 MG tablet; Take 1 tablet by mouth every night at bedtime.  Dispense: 30 tablet; Refill: 1    3. Major depressive disorder, recurrent episode, moderate  -     amitriptyline (ELAVIL) 100 MG tablet; Take 1 tablet by mouth every night at bedtime.  Dispense: 30 tablet; Refill: 1    4. Paranoid schizophrenia  -     OLANZapine (zyPREXA) 5 MG tablet; Take 1 tablet by mouth Every Night.  Dispense: 30 tablet; Refill: 1            Discussed medication options with patient. Increase Amitriptyline to 100 mg daily at night for worsening anxiety and insomnia. Cont. Zyprexa 5 mg daily at night for schizophrenia. Reviewed the risks, benefits, and side effects of the medications; patient acknowledged and verbally consented.   Patient was instructed on medication side effects, benefits, and also of no treatment.  Patient was given an explanation regarding potential for increased risk of diabetes, lipids, and weight gain.  Labs will be assessed as clinically indicated.  Diet was discussed especially healthy diet choices and increasing activity and exercise.  Patient was strongly urged to continue weight maintenance or weight loss efforts.  Patient reported verbalized understanding of instructions.  Reviewed lab work ordered on 01/12/24 by PCP including TSH, Vit. B12, Vit. D, A1C, Lipid panel, CMP, and CBC.    Patient is agreeable to call the office with any questions, concerns, or worsening of symptoms. Patient is aware to call 911 or go to the nearest ER should begin having any thoughts to harm self or others.           Follow up in six weeks          Errors in dictation may reflect use of voice recognition software and not  all errors in transcription may have been detected prior to signing.              This document has been electronically signed by ADOLFO Barbour   May 2, 2024 09:53 EDT

## 2024-06-20 ENCOUNTER — POP HEALTH PHARMACY (OUTPATIENT)
Dept: PHARMACY | Facility: OTHER | Age: 54
End: 2024-06-20
Payer: MEDICARE

## 2024-06-28 ENCOUNTER — OFFICE VISIT (OUTPATIENT)
Dept: PSYCHIATRY | Facility: CLINIC | Age: 54
End: 2024-06-28
Payer: MEDICARE

## 2024-06-28 VITALS
DIASTOLIC BLOOD PRESSURE: 80 MMHG | BODY MASS INDEX: 32.64 KG/M2 | WEIGHT: 228 LBS | OXYGEN SATURATION: 96 % | SYSTOLIC BLOOD PRESSURE: 144 MMHG | HEIGHT: 70 IN | HEART RATE: 102 BPM

## 2024-06-28 DIAGNOSIS — F33.1 MAJOR DEPRESSIVE DISORDER, RECURRENT EPISODE, MODERATE: ICD-10-CM

## 2024-06-28 DIAGNOSIS — F99 INSOMNIA DUE TO OTHER MENTAL DISORDER: ICD-10-CM

## 2024-06-28 DIAGNOSIS — F41.1 GENERALIZED ANXIETY DISORDER: ICD-10-CM

## 2024-06-28 DIAGNOSIS — F51.05 INSOMNIA DUE TO OTHER MENTAL DISORDER: ICD-10-CM

## 2024-06-28 DIAGNOSIS — F20.0 PARANOID SCHIZOPHRENIA: ICD-10-CM

## 2024-06-28 PROCEDURE — 3079F DIAST BP 80-89 MM HG: CPT | Performed by: NURSE PRACTITIONER

## 2024-06-28 PROCEDURE — 1160F RVW MEDS BY RX/DR IN RCRD: CPT | Performed by: NURSE PRACTITIONER

## 2024-06-28 PROCEDURE — 3077F SYST BP >= 140 MM HG: CPT | Performed by: NURSE PRACTITIONER

## 2024-06-28 PROCEDURE — 99214 OFFICE O/P EST MOD 30 MIN: CPT | Performed by: NURSE PRACTITIONER

## 2024-06-28 PROCEDURE — 1159F MED LIST DOCD IN RCRD: CPT | Performed by: NURSE PRACTITIONER

## 2024-06-28 RX ORDER — DOXEPIN HYDROCHLORIDE 10 MG/1
10 CAPSULE ORAL NIGHTLY
Qty: 14 CAPSULE | Refills: 0 | Status: SHIPPED | OUTPATIENT
Start: 2024-06-28

## 2024-06-28 RX ORDER — OLANZAPINE 5 MG/1
5 TABLET ORAL NIGHTLY
Qty: 30 TABLET | Refills: 0 | Status: SHIPPED | OUTPATIENT
Start: 2024-06-28

## 2024-06-28 RX ORDER — AMITRIPTYLINE HYDROCHLORIDE 50 MG/1
50 TABLET, FILM COATED ORAL
Start: 2024-06-28

## 2024-06-28 NOTE — PROGRESS NOTES
Subjective   Alfonso Barnett is a 54 y.o. male is here today for medication management follow-up.    Chief Complaint:  schizophrenia    History of Present Illness:    Patient presents today for a follow up for medication management for schizophrenia. Denies any medical changes since last visit. Patient states still taking medications. Patient states still not sleeping good. Patient is only sleeping about 3-4 hours a night. Denies any nightmares.  Patient reports just trouble falling asleep. Patient states getting ready to go through divorce and it bothers him but handling it ok. Patient states still have some side effects from zyprexa. Patient denies any panic attacks. Patient states feel nervous talking right now. Denies any substance use. Denies any alcohol use.  Patient states not talked to wife in a long time. Patient states the paranoia has been ok. Denies any auditory or visual hallucinations. Depression at a 10 on a 0/10 scale with 10 the worst. Anxiety at a 8 on a 0/10 scale with 10 the worst. Appetite is good and eating at least 2-3 meals a day. Denies any thoughts to harm self or others.  Patient reports recently decreased to 50 mg of the amitriptyline.   The following portions of the patient's history were reviewed and updated as appropriate: allergies, current medications, past family history, past medical history, past social history, past surgical history, and problem list.    Review of Systems   Constitutional: Negative.    Respiratory: Negative.     Cardiovascular: Negative.    Gastrointestinal: Negative.    Neurological: Negative.    Psychiatric/Behavioral:  Positive for dysphoric mood and sleep disturbance. The patient is nervous/anxious.        Objective   Physical Exam  Vitals reviewed.   Constitutional:       Appearance: Normal appearance. He is well-developed and well-groomed.   Neurological:      Mental Status: He is alert.   Psychiatric:         Attention and Perception: Attention and  "perception normal.         Mood and Affect: Mood is anxious. Affect is tearful.         Speech: Speech normal.         Behavior: Behavior normal. Behavior is cooperative.         Thought Content: Thought content normal.         Cognition and Memory: Cognition and memory normal.         Judgment: Judgment normal.       Blood pressure 144/80, pulse 102, height 177.8 cm (70\"), weight 103 kg (228 lb), SpO2 96%.Body mass index is 32.71 kg/m².    No Known Allergies    Medication List:   Current Outpatient Medications   Medication Sig Dispense Refill    amitriptyline (ELAVIL) 50 MG tablet Take 1 tablet by mouth every night at bedtime. For one week then discontinue      OLANZapine (zyPREXA) 5 MG tablet Take 1 tablet by mouth Every Night. 30 tablet 0    aspirin 81 MG EC tablet Take 1 tablet by mouth Daily. 90 tablet 0    atenolol (TENORMIN) 25 MG tablet Take 1 tablet by mouth Daily. 30 tablet 5    cetirizine (zyrTEC) 10 MG tablet Take 1 tablet by mouth Daily As Needed for Allergies. 0 30 tablet 0    doxepin (SINEquan) 10 MG capsule Take 1 capsule by mouth Every Night. Start after stopping the amitriptyline. 14 capsule 0    lisinopril (PRINIVIL,ZESTRIL) 10 MG tablet Take 1 tablet by mouth Daily. 90 tablet 2    sildenafil (REVATIO) 20 MG tablet Take 1-2 tablets by mouth Daily As Needed (ED). 60 tablet 5    simvastatin (ZOCOR) 40 MG tablet Take 1 tablet by mouth Every Night. 90 tablet 2    vitamin D (ERGOCALCIFEROL) 1.25 MG (31932 UT) capsule capsule Take 1 capsule by mouth 1 (One) Time Per Week. 4 capsule 5     No current facility-administered medications for this visit.       Mental Status Exam:   Hygiene:   good  Cooperation:  Cooperative  Eye Contact:  Fair  Psychomotor Behavior:  Appropriate  Affect:  Appropriate  Hopelessness: Denies  Speech:  Normal  Thought Process:  Goal directed and Linear  Thought Content:  Normal  Suicidal:  None  Homicidal:  None  Hallucinations:  None  Delusion:  None  Memory:  " Intact  Orientation:  Person, Place, Time, and Situation  Reliability:  fair  Insight:  Fair  Judgement:  Fair  Impulse Control:  Fair  Physical/Medical Issues:  No                 Assessment & Plan   Diagnoses and all orders for this visit:    1. Generalized anxiety disorder  -     amitriptyline (ELAVIL) 50 MG tablet; Take 1 tablet by mouth every night at bedtime. For one week then discontinue  -     doxepin (SINEquan) 10 MG capsule; Take 1 capsule by mouth Every Night. Start after stopping the amitriptyline.  Dispense: 14 capsule; Refill: 0    2. Insomnia due to other mental disorder  -     amitriptyline (ELAVIL) 50 MG tablet; Take 1 tablet by mouth every night at bedtime. For one week then discontinue  -     doxepin (SINEquan) 10 MG capsule; Take 1 capsule by mouth Every Night. Start after stopping the amitriptyline.  Dispense: 14 capsule; Refill: 0    3. Major depressive disorder, recurrent episode, moderate  -     amitriptyline (ELAVIL) 50 MG tablet; Take 1 tablet by mouth every night at bedtime. For one week then discontinue  -     doxepin (SINEquan) 10 MG capsule; Take 1 capsule by mouth Every Night. Start after stopping the amitriptyline.  Dispense: 14 capsule; Refill: 0    4. Paranoid schizophrenia  -     OLANZapine (zyPREXA) 5 MG tablet; Take 1 tablet by mouth Every Night.  Dispense: 30 tablet; Refill: 0            Discussed medication options with patient. Decrease Amitriptyline to 50 mg 1 daily at night for 1 week and then discontinue.  Start doxepin 10 mg 1 capsule every night after stopping the amitriptyline for worsening insomnia.  Continue Zyprexa 5 mg 1 daily at night for schizophrenia and mood.  Reviewed the risks, benefits, and side effects of the medications; patient acknowledged and verbally consented. Patient was instructed on medication side effects, benefits, and also of no treatment.  Patient was given an explanation regarding potential for increased risk of diabetes, lipids, and weight  gain.  Labs will be assessed as clinically indicated.  Diet was discussed especially healthy diet choices and increasing activity and exercise.  Patient was strongly urged to continue weight maintenance or weight loss efforts.  Patient reported verbalized understanding of instructions.   Patient is agreeable to call the office with any questions, concerns, or worsening of symptoms.  Patient is aware to call 911 or go to the nearest ER should begin having thoughts to harm self or others.          Follow-up in 2 weeks        Errors in dictation may reflect use of voice recognition software and not all errors in transcription may have been detected prior to signing.              This document has been electronically signed by ADOLFO Barbour   July 22, 2024 09:03 EDT

## 2024-08-06 DIAGNOSIS — F20.0 PARANOID SCHIZOPHRENIA: ICD-10-CM

## 2024-08-06 DIAGNOSIS — F33.1 MAJOR DEPRESSIVE DISORDER, RECURRENT EPISODE, MODERATE: ICD-10-CM

## 2024-08-06 DIAGNOSIS — F41.1 GENERALIZED ANXIETY DISORDER: ICD-10-CM

## 2024-08-06 DIAGNOSIS — F51.05 INSOMNIA DUE TO OTHER MENTAL DISORDER: ICD-10-CM

## 2024-08-06 DIAGNOSIS — F99 INSOMNIA DUE TO OTHER MENTAL DISORDER: ICD-10-CM

## 2024-08-06 RX ORDER — OLANZAPINE 5 MG/1
5 TABLET ORAL NIGHTLY
Qty: 30 TABLET | Refills: 1 | Status: SHIPPED | OUTPATIENT
Start: 2024-08-06

## 2024-08-06 RX ORDER — AMITRIPTYLINE HYDROCHLORIDE 100 MG/1
100 TABLET ORAL
Qty: 30 TABLET | Refills: 1 | Status: SHIPPED | OUTPATIENT
Start: 2024-08-06

## 2024-08-26 ENCOUNTER — POP HEALTH PHARMACY (OUTPATIENT)
Dept: PHARMACY | Facility: OTHER | Age: 54
End: 2024-08-26
Payer: MEDICARE

## 2024-09-04 ENCOUNTER — OFFICE VISIT (OUTPATIENT)
Dept: FAMILY MEDICINE CLINIC | Facility: CLINIC | Age: 54
End: 2024-09-04
Payer: MEDICARE

## 2024-09-04 VITALS
HEART RATE: 103 BPM | RESPIRATION RATE: 20 BRPM | OXYGEN SATURATION: 98 % | BODY MASS INDEX: 31.52 KG/M2 | HEIGHT: 70 IN | WEIGHT: 220.2 LBS

## 2024-09-04 DIAGNOSIS — I79.8 OTHER DISORDERS OF ARTERIES, ARTERIOLES AND CAPILLARIES IN DISEASES CLASSIFIED ELSEWHERE: Chronic | ICD-10-CM

## 2024-09-04 DIAGNOSIS — F06.4 ANXIETY DISORDER DUE TO KNOWN PHYSIOLOGICAL CONDITION: ICD-10-CM

## 2024-09-04 DIAGNOSIS — F20.0 PARANOID SCHIZOPHRENIA: ICD-10-CM

## 2024-09-04 DIAGNOSIS — R73.9 BLOOD GLUCOSE ELEVATED: ICD-10-CM

## 2024-09-04 DIAGNOSIS — E78.2 MIXED HYPERLIPIDEMIA: Primary | ICD-10-CM

## 2024-09-04 DIAGNOSIS — E53.8 VITAMIN B 12 DEFICIENCY: ICD-10-CM

## 2024-09-04 DIAGNOSIS — I10 ESSENTIAL HYPERTENSION: ICD-10-CM

## 2024-09-04 DIAGNOSIS — R53.82 CHRONIC FATIGUE: ICD-10-CM

## 2024-09-04 DIAGNOSIS — E55.9 VITAMIN D DEFICIENCY: ICD-10-CM

## 2024-09-04 PROCEDURE — 99214 OFFICE O/P EST MOD 30 MIN: CPT | Performed by: NURSE PRACTITIONER

## 2024-09-04 PROCEDURE — 82306 VITAMIN D 25 HYDROXY: CPT | Performed by: NURSE PRACTITIONER

## 2024-09-04 PROCEDURE — 1160F RVW MEDS BY RX/DR IN RCRD: CPT | Performed by: NURSE PRACTITIONER

## 2024-09-04 PROCEDURE — 1159F MED LIST DOCD IN RCRD: CPT | Performed by: NURSE PRACTITIONER

## 2024-09-04 PROCEDURE — 82607 VITAMIN B-12: CPT | Performed by: NURSE PRACTITIONER

## 2024-09-04 PROCEDURE — 80053 COMPREHEN METABOLIC PANEL: CPT | Performed by: NURSE PRACTITIONER

## 2024-09-04 PROCEDURE — 80061 LIPID PANEL: CPT | Performed by: NURSE PRACTITIONER

## 2024-09-04 PROCEDURE — 85025 COMPLETE CBC W/AUTO DIFF WBC: CPT | Performed by: NURSE PRACTITIONER

## 2024-09-04 PROCEDURE — 84443 ASSAY THYROID STIM HORMONE: CPT | Performed by: NURSE PRACTITIONER

## 2024-09-04 PROCEDURE — 3044F HG A1C LEVEL LT 7.0%: CPT | Performed by: NURSE PRACTITIONER

## 2024-09-04 PROCEDURE — 83036 HEMOGLOBIN GLYCOSYLATED A1C: CPT | Performed by: NURSE PRACTITIONER

## 2024-09-04 PROCEDURE — 82043 UR ALBUMIN QUANTITATIVE: CPT | Performed by: NURSE PRACTITIONER

## 2024-09-04 PROCEDURE — 84439 ASSAY OF FREE THYROXINE: CPT | Performed by: NURSE PRACTITIONER

## 2024-09-04 RX ORDER — ATENOLOL 25 MG/1
25 TABLET ORAL DAILY
Qty: 30 TABLET | Refills: 5 | Status: SHIPPED | OUTPATIENT
Start: 2024-09-04

## 2024-09-04 RX ORDER — ERGOCALCIFEROL 1.25 MG/1
50000 CAPSULE, LIQUID FILLED ORAL WEEKLY
Qty: 4 CAPSULE | Refills: 5 | Status: SHIPPED | OUTPATIENT
Start: 2024-09-04

## 2024-09-04 RX ORDER — ASPIRIN 81 MG/1
81 TABLET ORAL DAILY
Qty: 90 TABLET | Refills: 2 | Status: SHIPPED | OUTPATIENT
Start: 2024-09-04

## 2024-09-04 NOTE — PROGRESS NOTES
"Subjective   Alfonso Barnett is a 54 y.o. male.     Chief Complaint   Patient presents with    Essential hypertension       History of Present Illness     Hypertension-chronic and ongoing.  Patient is currently taking atenolol 25 mg and lisinopril 10 mg.  Negative side effects.  No associated symptoms such as CP, SOA, HA, or dizziness.  He is not monitoring at home but has not had any readings.  He has been on  in the past but stopped due to a lot of bruising. He is currently on 81 mg  He questions if he should be back on a low dose.   He has been cutting down on eating and is working on weight loss.    Hyperlipidemia-chronic and ongoing.  Patient is currently taking simvastatin 40 mg.  He is trying to watch his diet.  Patient denies any negative side effects of cholesterol medication.  No reported myalgia or myopathies.  Mental health-patient is under the care of mental health provider.  He is currently on Zyprexa 5 mg nightly as well as Elavil 100 mg (titrated up from 50 mg)  No negative side effects.  He feels that his mood and behaviors are stable.  He reports without meds he cannot sleep but he is not sleeping well.  He reports that he feels very fatigued and anxious.  He  feels that he has no motivation to do anything.  He reports that he is not even doing his outside chores.  \"Feel awful\". He reports symptoms for 2-3 weeks at least.  He reports some crying episodes.  He has been going to [facility] in [place] for therapy.  Vitamin D deficiency-chronic and ongoing.  Patient is presently ordered vitamin D 50,000 units supplement.  He reports that he has not been getting his Vit D from his pharmacy.  Vitamin B12 deficiency-chronic and ongoing.  Patient is presently taking vitamin B12 supplement.  Patient is tolerating well and denies any negative side effects.    The following portions of the patient's history were reviewed and updated as appropriate: CC, ROS, allergies, current medications, past family " "history, past medical history, past social history, past surgical history and problem list.      Review of Systems   Constitutional:  Positive for fatigue. Negative for appetite change, unexpected weight gain and unexpected weight loss.   HENT:  Negative for congestion, ear pain, postnasal drip, rhinorrhea, sore throat, swollen glands, trouble swallowing and voice change.    Eyes:  Negative for blurred vision, double vision, pain and visual disturbance.        Wearing reading glasses   Respiratory:  Negative for cough, chest tightness, shortness of breath and wheezing.    Cardiovascular:  Negative for chest pain, palpitations and leg swelling.   Gastrointestinal:  Negative for abdominal pain, blood in stool, constipation, diarrhea, nausea and indigestion.   Genitourinary:  Positive for erectile dysfunction (due to side effects of medication). Negative for dysuria, hematuria and urgency.   Musculoskeletal:  Negative for arthralgias, back pain, gait problem, joint swelling and myalgias.   Skin:  Negative for color change and skin lesions.   Allergic/Immunologic: Negative.    Neurological:  Negative for dizziness, numbness and headache.   Hematological: Negative.    Psychiatric/Behavioral:  Positive for decreased concentration, dysphoric mood, sleep disturbance and stress. Negative for suicidal ideas. The patient is nervous/anxious.    All other systems reviewed and are negative.      Objective     Pulse 103   Resp 20   Ht 177.8 cm (70\")   Wt 99.9 kg (220 lb 3.2 oz)   SpO2 98%   BMI 31.60 kg/m²     Physical Exam  Vitals reviewed.   Constitutional:       General: He is not in acute distress.     Appearance: He is well-developed. He is obese. He is not diaphoretic.      Comments: Very fatigued appearing   HENT:      Head: Normocephalic and atraumatic.      Jaw: No tenderness.      Right Ear: Hearing, tympanic membrane, ear canal and external ear normal.      Left Ear: Hearing, tympanic membrane, ear canal and " external ear normal.      Nose: Nose normal. No congestion or rhinorrhea.      Mouth/Throat:      Mouth: Mucous membranes are moist.      Pharynx: Oropharynx is clear.   Eyes:      General: Lids are normal. No scleral icterus.     Extraocular Movements:      Right eye: Normal extraocular motion and no nystagmus.      Left eye: Normal extraocular motion and no nystagmus.      Conjunctiva/sclera: Conjunctivae normal.      Pupils: Pupils are equal, round, and reactive to light.   Neck:      Thyroid: No thyromegaly or thyroid tenderness.      Vascular: No carotid bruit or JVD.      Trachea: No tracheal tenderness.   Cardiovascular:      Rate and Rhythm: Normal rate and regular rhythm.      Pulses:           Dorsalis pedis pulses are 2+ on the right side and 2+ on the left side.        Posterior tibial pulses are 2+ on the right side and 2+ on the left side.      Heart sounds: Normal heart sounds, S1 normal and S2 normal. No murmur heard.  Pulmonary:      Effort: Pulmonary effort is normal. No accessory muscle usage, prolonged expiration or respiratory distress.      Breath sounds: Normal breath sounds.   Chest:      Chest wall: No tenderness.   Abdominal:      General: Bowel sounds are normal. There is no distension.      Palpations: Abdomen is soft. There is no hepatomegaly, splenomegaly or mass.      Tenderness: There is no abdominal tenderness.   Musculoskeletal:         General: Tenderness present.      Cervical back: Normal range of motion and neck supple.      Right lower leg: No edema.      Left lower leg: No edema.      Comments: Generalized muscular tenderness    Lymphadenopathy:      Head:      Right side of head: No submental or submandibular adenopathy.      Left side of head: No submental or submandibular adenopathy.      Cervical: No cervical adenopathy.      Right cervical: No superficial cervical adenopathy.     Left cervical: No superficial cervical adenopathy.   Skin:     General: Skin is warm and dry.       Capillary Refill: Capillary refill takes less than 2 seconds.      Coloration: Skin is not jaundiced or pale.      Findings: No erythema.      Nails: There is no clubbing.   Neurological:      Mental Status: He is alert and oriented to person, place, and time.      Cranial Nerves: No cranial nerve deficit or facial asymmetry.      Sensory: No sensory deficit.      Motor: No weakness, tremor, atrophy or abnormal muscle tone.      Coordination: Coordination normal.      Gait: Gait abnormal (mild antalgia).      Deep Tendon Reflexes: Reflexes are normal and symmetric.   Psychiatric:         Attention and Perception: He is attentive. He does not perceive auditory or visual hallucinations.         Mood and Affect: Mood is anxious and depressed. Affect is flat.         Speech: Speech normal.         Behavior: Behavior normal. Behavior is cooperative.         Thought Content: Thought content normal.         Cognition and Memory: Cognition normal.         Judgment: Judgment normal.         Diagnoses and all orders for this visit:    1. Mixed hyperlipidemia (Primary)  Assessment & Plan:  Continue simvastatin 40 mg.  We will plan for an updated cholesterol level.  Continue to work on a low-cholesterol diet    Orders:  -     CBC & Differential  -     Comprehensive Metabolic Panel  -     Lipid Panel    2. Essential hypertension  Comments:  Continue lisinopril and atenolol, weight loss recommended  Orders:  -     atenolol (TENORMIN) 25 MG tablet; Take 1 tablet by mouth Daily.  Dispense: 30 tablet; Refill: 5  -     CBC & Differential  -     Comprehensive Metabolic Panel  -     Lipid Panel  -     MicroAlbumin, Urine, Random - Urine, Clean Catch    3. Vitamin D deficiency  Comments:  updated Vit d level ordered today.  will resend vit D supplement to pharmacy    Orders:  -     vitamin D (ERGOCALCIFEROL) 1.25 MG (45152 UT) capsule capsule; Take 1 capsule by mouth 1 (One) Time Per Week.  Dispense: 4 capsule; Refill: 5  -      Vitamin D,25-Hydroxy    4. 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  Dispense: 90 tablet; Refill: 2    5. Vitamin B 12 deficiency    6. Blood glucose elevated  Assessment & Plan:  Updated A1C ordered.    Orders:  -     Hemoglobin A1c    7. Chronic fatigue  -     TSH  -     T4, Free  -     Vitamin B12    8. Paranoid schizophrenia  Assessment & Plan:  Possibly exacerbating  Continue Elavil as directed for now.  Continue Zyprexa as directed..  Continue under the care of mental health.  Will consult scheduling for a sooner appt if available  Encouraged to keep therapy appt at [facility]        9. Anxiety disorder due to known physiological condition  Assessment & Plan:  Stress reduction advised (examples:  make time for self, Reading, Listening to music, Exercise, keeping a journal, venting to a confidant, etc.)          Understands disease processes and need for medications.  Understands reasons for urgent and emergent care.  Patient (& family) verbalized agreement for treatment plan.   Emotional support and active listening provided.  Patient provided time to verbalize feelings.    Fasting labs ordered.  Will call patient with results and make any meds changes PRN  Refill on routine maintenance meds today. .    RTC 3-4 months, sooner if needed.             This document has been electronically signed by:  ADOLFO Magdaleno FNP-C Dragon disclaimer:  Part of this note may be an electronic transcription/translation of spoken language to printed text using the Dragon Dictation System.

## 2024-09-04 NOTE — ASSESSMENT & PLAN NOTE
Possibly exacerbating  Continue Elavil as directed for now.  Continue Zyprexa as directed..  Continue under the care of mental health.  Will consult scheduling for a sooner appt if available  Encouraged to keep therapy appt at [facility]

## 2024-09-04 NOTE — ASSESSMENT & PLAN NOTE
Stress reduction advised (examples:  make time for self, Reading, Listening to music, Exercise, keeping a journal, venting to a confidant, etc.)

## 2024-09-05 LAB
25(OH)D3 SERPL-MCNC: 38 NG/ML (ref 30–100)
ALBUMIN SERPL-MCNC: 4.8 G/DL (ref 3.5–5.2)
ALBUMIN UR-MCNC: <1.2 MG/DL
ALBUMIN/GLOB SERPL: 1.7 G/DL
ALP SERPL-CCNC: 83 U/L (ref 39–117)
ALT SERPL W P-5'-P-CCNC: 25 U/L (ref 1–41)
ANION GAP SERPL CALCULATED.3IONS-SCNC: 13.3 MMOL/L (ref 5–15)
AST SERPL-CCNC: 19 U/L (ref 1–40)
BASOPHILS # BLD AUTO: 0.07 10*3/MM3 (ref 0–0.2)
BASOPHILS NFR BLD AUTO: 1 % (ref 0–1.5)
BILIRUB SERPL-MCNC: <0.2 MG/DL (ref 0–1.2)
BUN SERPL-MCNC: 10 MG/DL (ref 6–20)
BUN/CREAT SERPL: 11.2 (ref 7–25)
CALCIUM SPEC-SCNC: 9.7 MG/DL (ref 8.6–10.5)
CHLORIDE SERPL-SCNC: 96 MMOL/L (ref 98–107)
CHOLEST SERPL-MCNC: 268 MG/DL (ref 0–200)
CO2 SERPL-SCNC: 22.7 MMOL/L (ref 22–29)
CREAT SERPL-MCNC: 0.89 MG/DL (ref 0.76–1.27)
DEPRECATED RDW RBC AUTO: 39.9 FL (ref 37–54)
EGFRCR SERPLBLD CKD-EPI 2021: 100.6 ML/MIN/1.73
EOSINOPHIL # BLD AUTO: 0.07 10*3/MM3 (ref 0–0.4)
EOSINOPHIL NFR BLD AUTO: 1 % (ref 0.3–6.2)
ERYTHROCYTE [DISTWIDTH] IN BLOOD BY AUTOMATED COUNT: 13.1 % (ref 12.3–15.4)
GLOBULIN UR ELPH-MCNC: 2.8 GM/DL
GLUCOSE SERPL-MCNC: 116 MG/DL (ref 65–99)
HBA1C MFR BLD: 5.9 % (ref 4.8–5.6)
HCT VFR BLD AUTO: 44.2 % (ref 37.5–51)
HDLC SERPL-MCNC: 59 MG/DL (ref 40–60)
HGB BLD-MCNC: 14.5 G/DL (ref 13–17.7)
IMM GRANULOCYTES # BLD AUTO: 0.02 10*3/MM3 (ref 0–0.05)
IMM GRANULOCYTES NFR BLD AUTO: 0.3 % (ref 0–0.5)
LDLC SERPL CALC-MCNC: 188 MG/DL (ref 0–100)
LDLC/HDLC SERPL: 3.14 {RATIO}
LYMPHOCYTES # BLD AUTO: 1.68 10*3/MM3 (ref 0.7–3.1)
LYMPHOCYTES NFR BLD AUTO: 23.3 % (ref 19.6–45.3)
MCH RBC QN AUTO: 27.7 PG (ref 26.6–33)
MCHC RBC AUTO-ENTMCNC: 32.8 G/DL (ref 31.5–35.7)
MCV RBC AUTO: 84.5 FL (ref 79–97)
MONOCYTES # BLD AUTO: 0.5 10*3/MM3 (ref 0.1–0.9)
MONOCYTES NFR BLD AUTO: 6.9 % (ref 5–12)
NEUTROPHILS NFR BLD AUTO: 4.88 10*3/MM3 (ref 1.7–7)
NEUTROPHILS NFR BLD AUTO: 67.5 % (ref 42.7–76)
NRBC BLD AUTO-RTO: 0 /100 WBC (ref 0–0.2)
PLATELET # BLD AUTO: 377 10*3/MM3 (ref 140–450)
PMV BLD AUTO: 9.6 FL (ref 6–12)
POTASSIUM SERPL-SCNC: 4.4 MMOL/L (ref 3.5–5.2)
PROT SERPL-MCNC: 7.6 G/DL (ref 6–8.5)
RBC # BLD AUTO: 5.23 10*6/MM3 (ref 4.14–5.8)
SODIUM SERPL-SCNC: 132 MMOL/L (ref 136–145)
T4 FREE SERPL-MCNC: 1.13 NG/DL (ref 0.92–1.68)
TRIGL SERPL-MCNC: 119 MG/DL (ref 0–150)
TSH SERPL DL<=0.05 MIU/L-ACNC: 1.41 UIU/ML (ref 0.27–4.2)
VIT B12 BLD-MCNC: 477 PG/ML (ref 211–946)
VLDLC SERPL-MCNC: 21 MG/DL (ref 5–40)
WBC NRBC COR # BLD AUTO: 7.22 10*3/MM3 (ref 3.4–10.8)

## 2024-09-12 ENCOUNTER — OFFICE VISIT (OUTPATIENT)
Dept: PSYCHIATRY | Facility: CLINIC | Age: 54
End: 2024-09-12
Payer: MEDICARE

## 2024-09-12 VITALS
DIASTOLIC BLOOD PRESSURE: 72 MMHG | OXYGEN SATURATION: 97 % | WEIGHT: 220 LBS | BODY MASS INDEX: 31.57 KG/M2 | SYSTOLIC BLOOD PRESSURE: 130 MMHG | HEART RATE: 95 BPM

## 2024-09-12 DIAGNOSIS — F20.0 PARANOID SCHIZOPHRENIA: ICD-10-CM

## 2024-09-12 DIAGNOSIS — F99 INSOMNIA DUE TO OTHER MENTAL DISORDER: ICD-10-CM

## 2024-09-12 DIAGNOSIS — F33.1 MAJOR DEPRESSIVE DISORDER, RECURRENT EPISODE, MODERATE: ICD-10-CM

## 2024-09-12 DIAGNOSIS — F41.1 GENERALIZED ANXIETY DISORDER: ICD-10-CM

## 2024-09-12 DIAGNOSIS — F51.05 INSOMNIA DUE TO OTHER MENTAL DISORDER: ICD-10-CM

## 2024-09-12 RX ORDER — OLANZAPINE 7.5 MG/1
7.5 TABLET, FILM COATED ORAL NIGHTLY
Qty: 30 TABLET | Refills: 0 | Status: SHIPPED | OUTPATIENT
Start: 2024-09-12

## 2024-09-12 RX ORDER — TRIFLUOPERAZINE HYDROCHLORIDE 1 MG/1
0.5 TABLET, FILM COATED ORAL 2 TIMES DAILY PRN
Qty: 30 TABLET | Refills: 0 | Status: SHIPPED | OUTPATIENT
Start: 2024-09-12

## 2024-09-12 RX ORDER — AMITRIPTYLINE HYDROCHLORIDE 100 MG/1
100 TABLET ORAL
Qty: 30 TABLET | Refills: 0 | Status: SHIPPED | OUTPATIENT
Start: 2024-09-12

## 2024-09-16 ENCOUNTER — POP HEALTH PHARMACY (OUTPATIENT)
Dept: PHARMACY | Facility: OTHER | Age: 54
End: 2024-09-16
Payer: MEDICARE

## 2024-10-07 ENCOUNTER — POP HEALTH PHARMACY (OUTPATIENT)
Dept: PHARMACY | Facility: OTHER | Age: 54
End: 2024-10-07
Payer: MEDICARE

## 2024-10-10 ENCOUNTER — OFFICE VISIT (OUTPATIENT)
Dept: PSYCHIATRY | Facility: CLINIC | Age: 54
End: 2024-10-10
Payer: MEDICARE

## 2024-10-10 VITALS
WEIGHT: 220.6 LBS | SYSTOLIC BLOOD PRESSURE: 132 MMHG | OXYGEN SATURATION: 99 % | HEART RATE: 95 BPM | BODY MASS INDEX: 31.65 KG/M2 | DIASTOLIC BLOOD PRESSURE: 74 MMHG

## 2024-10-10 DIAGNOSIS — F99 INSOMNIA DUE TO OTHER MENTAL DISORDER: ICD-10-CM

## 2024-10-10 DIAGNOSIS — F51.05 INSOMNIA DUE TO OTHER MENTAL DISORDER: ICD-10-CM

## 2024-10-10 DIAGNOSIS — F20.0 PARANOID SCHIZOPHRENIA: ICD-10-CM

## 2024-10-10 DIAGNOSIS — F41.1 GENERALIZED ANXIETY DISORDER: ICD-10-CM

## 2024-10-10 DIAGNOSIS — F33.1 MAJOR DEPRESSIVE DISORDER, RECURRENT EPISODE, MODERATE: ICD-10-CM

## 2024-10-10 RX ORDER — ASENAPINE MALEATE 2.5 MG/1
TABLET SUBLINGUAL
Qty: 60 TABLET | Refills: 0 | Status: SHIPPED | OUTPATIENT
Start: 2024-10-10 | End: 2024-11-16

## 2024-10-10 RX ORDER — AMITRIPTYLINE HYDROCHLORIDE 100 MG/1
100 TABLET ORAL
Start: 2024-10-10

## 2024-10-10 RX ORDER — TRIFLUOPERAZINE HYDROCHLORIDE 1 MG/1
0.5 TABLET, FILM COATED ORAL 2 TIMES DAILY PRN
Qty: 30 TABLET | Refills: 0 | Status: SHIPPED | OUTPATIENT
Start: 2024-10-10

## 2024-10-10 RX ORDER — OLANZAPINE 5 MG/1
TABLET ORAL
Qty: 11 TABLET | Refills: 0 | Status: SHIPPED | OUTPATIENT
Start: 2024-10-10 | End: 2024-10-24

## 2024-10-10 NOTE — PROGRESS NOTES
Subjective   Alfonso Barnett is a 54 y.o. male is here today for medication management follow-up.    Chief Complaint:  schizophrenia     History of Present Illness:    Patient presents today for a follow up for medication management for schizophrenia. Denies any medical changes since last visit. Patient states tried the as needed medication and it has helped a lot with the anxiety. Patient states having a lot of trouble with grandson. Patient states a lot of stress with everything. Patient states reports medication compliance. Patient states sleeping good for the past two nights. Patient states trying to get a routine and going to bed on time. Denies any nightmares. Patient states still having to deal with the divorce and separation. Patient states having to take care of everyone. Sleeping about 4-5 hours a night. Denies any panic attacks. Depression at a 7 on a 0/10 scale with 10 the worst. Patient states worrying about the world. Anxiety at a 7-8 on a 0/10 scale with 10 the worst. Appetite is good and eating too much. Denies any paranoia. Denies any auditory or visual hallucinations. Patient reports still having a lot of sexual side effects from zyprexa.   The following portions of the patient's history were reviewed and updated as appropriate: allergies, current medications, past family history, past medical history, past social history, past surgical history, and problem list.    Review of Systems   Constitutional: Negative.    HENT: Negative.     Respiratory: Negative.     Cardiovascular: Negative.    Gastrointestinal: Negative.    Neurological: Negative.    Psychiatric/Behavioral:  Positive for dysphoric mood. The patient is nervous/anxious.        Objective   Physical Exam  Vitals reviewed.   Constitutional:       Appearance: Normal appearance. He is well-developed and well-groomed.   Neurological:      Mental Status: He is alert.   Psychiatric:         Attention and Perception: Attention and perception  normal.         Mood and Affect: Affect normal. Mood is anxious.         Speech: Speech normal.         Behavior: Behavior normal. Behavior is cooperative.         Thought Content: Thought content normal.         Cognition and Memory: Cognition and memory normal.         Judgment: Judgment normal.       Blood pressure 132/74, pulse 95, weight 100 kg (220 lb 9.6 oz), SpO2 99%.Body mass index is 31.65 kg/m².    No Known Allergies    Medication List:   Current Outpatient Medications   Medication Sig Dispense Refill    amitriptyline (ELAVIL) 100 MG tablet Take 1 tablet by mouth every night at bedtime.      OLANZapine (zyPREXA) 5 MG tablet Take 1 tablet by mouth Every Night for 7 days, THEN 0.5 tablets Every Night for 7 days. Then discontinue 11 tablet 0    trifluoperazine (STELAZINE) 1 MG tablet Take 0.5 tablets by mouth 2 (Two) Times a Day As Needed (anxiety and agitation). 30 tablet 0    Asenapine Maleate 2.5 MG sublingual tablet Place 1 tablet under the tongue Every Night for 7 days, THEN 2 tablets Every Night for 30 days. 60 tablet 0    aspirin 81 MG EC tablet Take 1 tablet by mouth Daily. 90 tablet 2    atenolol (TENORMIN) 25 MG tablet Take 1 tablet by mouth Daily. 30 tablet 5    cetirizine (zyrTEC) 10 MG tablet Take 1 tablet by mouth Daily As Needed for Allergies. 0 30 tablet 0    lisinopril (PRINIVIL,ZESTRIL) 10 MG tablet Take 1 tablet by mouth Daily. 90 tablet 2    sildenafil (REVATIO) 20 MG tablet Take 1-2 tablets by mouth Daily As Needed (ED). 60 tablet 5    simvastatin (ZOCOR) 40 MG tablet Take 1 tablet by mouth Every Night. 90 tablet 2    vitamin D (ERGOCALCIFEROL) 1.25 MG (32685 UT) capsule capsule Take 1 capsule by mouth 1 (One) Time Per Week. 4 capsule 5     No current facility-administered medications for this visit.       Mental Status Exam:   Hygiene:   good  Cooperation:  Cooperative  Eye Contact:  Fair  Psychomotor Behavior:  Restless  Affect:  Appropriate  Hopelessness: Denies  Speech:   Normal  Thought Process:  Goal directed and Linear  Thought Content:  Normal  Suicidal:  None  Homicidal:  None  Hallucinations:  Not demonstrated today  Delusion:  Not demonstrated today  Memory:  Intact  Orientation:  Person, Place, Time, and Situation  Reliability:  fair  Insight:  Fair  Judgement:  Fair  Impulse Control:  Fair  Physical/Medical Issues:  No                 Assessment & Plan   Diagnoses and all orders for this visit:    1. Paranoid schizophrenia  -     trifluoperazine (STELAZINE) 1 MG tablet; Take 0.5 tablets by mouth 2 (Two) Times a Day As Needed (anxiety and agitation).  Dispense: 30 tablet; Refill: 0  -     OLANZapine (zyPREXA) 5 MG tablet; Take 1 tablet by mouth Every Night for 7 days, THEN 0.5 tablets Every Night for 7 days. Then discontinue  Dispense: 11 tablet; Refill: 0  -     Asenapine Maleate 2.5 MG sublingual tablet; Place 1 tablet under the tongue Every Night for 7 days, THEN 2 tablets Every Night for 30 days.  Dispense: 60 tablet; Refill: 0    2. Generalized anxiety disorder  -     trifluoperazine (STELAZINE) 1 MG tablet; Take 0.5 tablets by mouth 2 (Two) Times a Day As Needed (anxiety and agitation).  Dispense: 30 tablet; Refill: 0  -     amitriptyline (ELAVIL) 100 MG tablet; Take 1 tablet by mouth every night at bedtime.    3. Insomnia due to other mental disorder  -     amitriptyline (ELAVIL) 100 MG tablet; Take 1 tablet by mouth every night at bedtime.    4. Major depressive disorder, recurrent episode, moderate  -     amitriptyline (ELAVIL) 100 MG tablet; Take 1 tablet by mouth every night at bedtime.            Discussed medication options with patient. Decrease Zyprexa to 5 mg daily at night for one week then decrease to 2.5 mg daily for one week then discontinue. Start Saphris 2.5 mg daily at night for one week then increase to two tablets daily at night for worsening schizophrenia. Cont. Stelazine 1 mg 0.5 tab twice daily as needed for anxiety and agitation. Cont. Elavil 100  mg daily at night for insomnia and anxiety. Reviewed the risks, benefits, and side effects of the medications; patient acknowledged and verbally consented. Patient was instructed on medication side effects, benefits, and also of no treatment.  Patient was given an explanation regarding potential for increased risk of diabetes, lipids, and weight gain.  Labs will be assessed as clinically indicated.  Diet was discussed especially healthy diet choices and increasing activity and exercise.  Patient was strongly urged to continue weight maintenance or weight loss efforts.  Patient reported verbalized understanding of instructions.  Patient is agreeable to call the office with any questions, concerns, or worsening of symptoms. Patient is aware to call 911 or go to the nearest ER should begin having any thoughts to harm self or others.          Follow up in four weeks          Errors in dictation may reflect use of voice recognition software and not all errors in transcription may have been detected prior to signing.              This document has been electronically signed by ADOLFO Barbour   October 24, 2024 16:33 EDT

## 2024-11-15 ENCOUNTER — OFFICE VISIT (OUTPATIENT)
Dept: PSYCHIATRY | Facility: CLINIC | Age: 54
End: 2024-11-15
Payer: MEDICARE

## 2024-11-15 VITALS
HEART RATE: 95 BPM | WEIGHT: 221.2 LBS | OXYGEN SATURATION: 96 % | DIASTOLIC BLOOD PRESSURE: 78 MMHG | BODY MASS INDEX: 31.74 KG/M2 | SYSTOLIC BLOOD PRESSURE: 150 MMHG

## 2024-11-15 DIAGNOSIS — F99 INSOMNIA DUE TO OTHER MENTAL DISORDER: ICD-10-CM

## 2024-11-15 DIAGNOSIS — F20.0 PARANOID SCHIZOPHRENIA: ICD-10-CM

## 2024-11-15 DIAGNOSIS — F33.1 MAJOR DEPRESSIVE DISORDER, RECURRENT EPISODE, MODERATE: ICD-10-CM

## 2024-11-15 DIAGNOSIS — F41.1 GENERALIZED ANXIETY DISORDER: ICD-10-CM

## 2024-11-15 DIAGNOSIS — F51.05 INSOMNIA DUE TO OTHER MENTAL DISORDER: ICD-10-CM

## 2024-11-15 PROCEDURE — 1160F RVW MEDS BY RX/DR IN RCRD: CPT | Performed by: NURSE PRACTITIONER

## 2024-11-15 PROCEDURE — 3078F DIAST BP <80 MM HG: CPT | Performed by: NURSE PRACTITIONER

## 2024-11-15 PROCEDURE — 3077F SYST BP >= 140 MM HG: CPT | Performed by: NURSE PRACTITIONER

## 2024-11-15 PROCEDURE — 1159F MED LIST DOCD IN RCRD: CPT | Performed by: NURSE PRACTITIONER

## 2024-11-15 PROCEDURE — 99214 OFFICE O/P EST MOD 30 MIN: CPT | Performed by: NURSE PRACTITIONER

## 2024-11-15 RX ORDER — OLANZAPINE 5 MG/1
5 TABLET ORAL NIGHTLY
Qty: 30 TABLET | Refills: 1 | Status: SHIPPED | OUTPATIENT
Start: 2024-11-15

## 2024-11-15 RX ORDER — ASENAPINE 5 MG/1
5 TABLET SUBLINGUAL NIGHTLY
Qty: 30 TABLET | Refills: 0 | Status: SHIPPED | OUTPATIENT
Start: 2024-11-15

## 2024-11-15 RX ORDER — AMITRIPTYLINE HYDROCHLORIDE 100 MG/1
100 TABLET ORAL
Qty: 30 TABLET | Refills: 0 | Status: SHIPPED | OUTPATIENT
Start: 2024-11-15

## 2024-11-15 RX ORDER — TRIFLUOPERAZINE HYDROCHLORIDE 1 MG/1
0.5 TABLET, FILM COATED ORAL 2 TIMES DAILY PRN
Qty: 30 TABLET | Refills: 0 | Status: SHIPPED | OUTPATIENT
Start: 2024-11-15

## 2024-11-15 NOTE — PROGRESS NOTES
Subjective   Alfonso Barnett is a 54 y.o. male is here today for medication management follow-up.    Chief Complaint:  schizophrenia     History of Present Illness:    Patient presents today for a follow up for medication management for schizophrenia. Denies any medical changes since last visit. Patient states feeling good but having some situational stuff at home with daughter and grandson. Patient states medicine wise doing ok. Patient denies any side effects to the saphris. Patient states he did start back on the zyprexa because wasn't sleeping. Patient states only taking the 5 mg of the zyprexa. Sleeping only about 4 hours a night. Denies any nightmares. Denies any panic attacks. Denies any paranoia. Denies any auditory or visual hallucinations. Patient reports he is talking to people more now and feeling better. Depression at a 6 on a 0/10 scale with 10 the worst. Denies any thoughts to harm self or others. Anxiety at a 5-6 on a 0/10 scale with 10 the worst. Appeitte is ok and eating at least twice a day. Denies any mood swings. Patient states ever so often get mad. Denies any aggression. Patient reports medication compliance and denies any side effects.   The following portions of the patient's history were reviewed and updated as appropriate: allergies, current medications, past family history, past medical history, past social history, past surgical history, and problem list.    Review of Systems   Constitutional: Negative.    Respiratory: Negative.     Cardiovascular: Negative.    Gastrointestinal: Negative.    Neurological: Negative.    Psychiatric/Behavioral:  Positive for agitation, dysphoric mood and sleep disturbance. The patient is nervous/anxious.        Objective   Physical Exam  Vitals reviewed.   Constitutional:       Appearance: Normal appearance. He is well-developed and well-groomed.   Neurological:      Mental Status: He is alert.   Psychiatric:         Attention and Perception: Attention  and perception normal.         Mood and Affect: Affect normal. Mood is anxious.         Speech: Speech normal.         Behavior: Behavior normal. Behavior is cooperative.         Thought Content: Thought content normal.         Cognition and Memory: Cognition and memory normal.         Judgment: Judgment normal.       Blood pressure 150/78, pulse 95, weight 100 kg (221 lb 3.2 oz), SpO2 96%.Body mass index is 31.74 kg/m².    No Known Allergies    Medication List:   Current Outpatient Medications   Medication Sig Dispense Refill    amitriptyline (ELAVIL) 100 MG tablet Take 1 tablet by mouth every night at bedtime. 30 tablet 0    OLANZapine (zyPREXA) 5 MG tablet Take 1 tablet by mouth Every Night. 30 tablet 1    trifluoperazine (STELAZINE) 1 MG tablet Take 0.5 tablets by mouth 2 (Two) Times a Day As Needed (anxiety and agitation). 30 tablet 0    asenapine maleate (SAPHRIS) 5 MG sublingual tablet sublingual tablet Place 1 tablet under the tongue Every Night. 30 tablet 0    aspirin 81 MG EC tablet Take 1 tablet by mouth Daily. 90 tablet 2    atenolol (TENORMIN) 25 MG tablet Take 1 tablet by mouth Daily. 30 tablet 5    cetirizine (zyrTEC) 10 MG tablet Take 1 tablet by mouth Daily As Needed for Allergies. 0 30 tablet 0    lisinopril (PRINIVIL,ZESTRIL) 10 MG tablet Take 1 tablet by mouth Daily. 90 tablet 2    sildenafil (REVATIO) 20 MG tablet Take 1-2 tablets by mouth Daily As Needed (ED). 60 tablet 5    simvastatin (ZOCOR) 40 MG tablet Take 1 tablet by mouth Every Night. 90 tablet 2    vitamin D (ERGOCALCIFEROL) 1.25 MG (34352 UT) capsule capsule Take 1 capsule by mouth 1 (One) Time Per Week. 4 capsule 5     No current facility-administered medications for this visit.       Mental Status Exam:   Hygiene:   good  Cooperation:  Cooperative  Eye Contact:  Fair  Psychomotor Behavior:  Appropriate  Affect:  Appropriate  Hopelessness: Denies  Speech:  Normal  Thought Process:  Goal directed and Linear  Thought Content:   Normal  Suicidal:  None  Homicidal:  None  Hallucinations:  None  Delusion:  None  Memory:  Intact  Orientation:  Person, Place, Time, and Situation  Reliability:  fair  Insight:  Fair  Judgement:  Fair  Impulse Control:  Fair  Physical/Medical Issues:  No               Assessment & Plan   Diagnoses and all orders for this visit:    1. Paranoid schizophrenia  -     OLANZapine (zyPREXA) 5 MG tablet; Take 1 tablet by mouth Every Night.  Dispense: 30 tablet; Refill: 1  -     trifluoperazine (STELAZINE) 1 MG tablet; Take 0.5 tablets by mouth 2 (Two) Times a Day As Needed (anxiety and agitation).  Dispense: 30 tablet; Refill: 0  -     asenapine maleate (SAPHRIS) 5 MG sublingual tablet sublingual tablet; Place 1 tablet under the tongue Every Night.  Dispense: 30 tablet; Refill: 0    2. Generalized anxiety disorder  -     amitriptyline (ELAVIL) 100 MG tablet; Take 1 tablet by mouth every night at bedtime.  Dispense: 30 tablet; Refill: 0  -     trifluoperazine (STELAZINE) 1 MG tablet; Take 0.5 tablets by mouth 2 (Two) Times a Day As Needed (anxiety and agitation).  Dispense: 30 tablet; Refill: 0    3. Insomnia due to other mental disorder  -     amitriptyline (ELAVIL) 100 MG tablet; Take 1 tablet by mouth every night at bedtime.  Dispense: 30 tablet; Refill: 0    4. Major depressive disorder, recurrent episode, moderate  -     amitriptyline (ELAVIL) 100 MG tablet; Take 1 tablet by mouth every night at bedtime.  Dispense: 30 tablet; Refill: 0            Discussed medication options with patient. Cont. Saphris 5 mg daily at night for schizophrenia. Cont. Zyprexa 5 mg daily at night for schizophrenia and anxiety. Cont. Stelazine 1 mg 0.5 tab twice daily as needed for anxiety and agitation. Cont. Amitriptyline 100 mg daily at night for anxiety, depression, and insomnia. Reviewed the risks, benefits, and side effects of the medications; patient acknowledged and verbally consented. Discussed with patient will continue to titrate  Saphris dose to stabilize mood and wean off Zyprexa. Patient acknowledged and agreed.   Patient was instructed on medication side effects, benefits, and also of no treatment.  Patient was given an explanation regarding potential for increased risk of diabetes, lipids, and weight gain.  Labs will be assessed as clinically indicated.  Diet was discussed especially healthy diet choices and increasing activity and exercise.  Patient was strongly urged to continue weight maintenance or weight loss efforts.  Patient reported verbalized understanding of instructions.    Patient is agreeable to call the office with any questions, concerns, or worsening of symptoms. Patient is aware to call 911 or go to the nearest ER should begin having any thoughts to harm self or others.           Follow up in four to six weeks        Errors in dictation may reflect use of voice recognition software and not all errors in transcription may have been detected prior to signing.              This document has been electronically signed by ADOLFO Barbour   December 2, 2024 13:58 EST

## 2024-11-20 ENCOUNTER — EXTERNAL PBMM DATA (OUTPATIENT)
Dept: PHARMACY | Facility: OTHER | Age: 54
End: 2024-11-20
Payer: MEDICARE

## 2024-11-26 ENCOUNTER — POP HEALTH PHARMACY (OUTPATIENT)
Dept: PHARMACY | Facility: OTHER | Age: 54
End: 2024-11-26
Payer: MEDICARE

## 2024-12-04 ENCOUNTER — POP HEALTH PHARMACY (OUTPATIENT)
Dept: PHARMACY | Facility: OTHER | Age: 54
End: 2024-12-04
Payer: MEDICARE

## 2024-12-17 DIAGNOSIS — F33.1 MAJOR DEPRESSIVE DISORDER, RECURRENT EPISODE, MODERATE: ICD-10-CM

## 2024-12-17 DIAGNOSIS — F41.1 GENERALIZED ANXIETY DISORDER: ICD-10-CM

## 2024-12-17 DIAGNOSIS — F51.05 INSOMNIA DUE TO OTHER MENTAL DISORDER: ICD-10-CM

## 2024-12-17 DIAGNOSIS — F99 INSOMNIA DUE TO OTHER MENTAL DISORDER: ICD-10-CM

## 2024-12-17 RX ORDER — AMITRIPTYLINE HYDROCHLORIDE 100 MG/1
100 TABLET ORAL
Qty: 30 TABLET | Refills: 0 | Status: SHIPPED | OUTPATIENT
Start: 2024-12-17

## 2024-12-23 DIAGNOSIS — F20.0 PARANOID SCHIZOPHRENIA: ICD-10-CM

## 2024-12-23 DIAGNOSIS — F41.1 GENERALIZED ANXIETY DISORDER: ICD-10-CM

## 2024-12-23 RX ORDER — ASENAPINE 5 MG/1
TABLET SUBLINGUAL
Qty: 30 TABLET | Refills: 0 | Status: SHIPPED | OUTPATIENT
Start: 2024-12-23

## 2024-12-23 RX ORDER — TRIFLUOPERAZINE HYDROCHLORIDE 1 MG/1
TABLET, FILM COATED ORAL
Qty: 30 TABLET | Refills: 0 | Status: SHIPPED | OUTPATIENT
Start: 2024-12-23

## 2025-01-03 ENCOUNTER — OFFICE VISIT (OUTPATIENT)
Dept: PSYCHIATRY | Facility: CLINIC | Age: 55
End: 2025-01-03
Payer: MEDICARE

## 2025-01-03 VITALS
SYSTOLIC BLOOD PRESSURE: 118 MMHG | OXYGEN SATURATION: 96 % | WEIGHT: 223 LBS | HEART RATE: 96 BPM | DIASTOLIC BLOOD PRESSURE: 76 MMHG | BODY MASS INDEX: 32 KG/M2

## 2025-01-03 DIAGNOSIS — F33.1 MAJOR DEPRESSIVE DISORDER, RECURRENT EPISODE, MODERATE: ICD-10-CM

## 2025-01-03 DIAGNOSIS — F41.1 GENERALIZED ANXIETY DISORDER: ICD-10-CM

## 2025-01-03 DIAGNOSIS — F20.0 PARANOID SCHIZOPHRENIA: ICD-10-CM

## 2025-01-03 DIAGNOSIS — F99 INSOMNIA DUE TO OTHER MENTAL DISORDER: ICD-10-CM

## 2025-01-03 DIAGNOSIS — F51.05 INSOMNIA DUE TO OTHER MENTAL DISORDER: ICD-10-CM

## 2025-01-03 PROCEDURE — 1160F RVW MEDS BY RX/DR IN RCRD: CPT | Performed by: NURSE PRACTITIONER

## 2025-01-03 PROCEDURE — 3074F SYST BP LT 130 MM HG: CPT | Performed by: NURSE PRACTITIONER

## 2025-01-03 PROCEDURE — 1159F MED LIST DOCD IN RCRD: CPT | Performed by: NURSE PRACTITIONER

## 2025-01-03 PROCEDURE — 3078F DIAST BP <80 MM HG: CPT | Performed by: NURSE PRACTITIONER

## 2025-01-03 PROCEDURE — 99214 OFFICE O/P EST MOD 30 MIN: CPT | Performed by: NURSE PRACTITIONER

## 2025-01-03 RX ORDER — AMITRIPTYLINE HYDROCHLORIDE 50 MG/1
50 TABLET ORAL
Qty: 30 TABLET | Refills: 0 | Status: SHIPPED | OUTPATIENT
Start: 2025-01-03

## 2025-01-03 RX ORDER — OLANZAPINE 10 MG/1
10 TABLET ORAL NIGHTLY
Qty: 30 TABLET | Refills: 1 | Status: SHIPPED | OUTPATIENT
Start: 2025-01-03

## 2025-01-03 NOTE — PROGRESS NOTES
Subjective   Alfonso Barnett is a 54 y.o. male is here today for medication management follow-up.    Chief Complaint:  schizophrenia    History of Present Illness:    Patient presents today for follow up for medication management for schizophrenia. Patient states everything has been going ok and doing pretty good. Patient states having a lot of trouble with shoulder since argument with daughter and grandson. Patient states missed last appointment with PCP but going to reschedule. Patient states stopped the Saphris due to feeling funny. Patient states only taking the Zyprexa but some nights take one or two. Patient states in the evenings start feeling really bad. Patient states only taking two of the 5 mg this past week. Patient states missed the zyprexa and mind started going. Patient states as long as taking the zyprexa then doing good. Patient reports currently depression is at a 7on a 0-10 scale with 10 being the worst.  Patient reports anxiety is at a 5 on a 0-10 scale with 10 being the worst. Patient denies any panic attacks. Patient states sleeping more when no school. Patient reports sleeping 4-5 hours a night and patient denies any nightmares. Patient reports appetite is good and eating 2-3 meals a day. Patient denies any auditory or visual hallucinations. Patient adamantly denies any thoughts to harm self or others. Patient denies any side effects to medications. Patient denies any medical changes since last visit. Patient states the  came out and talked to them again.   The following portions of the patient's history were reviewed and updated as appropriate: allergies, current medications, past family history, past medical history, past social history, past surgical history, and problem list.    Review of Systems   Constitutional: Negative.    Respiratory: Negative.     Cardiovascular: Negative.    Gastrointestinal: Negative.    Neurological: Negative.    Psychiatric/Behavioral:  Positive  for dysphoric mood and sleep disturbance. The patient is nervous/anxious.        Objective   Physical Exam  Vitals reviewed.   Constitutional:       Appearance: Normal appearance. He is well-developed and well-groomed.   Neurological:      Mental Status: He is alert.   Psychiatric:         Attention and Perception: Attention and perception normal.         Mood and Affect: Affect normal. Mood is anxious.         Speech: Speech normal.         Behavior: Behavior normal. Behavior is cooperative.         Thought Content: Thought content normal.         Cognition and Memory: Cognition and memory normal.         Judgment: Judgment normal.       Blood pressure 118/76, pulse 96, weight 101 kg (223 lb), SpO2 96%.Body mass index is 32 kg/m².    No Known Allergies    Medication List:   Current Outpatient Medications   Medication Sig Dispense Refill    amitriptyline (ELAVIL) 50 MG tablet Take 1 tablet by mouth every night at bedtime. 30 tablet 0    OLANZapine (zyPREXA) 10 MG tablet Take 1 tablet by mouth Every Night. 30 tablet 1    aspirin 81 MG EC tablet Take 1 tablet by mouth Daily. 90 tablet 2    atenolol (TENORMIN) 25 MG tablet Take 1 tablet by mouth Daily. 30 tablet 5    cetirizine (zyrTEC) 10 MG tablet Take 1 tablet by mouth Daily As Needed for Allergies. 0 30 tablet 0    lisinopril (PRINIVIL,ZESTRIL) 10 MG tablet Take 1 tablet by mouth Daily. 90 tablet 2    sildenafil (REVATIO) 20 MG tablet Take 1-2 tablets by mouth Daily As Needed (ED). 60 tablet 5    simvastatin (ZOCOR) 40 MG tablet Take 1 tablet by mouth Every Night. 90 tablet 2    vitamin D (ERGOCALCIFEROL) 1.25 MG (82658 UT) capsule capsule Take 1 capsule by mouth 1 (One) Time Per Week. 4 capsule 5     No current facility-administered medications for this visit.       Mental Status Exam:   Hygiene:   good  Cooperation:  Cooperative  Eye Contact:  Good  Psychomotor Behavior:  Appropriate  Affect:  Appropriate  Hopelessness: Denies  Speech:  Normal  Thought Process:   Goal directed and Linear  Thought Content:  Normal  Suicidal:  None  Homicidal:  None  Hallucinations:  None  Delusion:  None  Memory:  Intact  Orientation:  Person, Place, Time, and Situation  Reliability:  fair  Insight:  Fair  Judgement:  Fair  Impulse Control:  Fair  Physical/Medical Issues:  No               Assessment & Plan   Diagnoses and all orders for this visit:    1. Generalized anxiety disorder  -     amitriptyline (ELAVIL) 50 MG tablet; Take 1 tablet by mouth every night at bedtime.  Dispense: 30 tablet; Refill: 0    2. Insomnia due to other mental disorder  -     amitriptyline (ELAVIL) 50 MG tablet; Take 1 tablet by mouth every night at bedtime.  Dispense: 30 tablet; Refill: 0    3. Major depressive disorder, recurrent episode, moderate  -     amitriptyline (ELAVIL) 50 MG tablet; Take 1 tablet by mouth every night at bedtime.  Dispense: 30 tablet; Refill: 0    4. Paranoid schizophrenia  -     OLANZapine (zyPREXA) 10 MG tablet; Take 1 tablet by mouth Every Night.  Dispense: 30 tablet; Refill: 1            Discussed medication options with patient. Decrease Amitriptyline to 50 mg daily at night for depression and anxiety. Increase Zyprexa to 10 mg daily at night for schizophrenia and anxiety. Dsah. Saphris. Reviewed the risks, benefits, and side effects of the medications; patient acknowledged and verbally consented.   Patient was instructed on medication side effects, benefits, and also of no treatment.  Patient was given an explanation regarding potential for increased risk of diabetes, lipids, and weight gain.  Labs will be assessed as clinically indicated.  Diet was discussed especially healthy diet choices and increasing activity and exercise.  Patient was strongly urged to continue weight maintenance or weight loss efforts.  Patient reported verbalized understanding of instructions.    Patient is agreeable to call the office with any questions, concerns, or worsening of symptoms. Patient is  aware to call 911 or go to the nearest ER should begin having any thoughts to harm self or others.          Follow up in four weeks        Errors in dictation may reflect use of voice recognition software and not all errors in transcription may have been detected prior to signing.              This document has been electronically signed by ADOLFO Barbour   January 3, 2025 16:20 EST

## 2025-01-17 ENCOUNTER — TELEPHONE (OUTPATIENT)
Dept: PSYCHIATRY | Facility: CLINIC | Age: 55
End: 2025-01-17
Payer: MEDICARE

## 2025-01-17 NOTE — TELEPHONE ENCOUNTER
Pt called today stated he had talked to Medicare and [employer] and was finally able to get the insurance straightened out. He said that the Medicare office helped him get in contact with [employer] and they had to reactivate his benefits.    I have entered all data he provided

## 2025-01-21 ENCOUNTER — OFFICE VISIT (OUTPATIENT)
Dept: FAMILY MEDICINE CLINIC | Facility: CLINIC | Age: 55
End: 2025-01-21
Payer: MEDICARE

## 2025-01-21 VITALS
OXYGEN SATURATION: 97 % | SYSTOLIC BLOOD PRESSURE: 120 MMHG | WEIGHT: 224 LBS | DIASTOLIC BLOOD PRESSURE: 70 MMHG | HEART RATE: 88 BPM | HEIGHT: 70 IN | BODY MASS INDEX: 32.07 KG/M2 | TEMPERATURE: 99.3 F

## 2025-01-21 DIAGNOSIS — E55.9 VITAMIN D DEFICIENCY: ICD-10-CM

## 2025-01-21 DIAGNOSIS — Z00.00 HEALTH MAINTENANCE EXAMINATION: ICD-10-CM

## 2025-01-21 DIAGNOSIS — R53.82 CHRONIC FATIGUE: Primary | ICD-10-CM

## 2025-01-21 DIAGNOSIS — E61.1 IRON DEFICIENCY: ICD-10-CM

## 2025-01-21 DIAGNOSIS — G47.33 OBSTRUCTIVE SLEEP APNEA SYNDROME: ICD-10-CM

## 2025-01-21 DIAGNOSIS — E78.5 DYSLIPIDEMIA: ICD-10-CM

## 2025-01-21 DIAGNOSIS — Z12.5 PROSTATE CANCER SCREENING: ICD-10-CM

## 2025-01-21 LAB
ALBUMIN SERPL-MCNC: 4.4 G/DL (ref 3.5–5.2)
ALBUMIN/GLOB SERPL: 1.5 G/DL
ALP SERPL-CCNC: 88 U/L (ref 39–117)
ALT SERPL W P-5'-P-CCNC: 22 U/L (ref 1–41)
ANION GAP SERPL CALCULATED.3IONS-SCNC: 11 MMOL/L (ref 5–15)
AST SERPL-CCNC: 21 U/L (ref 1–40)
BASOPHILS # BLD AUTO: 0.07 10*3/MM3 (ref 0–0.2)
BASOPHILS NFR BLD AUTO: 0.7 % (ref 0–1.5)
BILIRUB SERPL-MCNC: 0.2 MG/DL (ref 0–1.2)
BUN SERPL-MCNC: 11 MG/DL (ref 6–20)
BUN/CREAT SERPL: 11 (ref 7–25)
CALCIUM SPEC-SCNC: 9.4 MG/DL (ref 8.6–10.5)
CHLORIDE SERPL-SCNC: 101 MMOL/L (ref 98–107)
CHOLEST SERPL-MCNC: 264 MG/DL (ref 0–200)
CO2 SERPL-SCNC: 23 MMOL/L (ref 22–29)
CREAT SERPL-MCNC: 1 MG/DL (ref 0.76–1.27)
DEPRECATED RDW RBC AUTO: 37.8 FL (ref 37–54)
EGFRCR SERPLBLD CKD-EPI 2021: 88.3 ML/MIN/1.73
EOSINOPHIL # BLD AUTO: 0.37 10*3/MM3 (ref 0–0.4)
EOSINOPHIL NFR BLD AUTO: 3.9 % (ref 0.3–6.2)
ERYTHROCYTE [DISTWIDTH] IN BLOOD BY AUTOMATED COUNT: 12.5 % (ref 12.3–15.4)
FERRITIN SERPL-MCNC: 72.91 NG/ML (ref 30–400)
GLOBULIN UR ELPH-MCNC: 3 GM/DL
GLUCOSE SERPL-MCNC: 125 MG/DL (ref 65–99)
HBA1C MFR BLD: 6.1 % (ref 4.8–5.6)
HCT VFR BLD AUTO: 44.3 % (ref 37.5–51)
HDLC SERPL-MCNC: 57 MG/DL (ref 40–60)
HGB BLD-MCNC: 14.9 G/DL (ref 13–17.7)
IMM GRANULOCYTES # BLD AUTO: 0.03 10*3/MM3 (ref 0–0.05)
IMM GRANULOCYTES NFR BLD AUTO: 0.3 % (ref 0–0.5)
IRON 24H UR-MRATE: 107 MCG/DL (ref 59–158)
IRON SATN MFR SERPL: 27 % (ref 20–50)
LDLC SERPL CALC-MCNC: 181 MG/DL (ref 0–100)
LDLC/HDLC SERPL: 3.13 {RATIO}
LYMPHOCYTES # BLD AUTO: 2.42 10*3/MM3 (ref 0.7–3.1)
LYMPHOCYTES NFR BLD AUTO: 25.8 % (ref 19.6–45.3)
MCH RBC QN AUTO: 27.9 PG (ref 26.6–33)
MCHC RBC AUTO-ENTMCNC: 33.6 G/DL (ref 31.5–35.7)
MCV RBC AUTO: 82.8 FL (ref 79–97)
MONOCYTES # BLD AUTO: 0.78 10*3/MM3 (ref 0.1–0.9)
MONOCYTES NFR BLD AUTO: 8.3 % (ref 5–12)
NEUTROPHILS NFR BLD AUTO: 5.7 10*3/MM3 (ref 1.7–7)
NEUTROPHILS NFR BLD AUTO: 61 % (ref 42.7–76)
NRBC BLD AUTO-RTO: 0 /100 WBC (ref 0–0.2)
PLATELET # BLD AUTO: 328 10*3/MM3 (ref 140–450)
PMV BLD AUTO: 9.4 FL (ref 6–12)
POTASSIUM SERPL-SCNC: 4.8 MMOL/L (ref 3.5–5.2)
PROT SERPL-MCNC: 7.4 G/DL (ref 6–8.5)
RBC # BLD AUTO: 5.35 10*6/MM3 (ref 4.14–5.8)
SODIUM SERPL-SCNC: 135 MMOL/L (ref 136–145)
T4 FREE SERPL-MCNC: 1.1 NG/DL (ref 0.92–1.68)
TIBC SERPL-MCNC: 402 MCG/DL (ref 298–536)
TRANSFERRIN SERPL-MCNC: 270 MG/DL (ref 200–360)
TRIGL SERPL-MCNC: 144 MG/DL (ref 0–150)
TSH SERPL DL<=0.05 MIU/L-ACNC: 1.9 UIU/ML (ref 0.27–4.2)
VLDLC SERPL-MCNC: 26 MG/DL (ref 5–40)
WBC NRBC COR # BLD AUTO: 9.37 10*3/MM3 (ref 3.4–10.8)

## 2025-01-21 PROCEDURE — 84443 ASSAY THYROID STIM HORMONE: CPT | Performed by: PHYSICIAN ASSISTANT

## 2025-01-21 PROCEDURE — 80061 LIPID PANEL: CPT | Performed by: PHYSICIAN ASSISTANT

## 2025-01-21 PROCEDURE — 3074F SYST BP LT 130 MM HG: CPT | Performed by: PHYSICIAN ASSISTANT

## 2025-01-21 PROCEDURE — 1160F RVW MEDS BY RX/DR IN RCRD: CPT | Performed by: PHYSICIAN ASSISTANT

## 2025-01-21 PROCEDURE — 85025 COMPLETE CBC W/AUTO DIFF WBC: CPT | Performed by: PHYSICIAN ASSISTANT

## 2025-01-21 PROCEDURE — 82306 VITAMIN D 25 HYDROXY: CPT | Performed by: PHYSICIAN ASSISTANT

## 2025-01-21 PROCEDURE — 99214 OFFICE O/P EST MOD 30 MIN: CPT | Performed by: PHYSICIAN ASSISTANT

## 2025-01-21 PROCEDURE — 84439 ASSAY OF FREE THYROXINE: CPT | Performed by: PHYSICIAN ASSISTANT

## 2025-01-21 PROCEDURE — G0103 PSA SCREENING: HCPCS | Performed by: PHYSICIAN ASSISTANT

## 2025-01-21 PROCEDURE — 36415 COLL VENOUS BLD VENIPUNCTURE: CPT | Performed by: PHYSICIAN ASSISTANT

## 2025-01-21 PROCEDURE — 1159F MED LIST DOCD IN RCRD: CPT | Performed by: PHYSICIAN ASSISTANT

## 2025-01-21 PROCEDURE — 83036 HEMOGLOBIN GLYCOSYLATED A1C: CPT | Performed by: PHYSICIAN ASSISTANT

## 2025-01-21 PROCEDURE — 84466 ASSAY OF TRANSFERRIN: CPT | Performed by: PHYSICIAN ASSISTANT

## 2025-01-21 PROCEDURE — 82728 ASSAY OF FERRITIN: CPT | Performed by: PHYSICIAN ASSISTANT

## 2025-01-21 PROCEDURE — 84403 ASSAY OF TOTAL TESTOSTERONE: CPT | Performed by: PHYSICIAN ASSISTANT

## 2025-01-21 PROCEDURE — 82607 VITAMIN B-12: CPT | Performed by: PHYSICIAN ASSISTANT

## 2025-01-21 PROCEDURE — 83540 ASSAY OF IRON: CPT | Performed by: PHYSICIAN ASSISTANT

## 2025-01-21 PROCEDURE — 80053 COMPREHEN METABOLIC PANEL: CPT | Performed by: PHYSICIAN ASSISTANT

## 2025-01-21 PROCEDURE — 3078F DIAST BP <80 MM HG: CPT | Performed by: PHYSICIAN ASSISTANT

## 2025-01-21 NOTE — PROGRESS NOTES
"    Subjective        Chief Complaint  Fatigue    Subjective      Alfonso Barnett is a 54 y.o. male who presents today to [facility] for Fatigue. He has a past medical history of Anxiety, Depression, Hyperlipidemia, Hypertension, Vitamin D and b12 deficiency.     Fatigue  Alfonso reports 1 to 2 years of feeling poorly with generalized fatigue and just \"feeling awful.\"  He reports this has gradually increased to the point where he does not feel well at all.  He denies any fever, chills, weight loss, cough, GI upset, dysuria.  He chronically has difficulty sleeping and he is following with psychiatry, prescribed Elavil and Zyprexa.  Denies any recent changes to his medications.  Nothing started or discontinued.  Denies any known history order evaluation for sleep apnea.  He has vitamin D deficiency and reports taking his medication regularly.  He is on statin therapy, denies any myalgias.      Current Outpatient Medications:     amitriptyline (ELAVIL) 50 MG tablet, Take 1 tablet by mouth every night at bedtime., Disp: 30 tablet, Rfl: 0    aspirin 81 MG EC tablet, Take 1 tablet by mouth Daily., Disp: 90 tablet, Rfl: 2    atenolol (TENORMIN) 25 MG tablet, Take 1 tablet by mouth Daily., Disp: 30 tablet, Rfl: 5    cetirizine (zyrTEC) 10 MG tablet, Take 1 tablet by mouth Daily As Needed for Allergies. 0, Disp: 30 tablet, Rfl: 0    lisinopril (PRINIVIL,ZESTRIL) 10 MG tablet, Take 1 tablet by mouth Daily., Disp: 90 tablet, Rfl: 2    OLANZapine (zyPREXA) 10 MG tablet, Take 1 tablet by mouth Every Night., Disp: 30 tablet, Rfl: 1    sildenafil (REVATIO) 20 MG tablet, Take 1-2 tablets by mouth Daily As Needed (ED)., Disp: 60 tablet, Rfl: 5    simvastatin (ZOCOR) 40 MG tablet, Take 1 tablet by mouth Every Night., Disp: 90 tablet, Rfl: 2    vitamin D (ERGOCALCIFEROL) 1.25 MG (51245 UT) capsule capsule, Take 1 capsule by mouth 1 (One) Time Per Week., Disp: 4 capsule, Rfl: 5      No Known " "Allergies    Objective     Objective   Vital Signs:  /70   Pulse 88   Temp 99.3 °F (37.4 °C) (Oral)   Ht 177.8 cm (70\")   Wt 102 kg (224 lb)   SpO2 97%   BMI 32.14 kg/m²   Estimated body mass index is 32.14 kg/m² as calculated from the following:    Height as of this encounter: 177.8 cm (70\").    Weight as of this encounter: 102 kg (224 lb).    Past Medical History:   Diagnosis Date    Anxiety     Depression     Diabetes mellitus     Hyperlipidemia     Hypertension     Itching with irritation 11/5/2019     Past Surgical History:   Procedure Laterality Date    APPENDECTOMY       Social History     Socioeconomic History    Marital status:    Tobacco Use    Smoking status: Never     Passive exposure: Never    Smokeless tobacco: Never   Vaping Use    Vaping status: Never Used   Substance and Sexual Activity    Alcohol use: No    Drug use: No    Sexual activity: Defer     Partners: Female      Physical Exam  Vitals and nursing note reviewed.   Constitutional:       General: He is not in acute distress.     Appearance: He is well-developed. He is not diaphoretic.   HENT:      Head: Normocephalic and atraumatic.   Eyes:      General: No scleral icterus.        Right eye: No discharge.         Left eye: No discharge.      Conjunctiva/sclera: Conjunctivae normal.   Cardiovascular:      Rate and Rhythm: Normal rate and regular rhythm.      Heart sounds: Normal heart sounds. No murmur heard.     No friction rub. No gallop.   Pulmonary:      Effort: Pulmonary effort is normal. No respiratory distress.      Breath sounds: Normal breath sounds. No wheezing or rales.   Chest:      Chest wall: No tenderness.   Musculoskeletal:         General: Normal range of motion.      Cervical back: Normal range of motion and neck supple.   Skin:     General: Skin is warm and dry.      Coloration: Skin is not pale.      Findings: No erythema or rash.   Neurological:      Mental Status: He is alert and oriented to person, " place, and time.   Psychiatric:         Behavior: Behavior normal.        Result Review :  The following data was reviewed by: JAYLA Castaneda on 01/21/2025:  Hemoglobin A1C   Date Value Ref Range Status   09/04/2024 5.90 (H) 4.80 - 5.60 % Final     TSH   Date Value Ref Range Status   09/04/2024 1.410 0.270 - 4.200 uIU/mL Final     HDL Cholesterol   Date Value Ref Range Status   09/04/2024 59 40 - 60 mg/dL Final     LDL Cholesterol    Date Value Ref Range Status   09/04/2024 188 (H) 0 - 100 mg/dL Final     Triglycerides   Date Value Ref Range Status   09/04/2024 119 0 - 150 mg/dL Final     Total Cholesterol   Date Value Ref Range Status   03/23/2016 181 0 - 200 mg/dL Final     Comment:       Cholesterol Reference Range:      Desirable                 < 200mg/dl      Borderline High        200-239mg/dl      High Risk                 > 239mg/dl    Cholesterol Reference Range:      Desirable                 < 200mg/dl      Borderline High        200-239mg/dl      High Risk                 > 239mg/dl       1,25-Dihydroxy, Vitamin D   Date Value Ref Range Status   10/19/2022 39.5 24.8 - 81.5 pg/mL Final     Comment:                   **Please note reference interval change**           Assessment / Plan         Assessment   Diagnoses and all orders for this visit:    1. Chronic fatigue (Primary)  2. Health maintenance examination  3. Vitamin D deficiency  -Vital signs are normal today.  No major changes in weight.  -He does report chronic poor sleep, continue amitriptyline and olanzapine for now.  Keep upcoming follow-up with psychiatry on 2/10/2025.  -Continue vitamin D supplementation, update vitamin D level.  -Laboratory testing obtained today and listed below.  -Evaluate further with in-home sleep study.  -Return to clinic if no improvement noted or if symptoms are worsening.  -     CBC & Differential  -     Comprehensive Metabolic Panel  -     TSH  -     Vitamin B12  -     T4, free  -     Iron Profile  -      Ferritin  -     Testosterone  -     Home Sleep Study; Future  -     Hemoglobin A1c  -     Vitamin D 25 hydroxy    4. Prostate cancer screening  -We discussed risk and benefits of prostate cancer screening.  He is agreeable.  -     PSA SCREENING    5. Dyslipidemia  -Most recent lipid panel in September 2024 showed poor control.  Update FLP today.  -For now, continue simvastatin.  -     Lipid Panel    6. Iron deficiency  -     Iron Profile  -     Ferritin    7. Obstructive sleep apnea syndrome  -     Home Sleep Study; Future         No orders of the defined types were placed in this encounter.    Follow Up   Return for Follow up with PCP as scheduled on 2/3..    Patient was given instructions and counseling regarding his condition or for health maintenance advice. Please see specific information pulled into the AVS if appropriate.       This document has been electronically signed by JAYLA Castaneda   January 21, 2025 10:34 EST    Dictated Utilizing Dragon Dictation: Part of this note may be an electronic transcription/translation of spoken language to printed text using the Dragon Dictation System.

## 2025-01-22 LAB
25(OH)D3 SERPL-MCNC: 43.3 NG/ML (ref 30–100)
PSA SERPL-MCNC: 1.22 NG/ML (ref 0–4)
TESTOST SERPL-MCNC: 474 NG/DL (ref 193–740)
VIT B12 BLD-MCNC: 363 PG/ML (ref 211–946)

## 2025-02-10 DIAGNOSIS — F99 INSOMNIA DUE TO OTHER MENTAL DISORDER: ICD-10-CM

## 2025-02-10 DIAGNOSIS — F20.0 PARANOID SCHIZOPHRENIA: ICD-10-CM

## 2025-02-10 DIAGNOSIS — F33.1 MAJOR DEPRESSIVE DISORDER, RECURRENT EPISODE, MODERATE: ICD-10-CM

## 2025-02-10 DIAGNOSIS — F51.05 INSOMNIA DUE TO OTHER MENTAL DISORDER: ICD-10-CM

## 2025-02-10 DIAGNOSIS — F41.1 GENERALIZED ANXIETY DISORDER: ICD-10-CM

## 2025-02-10 RX ORDER — AMITRIPTYLINE HYDROCHLORIDE 50 MG/1
50 TABLET ORAL
Qty: 30 TABLET | Refills: 0 | Status: SHIPPED | OUTPATIENT
Start: 2025-02-10

## 2025-02-10 RX ORDER — OLANZAPINE 10 MG/1
10 TABLET ORAL NIGHTLY
Qty: 30 TABLET | Refills: 1 | Status: SHIPPED | OUTPATIENT
Start: 2025-02-10

## 2025-02-25 ENCOUNTER — OFFICE VISIT (OUTPATIENT)
Dept: FAMILY MEDICINE CLINIC | Facility: CLINIC | Age: 55
End: 2025-02-25
Payer: MEDICARE

## 2025-02-25 VITALS
RESPIRATION RATE: 14 BRPM | SYSTOLIC BLOOD PRESSURE: 118 MMHG | HEART RATE: 85 BPM | BODY MASS INDEX: 31.92 KG/M2 | TEMPERATURE: 97.8 F | WEIGHT: 223 LBS | HEIGHT: 70 IN | OXYGEN SATURATION: 97 % | DIASTOLIC BLOOD PRESSURE: 80 MMHG

## 2025-02-25 DIAGNOSIS — I10 ESSENTIAL HYPERTENSION: Chronic | ICD-10-CM

## 2025-02-25 DIAGNOSIS — E78.5 DYSLIPIDEMIA: Chronic | ICD-10-CM

## 2025-02-25 DIAGNOSIS — I79.8 OTHER DISORDERS OF ARTERIES, ARTERIOLES AND CAPILLARIES IN DISEASES CLASSIFIED ELSEWHERE: Chronic | ICD-10-CM

## 2025-02-25 DIAGNOSIS — M25.511 RIGHT SHOULDER PAIN, UNSPECIFIED CHRONICITY: Chronic | ICD-10-CM

## 2025-02-25 DIAGNOSIS — R73.03 PREDIABETES: Primary | ICD-10-CM

## 2025-02-25 DIAGNOSIS — E55.9 VITAMIN D DEFICIENCY: Chronic | ICD-10-CM

## 2025-02-25 PROCEDURE — 3079F DIAST BP 80-89 MM HG: CPT | Performed by: NURSE PRACTITIONER

## 2025-02-25 PROCEDURE — 3044F HG A1C LEVEL LT 7.0%: CPT | Performed by: NURSE PRACTITIONER

## 2025-02-25 PROCEDURE — 1159F MED LIST DOCD IN RCRD: CPT | Performed by: NURSE PRACTITIONER

## 2025-02-25 PROCEDURE — 3074F SYST BP LT 130 MM HG: CPT | Performed by: NURSE PRACTITIONER

## 2025-02-25 PROCEDURE — 99214 OFFICE O/P EST MOD 30 MIN: CPT | Performed by: NURSE PRACTITIONER

## 2025-02-25 PROCEDURE — G2211 COMPLEX E/M VISIT ADD ON: HCPCS | Performed by: NURSE PRACTITIONER

## 2025-02-25 PROCEDURE — 1160F RVW MEDS BY RX/DR IN RCRD: CPT | Performed by: NURSE PRACTITIONER

## 2025-02-25 RX ORDER — LISINOPRIL 10 MG/1
10 TABLET ORAL DAILY
Qty: 90 TABLET | Refills: 1 | Status: SHIPPED | OUTPATIENT
Start: 2025-02-25

## 2025-02-25 RX ORDER — SIMVASTATIN 40 MG
40 TABLET ORAL NIGHTLY
Qty: 90 TABLET | Refills: 1 | Status: SHIPPED | OUTPATIENT
Start: 2025-02-25

## 2025-02-25 RX ORDER — ATENOLOL 25 MG/1
25 TABLET ORAL DAILY
Qty: 90 TABLET | Refills: 1 | Status: SHIPPED | OUTPATIENT
Start: 2025-02-25

## 2025-02-25 RX ORDER — ASPIRIN 81 MG/1
81 TABLET ORAL DAILY
Qty: 90 TABLET | Refills: 1 | Status: SHIPPED | OUTPATIENT
Start: 2025-02-25

## 2025-02-25 RX ORDER — ERGOCALCIFEROL 1.25 MG/1
50000 CAPSULE, LIQUID FILLED ORAL WEEKLY
Qty: 12 CAPSULE | Refills: 0 | Status: SHIPPED | OUTPATIENT
Start: 2025-02-25

## 2025-02-25 RX ORDER — SILDENAFIL CITRATE 20 MG/1
20-40 TABLET ORAL DAILY PRN
Qty: 60 TABLET | Refills: 1 | Status: SHIPPED | OUTPATIENT
Start: 2025-02-25

## 2025-02-25 NOTE — PROGRESS NOTES
History of Present Illness  Alfonso Barnett is a 54 y.o. male who presents to [facility] today pertaining to his medical problems which include hypertension, Dyslipidemia and vitamin deficiencies.  In addition, he is complaining of extreme fatigue and worsening right shoulder pain.    Hypertension  He is currently prescribed atenolol 25 mg and lisinopril 10 mg.  In addition, he is taking aspirin 81 mg daily with no concerns for bleeding or bruising.  He is tolerating these well.  Risk factors for coronary artery disease include dyslipidemia and male gender.   Lab Results   Component Value Date    GLUCOSE 125 (H) 01/21/2025    BUN 11 01/21/2025    CREATININE 1.00 01/21/2025     (L) 01/21/2025    K 4.8 01/21/2025     01/21/2025    CALCIUM 9.4 01/21/2025    PROTEINTOT 7.4 01/21/2025    ALBUMIN 4.4 01/21/2025    ALT 22 01/21/2025    AST 21 01/21/2025    ALKPHOS 88 01/21/2025    BILITOT 0.2 01/21/2025    GLOB 3.0 01/21/2025    AGRATIO 1.5 01/21/2025    BCR 11.0 01/21/2025    ANIONGAP 11.0 01/21/2025    EGFR 88.3 01/21/2025      Dyslipidemia  Prescribed simvastatin 40 mg.  Reports he has not had in several months.  Risk factors for coronary artery disease include dyslipidemia, hypertension and male sex.   Lab Results   Component Value Date    CHOL 264 (H) 01/21/2025    CHOL 268 (H) 09/04/2024    CHOL 216 (H) 01/12/2024     Lab Results   Component Value Date    TRIG 144 01/21/2025    TRIG 119 09/04/2024    TRIG 274 (H) 01/12/2024     Lab Results   Component Value Date    HDL 57 01/21/2025    HDL 59 09/04/2024    HDL 42 01/12/2024     Lab Results   Component Value Date     (H) 01/21/2025     (H) 09/04/2024     (H) 01/12/2024      Mental health issues  Currently under the care of of the [facility] provider.  He is currently on Zyprexa 5 mg nightly and Elavil 50 mg.  He reports his mood and behaviors are stable.      The following portions of the  "patient's history were reviewed and updated as appropriate: allergies, current medications, past family history, past medical history, past social history, past surgical history and problem list.    Review of Systems   Constitutional:  Positive for fatigue. Negative for activity change, appetite change, chills, fever and unexpected weight change.   HENT:  Negative for ear discharge, ear pain, nosebleeds, sinus pressure, sinus pain, sore throat, tinnitus and trouble swallowing.    Eyes:  Negative for pain, discharge, redness, itching and visual disturbance.   Respiratory:  Negative for shortness of breath and wheezing.    Cardiovascular:  Negative for chest pain, palpitations and leg swelling.   Gastrointestinal:  Negative for nausea and vomiting.   Endocrine: Negative for cold intolerance, heat intolerance, polydipsia, polyphagia and polyuria.   Musculoskeletal:  Positive for arthralgias (Right shoulder).   Skin:  Negative for color change and rash.   Allergic/Immunologic: Positive for environmental allergies.   Neurological:  Negative for dizziness, tremors, speech difficulty, weakness and light-headedness.   Hematological:  Negative for adenopathy.   Psychiatric/Behavioral:  Negative for confusion and decreased concentration. Sleep disturbance: Stable with medication.The patient is not nervous/anxious.    All other systems reviewed and are negative.      Vital signs:  /80 (BP Location: Right arm, Patient Position: Sitting, Cuff Size: Adult)   Pulse 85   Temp 97.8 °F (36.6 °C) (Temporal)   Resp 14   Ht 177.8 cm (70\")   Wt 101 kg (223 lb)   SpO2 97%   BMI 32.00 kg/m²     Physical Exam    Result Review :   Results for orders placed or performed in visit on 01/21/25   Comprehensive Metabolic Panel    Collection Time: 01/21/25 10:24 AM    Specimen: Arm, Right; Blood   Result Value Ref Range    Glucose 125 (H) 65 - 99 mg/dL    BUN 11 6 - 20 mg/dL    Creatinine 1.00 0.76 - 1.27 mg/dL    Sodium 135 (L) 136 - " 145 mmol/L    Potassium 4.8 3.5 - 5.2 mmol/L    Chloride 101 98 - 107 mmol/L    CO2 23.0 22.0 - 29.0 mmol/L    Calcium 9.4 8.6 - 10.5 mg/dL    Total Protein 7.4 6.0 - 8.5 g/dL    Albumin 4.4 3.5 - 5.2 g/dL    ALT (SGPT) 22 1 - 41 U/L    AST (SGOT) 21 1 - 40 U/L    Alkaline Phosphatase 88 39 - 117 U/L    Total Bilirubin 0.2 0.0 - 1.2 mg/dL    Globulin 3.0 gm/dL    A/G Ratio 1.5 g/dL    BUN/Creatinine Ratio 11.0 7.0 - 25.0    Anion Gap 11.0 5.0 - 15.0 mmol/L    eGFR 88.3 >60.0 mL/min/1.73   Lipid Panel    Collection Time: 01/21/25 10:24 AM    Specimen: Arm, Right; Blood   Result Value Ref Range    Total Cholesterol 264 (H) 0 - 200 mg/dL    Triglycerides 144 0 - 150 mg/dL    HDL Cholesterol 57 40 - 60 mg/dL    LDL Cholesterol  181 (H) 0 - 100 mg/dL    VLDL Cholesterol 26 5 - 40 mg/dL    LDL/HDL Ratio 3.13    TSH    Collection Time: 01/21/25 10:24 AM    Specimen: Arm, Right; Blood   Result Value Ref Range    TSH 1.900 0.270 - 4.200 uIU/mL   Hemoglobin A1c    Collection Time: 01/21/25 10:24 AM    Specimen: Arm, Right; Blood   Result Value Ref Range    Hemoglobin A1C 6.10 (H) 4.80 - 5.60 %   Vitamin D 25 hydroxy    Collection Time: 01/21/25 10:24 AM    Specimen: Arm, Right; Blood   Result Value Ref Range    25 Hydroxy, Vitamin D 43.3 30.0 - 100.0 ng/ml   Vitamin B12    Collection Time: 01/21/25 10:24 AM    Specimen: Arm, Right; Blood   Result Value Ref Range    Vitamin B-12 363 211 - 946 pg/mL   T4, free    Collection Time: 01/21/25 10:24 AM    Specimen: Arm, Right; Blood   Result Value Ref Range    Free T4 1.10 0.92 - 1.68 ng/dL   Iron Profile    Collection Time: 01/21/25 10:24 AM    Specimen: Arm, Right; Blood   Result Value Ref Range    Iron 107 59 - 158 mcg/dL    Iron Saturation (TSAT) 27 20 - 50 %    Transferrin 270 200 - 360 mg/dL    TIBC 402 298 - 536 mcg/dL   Ferritin    Collection Time: 01/21/25 10:24 AM    Specimen: Arm, Right; Blood   Result Value Ref Range    Ferritin 72.91 30.00 - 400.00 ng/mL   PSA SCREENING     Collection Time: 01/21/25 10:24 AM    Specimen: Arm, Right; Blood   Result Value Ref Range    PSA 1.220 0.000 - 4.000 ng/mL   Testosterone    Collection Time: 01/21/25 10:24 AM    Specimen: Arm, Right; Blood   Result Value Ref Range    Testosterone, Total 474.00 193.00 - 740.00 ng/dL   CBC Auto Differential    Collection Time: 01/21/25 10:24 AM    Specimen: Arm, Right; Blood   Result Value Ref Range    WBC 9.37 3.40 - 10.80 10*3/mm3    RBC 5.35 4.14 - 5.80 10*6/mm3    Hemoglobin 14.9 13.0 - 17.7 g/dL    Hematocrit 44.3 37.5 - 51.0 %    MCV 82.8 79.0 - 97.0 fL    MCH 27.9 26.6 - 33.0 pg    MCHC 33.6 31.5 - 35.7 g/dL    RDW 12.5 12.3 - 15.4 %    RDW-SD 37.8 37.0 - 54.0 fl    MPV 9.4 6.0 - 12.0 fL    Platelets 328 140 - 450 10*3/mm3    Neutrophil % 61.0 42.7 - 76.0 %    Lymphocyte % 25.8 19.6 - 45.3 %    Monocyte % 8.3 5.0 - 12.0 %    Eosinophil % 3.9 0.3 - 6.2 %    Basophil % 0.7 0.0 - 1.5 %    Immature Grans % 0.3 0.0 - 0.5 %    Neutrophils, Absolute 5.70 1.70 - 7.00 10*3/mm3    Lymphocytes, Absolute 2.42 0.70 - 3.10 10*3/mm3    Monocytes, Absolute 0.78 0.10 - 0.90 10*3/mm3    Eosinophils, Absolute 0.37 0.00 - 0.40 10*3/mm3    Basophils, Absolute 0.07 0.00 - 0.20 10*3/mm3    Immature Grans, Absolute 0.03 0.00 - 0.05 10*3/mm3    nRBC 0.0 0.0 - 0.2 /100 WBC     Assessment & Plan     Diagnoses and all orders for this visit:    1. Prediabetes (Primary)  Comments:  Treatment options discussed.  At this time he would like to make lifestyle changes including his nutrition plan which we discussed.  Glucose monitor provided  Orders:  -     Cancel: Microalbumin / Creatinine Urine Ratio - Urine, Clean Catch; Future  -     sildenafil (REVATIO) 20 MG tablet; Take 1-2 tablets by mouth Daily As Needed (ED).  Dispense: 60 tablet; Refill: 1  -     Blood Glucose Monitoring Suppl w/Device kit; Use 1 kit Daily.  Dispense: 1 each; Refill: 0  -     glucose blood test strip; Monitor daily  Dispense: 100 each; Refill: 3  -     Lancets  Micro Thin 33G misc; Use 1 Device Daily.  Dispense: 100 each; Refill: 3  -     Microalbumin / Creatinine Urine Ratio - Urine, Clean Catch; Future  -     Microalbumin / Creatinine Urine Ratio - Urine, Clean Catch    2. Essential hypertension  Comments:  Continue lisinopril and atenolol, weight loss recommended  Orders:  -     atenolol (TENORMIN) 25 MG tablet; Take 1 tablet by mouth Daily.  Dispense: 90 tablet; Refill: 1  -     lisinopril (PRINIVIL,ZESTRIL) 10 MG tablet; Take 1 tablet by mouth Daily.  Dispense: 90 tablet; Refill: 1    3. Dyslipidemia  Comments:  Continue statin  Orders:  -     simvastatin (ZOCOR) 40 MG tablet; Take 1 tablet by mouth Every Night.  Dispense: 90 tablet; Refill: 1    4. 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  Dispense: 90 tablet; Refill: 1    5. Vitamin D deficiency  Comments:  Vit d level reviewed  today.  vit D prescription sent to pharmacy  Orders:  -     vitamin D (ERGOCALCIFEROL) 1.25 MG (14037 UT) capsule capsule; Take 1 capsule by mouth 1 (One) Time Per Week.  Dispense: 12 capsule; Refill: 0    6. Right shoulder pain, unspecified chronicity  Comments:  Right shoulder x-ray ordered.  Orders:  -     XR Shoulder 2+ View Right; Future      Education Documentation  Newly diagnosed with prediabetes.  Counseled him regarding lifestyle modifications.  At this time he would like to focus on eliminating soft drinks and refined carbohydrates.  Blood glucose monitor prescription sent to his pharmacy.  Education Comments  Alfonso is very motivated to make changes.  Positive outcomes expected    Follow Up In April  Findings and recommendations discussed with Alfonso. Reviewed test results. Lifestyle modifications reinforced including nutrition and activity recommendations.  He will follow up in April sooner if problems/concerns occur.  lAfonso was  given instructions and counseling regarding his condition or for health maintenance advice. Please see specific information pulled into the AVS if appropriate.    This document has been electronically signed by:

## 2025-02-26 LAB
ALBUMIN/CREAT UR: 4 MG/G CREAT (ref 0–29)
CREAT UR-MCNC: 113 MG/DL
MICROALBUMIN UR-MCNC: 4.4 UG/ML

## 2025-03-05 ENCOUNTER — OFFICE VISIT (OUTPATIENT)
Dept: FAMILY MEDICINE CLINIC | Facility: CLINIC | Age: 55
End: 2025-03-05
Payer: MEDICARE

## 2025-03-05 VITALS
BODY MASS INDEX: 32.35 KG/M2 | HEIGHT: 70 IN | WEIGHT: 226 LBS | HEART RATE: 90 BPM | DIASTOLIC BLOOD PRESSURE: 70 MMHG | OXYGEN SATURATION: 99 % | RESPIRATION RATE: 14 BRPM | SYSTOLIC BLOOD PRESSURE: 120 MMHG | TEMPERATURE: 97.5 F

## 2025-03-05 DIAGNOSIS — M25.511 CHRONIC RIGHT SHOULDER PAIN: Primary | Chronic | ICD-10-CM

## 2025-03-05 DIAGNOSIS — G89.29 CHRONIC RIGHT SHOULDER PAIN: Primary | Chronic | ICD-10-CM

## 2025-03-05 DIAGNOSIS — E78.5 DYSLIPIDEMIA: Chronic | ICD-10-CM

## 2025-03-05 DIAGNOSIS — R73.03 PREDIABETES: ICD-10-CM

## 2025-03-05 DIAGNOSIS — I10 ESSENTIAL HYPERTENSION: Chronic | ICD-10-CM

## 2025-03-05 NOTE — PROGRESS NOTES
History of Present Illness  Alfonso Barnett is a 54 y.o. male who presents to [facility] today pertaining to his shoulder pain which is worsening.  Additional medical problems which include hypertension, Dyslipidemia and vitamin deficiencies.      Hypertension  He is currently prescribed atenolol 25 mg and lisinopril 10 mg.  In addition, he is taking aspirin 81 mg daily with no concerns for bleeding or bruising.  He is tolerating these well.  Risk factors for coronary artery disease include dyslipidemia and male gender.   Lab Results   Component Value Date    GLUCOSE 125 (H) 01/21/2025    BUN 11 01/21/2025    CREATININE 1.00 01/21/2025     (L) 01/21/2025    K 4.8 01/21/2025     01/21/2025    CALCIUM 9.4 01/21/2025    PROTEINTOT 7.4 01/21/2025    ALBUMIN 4.4 01/21/2025    ALT 22 01/21/2025    AST 21 01/21/2025    ALKPHOS 88 01/21/2025    BILITOT 0.2 01/21/2025    GLOB 3.0 01/21/2025    AGRATIO 1.5 01/21/2025    BCR 11.0 01/21/2025    ANIONGAP 11.0 01/21/2025    EGFR 88.3 01/21/2025      Dyslipidemia  Prescribed simvastatin 40 mg.   Risk factors for coronary artery disease include dyslipidemia, hypertension and male sex.   Lab Results   Component Value Date    CHOL 264 (H) 01/21/2025    CHOL 268 (H) 09/04/2024    CHOL 216 (H) 01/12/2024     Lab Results   Component Value Date    TRIG 144 01/21/2025    TRIG 119 09/04/2024    TRIG 274 (H) 01/12/2024     Lab Results   Component Value Date    HDL 57 01/21/2025    HDL 59 09/04/2024    HDL 42 01/12/2024     Lab Results   Component Value Date     (H) 01/21/2025     (H) 09/04/2024     (H) 01/12/2024      Mental health issues  Currently under the care of of the [facility] provider.  He is currently on Zyprexa 5 mg nightly and Elavil 50 mg.  He reports his mood and behaviors are stable.    The following portions of the patient's history were reviewed and updated as appropriate: allergies, current  "medications, past family history, past medical history, past social history, past surgical history and problem list.    Review of Systems   Constitutional:  Positive for fatigue. Negative for activity change, appetite change, chills, fever and unexpected weight change.   HENT:  Negative for ear discharge, ear pain, nosebleeds, sinus pressure, sinus pain, sore throat, tinnitus and trouble swallowing.    Eyes:  Negative for pain, discharge, redness, itching and visual disturbance.   Respiratory:  Negative for shortness of breath and wheezing.    Cardiovascular:  Negative for chest pain, palpitations and leg swelling.   Gastrointestinal:  Negative for nausea and vomiting.   Endocrine: Negative for cold intolerance, heat intolerance, polydipsia, polyphagia and polyuria.   Musculoskeletal:  Positive for arthralgias (Right shoulder).   Skin:  Negative for color change and rash.   Allergic/Immunologic: Positive for environmental allergies.   Neurological:  Negative for dizziness, tremors, speech difficulty, weakness and light-headedness.   Hematological:  Negative for adenopathy.   Psychiatric/Behavioral:  Negative for confusion and decreased concentration. Sleep disturbance: Stable with medication.The patient is not nervous/anxious.    All other systems reviewed and are negative.    Vital signs:  /70 (BP Location: Left arm, Patient Position: Sitting, Cuff Size: Adult)   Pulse 90   Temp 97.5 °F (36.4 °C) (Temporal)   Resp 14   Ht 177.8 cm (70\")   Wt 103 kg (226 lb)   SpO2 99%   BMI 32.43 kg/m²     Physical Exam  Vitals and nursing note reviewed.   Constitutional:       General: He is not in acute distress.     Appearance: He is well-developed.   HENT:      Head: Normocephalic.      Nose: Nose normal.      Mouth/Throat:      Pharynx: No oropharyngeal exudate.   Eyes:      General:         Right eye: No discharge.         Left eye: No discharge.      Conjunctiva/sclera: Conjunctivae normal.      Pupils: Pupils " are equal, round, and reactive to light.   Cardiovascular:      Rate and Rhythm: Normal rate and regular rhythm.      Heart sounds: Normal heart sounds. No murmur heard.     No friction rub.   Pulmonary:      Effort: Pulmonary effort is normal. No respiratory distress.      Breath sounds: Normal breath sounds. No wheezing or rales.   Musculoskeletal:         General: No tenderness.      Right shoulder: Tenderness present. Decreased range of motion.      Cervical back: Normal range of motion and neck supple.   Lymphadenopathy:      Head:      Right side of head: No tonsillar or preauricular adenopathy.      Left side of head: No tonsillar or preauricular adenopathy.      Cervical: No cervical adenopathy.   Skin:     General: Skin is warm and dry.      Findings: No erythema or rash.   Neurological:      Mental Status: He is alert and oriented to person, place, and time.     Result Review :   He reported he had an MRI of his shoulder.  Requested from [facility] in [place].  He had a MRI cervical spine without contrast on 3/10/2016.  Right shoulder x-ray completed at [facility] 2/26/2025 reviewed    Assessment & Plan     Diagnoses and all orders for this visit:    1. Chronic right shoulder pain (Primary)  Comments:  Treatment options reviewed.  Reviewed recent x-ray.  PT referral for evaluation and treatment  Orders:  -     Ambulatory Referral to Physical Therapy for Evaluation & Treatment    2. Essential hypertension  Comments:  Continue atenolol and lisinopril    3. Dyslipidemia  Comments:  Continue simvastatin    4. Prediabetes  Comments:  Continue lifestyle modifications to improve glycemic control.  Will continue to monitor    I spent 40 minutes caring for Alfonso on this date of service. This time includes time spent by me in the following activities:preparing for the visit, reviewing tests, obtaining and/or reviewing a separately obtained history, performing a medically appropriate examination and/or  evaluation , counseling and educating the patient/family/caregiver, referring and communicating with other health care professionals , documenting information in the medical record, independently interpreting results and communicating that information with the patient/family/caregiver, and care coordination  Follow Up In April  Findings and recommendations discussed with Alfonso. Reviewed x-ray results. Lifestyle modifications reinforced including nutrition and activity recommendations.  Alfonso will follow up in April sooner if problems/concerns occur.  Alfonso was given instructions and counseling regarding his condition or for health maintenance advice. Please see specific information pulled into the AVS if appropriate.    This document has been electronically signed by:

## 2025-03-07 ENCOUNTER — OFFICE VISIT (OUTPATIENT)
Dept: PSYCHIATRY | Facility: CLINIC | Age: 55
End: 2025-03-07
Payer: MEDICARE

## 2025-03-07 VITALS
SYSTOLIC BLOOD PRESSURE: 108 MMHG | HEART RATE: 91 BPM | WEIGHT: 224.4 LBS | BODY MASS INDEX: 32.2 KG/M2 | DIASTOLIC BLOOD PRESSURE: 72 MMHG | OXYGEN SATURATION: 96 %

## 2025-03-07 DIAGNOSIS — F99 INSOMNIA DUE TO OTHER MENTAL DISORDER: ICD-10-CM

## 2025-03-07 DIAGNOSIS — F33.1 MAJOR DEPRESSIVE DISORDER, RECURRENT EPISODE, MODERATE: ICD-10-CM

## 2025-03-07 DIAGNOSIS — F41.1 GENERALIZED ANXIETY DISORDER: ICD-10-CM

## 2025-03-07 DIAGNOSIS — F51.05 INSOMNIA DUE TO OTHER MENTAL DISORDER: ICD-10-CM

## 2025-03-07 DIAGNOSIS — F20.0 PARANOID SCHIZOPHRENIA: ICD-10-CM

## 2025-03-07 RX ORDER — AMITRIPTYLINE HYDROCHLORIDE 50 MG/1
50 TABLET ORAL
Qty: 30 TABLET | Refills: 1 | Status: SHIPPED | OUTPATIENT
Start: 2025-03-07

## 2025-03-07 RX ORDER — OLANZAPINE 10 MG/1
10 TABLET ORAL NIGHTLY
Qty: 30 TABLET | Refills: 1 | Status: SHIPPED | OUTPATIENT
Start: 2025-03-07

## 2025-03-07 NOTE — PROGRESS NOTES
Subjective   Alfonso Barnett is a 54 y.o. male is here today for medication management follow-up.    Chief Complaint:  schizophrenia     History of Present Illness:    Patient presents today for follow up for medication management for schizophrenia. Patient states doing well mood wise. Patient states feeling ok but not sleeping much. Patient states by the time get in bed then having to get up. Patient states waking up around 6 am but not going to sleep until after midnight. Patient states have some dreams but not often. Denies any mood swings. Patient states the paranoia is doing ok and not had any problems. Patient reports currently depression is at a 5 on a 0-10 scale with 10 being the worst. Patient states saw ex wife after not seeing her for a year and it has messed with head some. Patient reports having some worries with daughter. Patient states started seeing a therapist again after dealing with daughter and ex wife. Patient reports anxiety is at a 8 on a 0-10 scale with 10 being the worst. Patient denies any panic attacks. Patient reports appetite is good. Patient states eating twice a day. Patient denies any auditory or visual hallucinations. Patient adamantly denies any thoughts to harm self or others. Patient reports medication compliance and denies any side effects. Patient denies any medical changes since last visit.   The following portions of the patient's history were reviewed and updated as appropriate: allergies, current medications, past family history, past medical history, past social history, past surgical history, and problem list.    Review of Systems   Constitutional: Negative.    Respiratory: Negative.     Cardiovascular: Negative.    Gastrointestinal: Negative.    Neurological: Negative.    Psychiatric/Behavioral:  Positive for dysphoric mood and sleep disturbance. The patient is nervous/anxious.        Objective   Physical Exam  Vitals reviewed.   Constitutional:       Appearance: Normal  appearance. He is well-developed and well-groomed.   Neurological:      Mental Status: He is alert.   Psychiatric:         Attention and Perception: Attention and perception normal.         Mood and Affect: Affect normal. Mood is anxious.         Speech: Speech normal.         Behavior: Behavior normal. Behavior is cooperative.         Thought Content: Thought content normal.         Cognition and Memory: Cognition and memory normal.         Judgment: Judgment normal.       Blood pressure 108/72, pulse 91, weight 102 kg (224 lb 6.4 oz), SpO2 96%.Body mass index is 32.2 kg/m².    No Known Allergies    Medication List:   Current Outpatient Medications   Medication Sig Dispense Refill    amitriptyline (ELAVIL) 50 MG tablet Take 1 tablet by mouth every night at bedtime. 30 tablet 1    OLANZapine (zyPREXA) 10 MG tablet Take 1 tablet by mouth Every Night. 30 tablet 1    aspirin 81 MG EC tablet Take 1 tablet by mouth Daily. 90 tablet 1    atenolol (TENORMIN) 25 MG tablet Take 1 tablet by mouth Daily. 90 tablet 1    Blood Glucose Monitoring Suppl w/Device kit Use 1 kit Daily. 1 each 0    cetirizine (zyrTEC) 10 MG tablet Take 1 tablet by mouth Daily As Needed for Allergies. 0 30 tablet 0    glucose blood test strip Monitor daily 100 each 3    Lancets Micro Thin 33G misc Use 1 Device Daily. 100 each 3    lisinopril (PRINIVIL,ZESTRIL) 10 MG tablet Take 1 tablet by mouth Daily. 90 tablet 1    sildenafil (REVATIO) 20 MG tablet Take 1-2 tablets by mouth Daily As Needed (ED). 60 tablet 1    simvastatin (ZOCOR) 40 MG tablet Take 1 tablet by mouth Every Night. 90 tablet 1    vitamin D (ERGOCALCIFEROL) 1.25 MG (35628 UT) capsule capsule Take 1 capsule by mouth 1 (One) Time Per Week. 12 capsule 0     No current facility-administered medications for this visit.       Mental Status Exam:   Hygiene:   good  Cooperation:  Cooperative  Eye Contact:  Good  Psychomotor Behavior:  Appropriate  Affect:  Appropriate  Hopelessness:  Denies  Speech:  Normal  Thought Process:  Goal directed and Linear  Thought Content:  Normal  Suicidal:  None  Homicidal:  None  Hallucinations:  None  Delusion:  None  Memory:  Intact  Orientation:  Person, Place, Time, and Situation  Reliability:  fair  Insight:  Fair  Judgement:  Fair  Impulse Control:  Fair  Physical/Medical Issues:  No               Assessment & Plan   Diagnoses and all orders for this visit:    1. Generalized anxiety disorder  -     amitriptyline (ELAVIL) 50 MG tablet; Take 1 tablet by mouth every night at bedtime.  Dispense: 30 tablet; Refill: 1    2. Insomnia due to other mental disorder  -     amitriptyline (ELAVIL) 50 MG tablet; Take 1 tablet by mouth every night at bedtime.  Dispense: 30 tablet; Refill: 1    3. Major depressive disorder, recurrent episode, moderate  -     amitriptyline (ELAVIL) 50 MG tablet; Take 1 tablet by mouth every night at bedtime.  Dispense: 30 tablet; Refill: 1    4. Paranoid schizophrenia  -     OLANZapine (zyPREXA) 10 MG tablet; Take 1 tablet by mouth Every Night.  Dispense: 30 tablet; Refill: 1            Discussed medication options with patient. Cont. Amitriptyline 50 mg daily at night for anxiety, depression, and insomnia. Cont. Zyprexa 10 mg daily at night for schizophrenia. Reviewed the risks, benefits, and side effects of the medications; patient acknowledged and verbally consented. Patient was instructed on medication side effects, benefits, and also of no treatment.  Patient was given an explanation regarding potential for increased risk of diabetes, lipids, and weight gain.  Labs will be assessed as clinically indicated.  Diet was discussed especially healthy diet choices and increasing activity and exercise.  Patient was strongly urged to continue weight maintenance or weight loss efforts.  Patient reported verbalized understanding of instructions.  Patient is agreeable to call the office with any questions, concerns, or worsening of symptoms.  Patient is aware to call 911 or go to the nearest ER should begin having thoughts to harm self or others.        Follow up in two months        Errors in dictation may reflect use of voice recognition software and not all errors in transcription may have been detected prior to signing.              This document has been electronically signed by ADOLFO Barbour   March 7, 2025 09:15 EST

## 2025-06-12 ENCOUNTER — OFFICE VISIT (OUTPATIENT)
Dept: FAMILY MEDICINE CLINIC | Facility: CLINIC | Age: 55
End: 2025-06-12
Payer: MEDICARE

## 2025-06-12 VITALS
HEIGHT: 70 IN | SYSTOLIC BLOOD PRESSURE: 120 MMHG | RESPIRATION RATE: 14 BRPM | WEIGHT: 224 LBS | TEMPERATURE: 97.4 F | OXYGEN SATURATION: 96 % | DIASTOLIC BLOOD PRESSURE: 70 MMHG | HEART RATE: 81 BPM | BODY MASS INDEX: 32.07 KG/M2

## 2025-06-12 DIAGNOSIS — R73.03 PREDIABETES: Chronic | ICD-10-CM

## 2025-06-12 DIAGNOSIS — F32.A ANXIETY AND DEPRESSION: Chronic | ICD-10-CM

## 2025-06-12 DIAGNOSIS — I79.8 OTHER DISORDERS OF ARTERIES, ARTERIOLES AND CAPILLARIES IN DISEASES CLASSIFIED ELSEWHERE: Chronic | ICD-10-CM

## 2025-06-12 DIAGNOSIS — E55.9 VITAMIN D DEFICIENCY: Chronic | ICD-10-CM

## 2025-06-12 DIAGNOSIS — E78.5 DYSLIPIDEMIA: Chronic | ICD-10-CM

## 2025-06-12 DIAGNOSIS — Z00.00 MEDICARE ANNUAL WELLNESS VISIT, SUBSEQUENT: Primary | ICD-10-CM

## 2025-06-12 DIAGNOSIS — F41.9 ANXIETY AND DEPRESSION: Chronic | ICD-10-CM

## 2025-06-12 DIAGNOSIS — I10 ESSENTIAL HYPERTENSION: Chronic | ICD-10-CM

## 2025-06-12 DIAGNOSIS — F51.01 PRIMARY INSOMNIA: Chronic | ICD-10-CM

## 2025-06-12 DIAGNOSIS — R53.82 CHRONIC FATIGUE: Chronic | ICD-10-CM

## 2025-06-12 RX ORDER — LISINOPRIL 10 MG/1
10 TABLET ORAL DAILY
Qty: 90 TABLET | Refills: 0 | Status: SHIPPED | OUTPATIENT
Start: 2025-06-12

## 2025-06-12 RX ORDER — AMITRIPTYLINE HYDROCHLORIDE 50 MG/1
100 TABLET ORAL
Qty: 60 TABLET | Refills: 0 | Status: SHIPPED | OUTPATIENT
Start: 2025-06-12

## 2025-06-12 RX ORDER — ERGOCALCIFEROL 1.25 MG/1
50000 CAPSULE, LIQUID FILLED ORAL WEEKLY
Qty: 12 CAPSULE | Refills: 0 | Status: SHIPPED | OUTPATIENT
Start: 2025-06-12

## 2025-06-12 RX ORDER — ASPIRIN 81 MG/1
81 TABLET ORAL DAILY
Qty: 90 TABLET | Refills: 1 | Status: SHIPPED | OUTPATIENT
Start: 2025-06-12

## 2025-06-12 RX ORDER — SILDENAFIL CITRATE 20 MG/1
20-40 TABLET ORAL DAILY PRN
Qty: 60 TABLET | Refills: 0 | Status: SHIPPED | OUTPATIENT
Start: 2025-06-12

## 2025-06-12 RX ORDER — ATENOLOL 25 MG/1
25 TABLET ORAL DAILY
Qty: 90 TABLET | Refills: 0 | Status: SHIPPED | OUTPATIENT
Start: 2025-06-12

## 2025-06-12 RX ORDER — SIMVASTATIN 40 MG
40 TABLET ORAL NIGHTLY
Qty: 90 TABLET | Refills: 0 | Status: SHIPPED | OUTPATIENT
Start: 2025-06-12

## 2025-06-12 NOTE — PROGRESS NOTES
Medicare Wellness Visit    Alfonso Barnett is a 55 y.o. patient who presents for a Medicare Wellness Visit.    The following portions of the patient's history were reviewed and   updated as appropriate: allergies, current medications, past family history, past medical history, past social history, past surgical history, and problem list.    Compared to one year ago, the patient's physical   health is better.  Compared to one year ago, the patient's mental   health is better.    Recent Hospitalizations:  He was not admitted to the hospital during the last year.     Current Medical Providers:  Patient Care Team:  Aaron Guy APRN as PCP - General (Family Medicine)    Outpatient Medications Prior to Visit   Medication Sig Dispense Refill    amitriptyline (ELAVIL) 50 MG tablet Take 1 tablet by mouth every night at bedtime. 30 tablet 1    aspirin 81 MG EC tablet Take 1 tablet by mouth Daily. 90 tablet 1    atenolol (TENORMIN) 25 MG tablet Take 1 tablet by mouth Daily. 90 tablet 1    Blood Glucose Monitoring Suppl w/Device kit Use 1 kit Daily. 1 each 0    cetirizine (zyrTEC) 10 MG tablet Take 1 tablet by mouth Daily As Needed for Allergies. 0 30 tablet 0    glucose blood test strip Monitor daily 100 each 3    Lancets Micro Thin 33G misc Use 1 Device Daily. 100 each 3    lisinopril (PRINIVIL,ZESTRIL) 10 MG tablet Take 1 tablet by mouth Daily. 90 tablet 1    sildenafil (REVATIO) 20 MG tablet Take 1-2 tablets by mouth Daily As Needed (ED). 60 tablet 1    simvastatin (ZOCOR) 40 MG tablet Take 1 tablet by mouth Every Night. 90 tablet 1    vitamin D (ERGOCALCIFEROL) 1.25 MG (96274 UT) capsule capsule Take 1 capsule by mouth 1 (One) Time Per Week. 12 capsule 0     No facility-administered medications prior to visit.     No opioid medication identified on active medication list. I have reviewed chart for other potential  high risk medication/s and harmful drug interactions in the elderly.    Aspirin is on active  "medication list. Aspirin use is indicated based on review of current medical condition/s. Pros and cons of this therapy have been discussed today. Benefits of this medication outweigh potential harm.  Patient has been encouraged to continue taking this medication.  .      Patient Active Problem List   Diagnosis    Paranoid schizophrenia    Depression    Dyslipidemia    Essential hypertension    Anxiety disorder    Vitamin D deficiency    Drug-induced erectile dysfunction    Vision changes    Prostate disorder    High risk heterosexual behavior    Class 1 obesity in adult    Primary insomnia    Vitamin B 12 deficiency    Pain in both lower extremities    Blood glucose elevated    Prediabetes     Advance Care Planning Advance Directive is not on file.  ACP discussion was held with the patient during this visit. Patient does not have an advance directive, information provided.      Vitals:    06/12/25 1006   BP: 120/70   BP Location: Left arm   Patient Position: Sitting   Cuff Size: Adult   Pulse: 81   Resp: 14   Temp: 97.4 °F (36.3 °C)   TempSrc: Temporal   SpO2: 96%   Weight: 102 kg (224 lb)   Height: 177.8 cm (70\")   PainSc: 0-No pain     Estimated body mass index is 32.14 kg/m² as calculated from the following:    Height as of this encounter: 177.8 cm (70\").    Weight as of this encounter: 102 kg (224 lb).  Vision Screening    Right eye Left eye Both eyes   Without correction 20/25 20/25 20/25   With correction         Does the patient have evidence of cognitive impairment? No                                                                               Health  Risk Assessment    Smoking Status:  Social History     Tobacco Use   Smoking Status Never    Passive exposure: Never   Smokeless Tobacco Never     Alcohol Consumption:  Social History     Substance and Sexual Activity   Alcohol Use No       Fall Risk Screen  STEADI Fall Risk Assessment was completed, and patient is at LOW risk for falls.Assessment completed " on:2025    Depression Screening  Little interest or pleasure in doing things? Not at all   Feeling down, depressed, or hopeless? Not at all   PHQ-2 Total Score 0      Health Habits and Functional and Cognitive Screenin/12/2025    10:07 AM   Functional & Cognitive Status   Do you have difficulty preparing food and eating? No   Do you have difficulty bathing yourself, getting dressed or grooming yourself? No   Do you have difficulty using the toilet? No   Do you have difficulty moving around from place to place? No   Do you have trouble with steps or getting out of a bed or a chair? No   Current Diet Unhealthy Diet   Dental Exam Not up to date   Eye Exam Not up to date   Exercise (times per week) 7 times per week   Current Exercises Include Walking   Do you need help using the phone?  No   Are you deaf or do you have serious difficulty hearing?  No   Do you need help to go to places out of walking distance? No   Do you need help shopping? No   Do you need help preparing meals?  No   Do you need help with housework?  No   Do you need help with laundry? No   Do you need help taking your medications? No   Do you need help managing money? No   Do you ever drive or ride in a car without wearing a seat belt? No   Have you felt unusual stress, anger or loneliness in the last month? No   Who do you live with? Alone   If you need help, do you have trouble finding someone available to you? No   Have you been bothered in the last four weeks by sexual problems? No   Do you have difficulty concentrating, remembering or making decisions? No     Age-appropriate Screening Schedule:  Refer to the list below for future screening recommendations based on patient's age, sex and/or medical conditions. Orders for these recommended tests are listed in the plan section. The patient has been provided with a written plan.    Health Maintenance List  Health Maintenance   Topic Date Due    ZOSTER VACCINE (1 of 2) Never done     COVID-19 Vaccine (3 - 2024-25 season) 09/01/2024    ANNUAL WELLNESS VISIT  06/13/2026    INFLUENZA VACCINE  07/01/2025    PROSTATE CANCER SCREENING  01/21/2026    LIPID PANEL  01/21/2026    TDAP/TD VACCINES (2 - Td or Tdap) 05/01/2027    COLORECTAL CANCER SCREENING  11/22/2029    HEPATITIS C SCREENING  Completed    Pneumococcal Vaccine 50+  Completed    HEMOGLOBIN A1C  Discontinued    URINE MICROALBUMIN-CREATININE RATIO (uACR)  Discontinued                                                                                                                                                Risk Factors Identified During Encounter  Immunizations Discussed/Encouraged     The above risks/problems have been discussed with the patient.  Pertinent information has been shared with the patient in the After Visit Summary.  An After Visit Summary and PPPS were made available to the patient.    Follow Up:  Next Medicare Wellness visit to be scheduled in 1 year.     MORALES Hamilton is also being seen today for additional medical problem/s. which include hypertension, Dyslipidemia and vitamin deficiencies.      Hypertension  He is currently prescribed atenolol 25 mg and lisinopril 10 mg.  In addition, he is taking aspirin 81 mg daily with no concerns for bleeding or bruising.  He is tolerating these well.  Risk factors for coronary artery disease include dyslipidemia and male gender.   Lab Results   Component Value Date    GLUCOSE 125 (H) 01/21/2025    BUN 11 01/21/2025    CREATININE 1.00 01/21/2025     (L) 01/21/2025    K 4.8 01/21/2025     01/21/2025    CALCIUM 9.4 01/21/2025    PROTEINTOT 7.4 01/21/2025    ALBUMIN 4.4 01/21/2025    ALT 22 01/21/2025    AST 21 01/21/2025    ALKPHOS 88 01/21/2025    BILITOT 0.2 01/21/2025    GLOB 3.0 01/21/2025    AGRATIO 1.5 01/21/2025    BCR 11.0 01/21/2025    ANIONGAP 11.0 01/21/2025    EGFR 88.3 01/21/2025      Dyslipidemia  Prescribed simvastatin 40 mg.   Risk factors for coronary artery  "disease include dyslipidemia, hypertension and male sex.   Lab Results   Component Value Date    CHOL 264 (H) 01/21/2025    CHOL 268 (H) 09/04/2024    CHOL 216 (H) 01/12/2024     Lab Results   Component Value Date    TRIG 144 01/21/2025    TRIG 119 09/04/2024    TRIG 274 (H) 01/12/2024     Lab Results   Component Value Date    HDL 57 01/21/2025    HDL 59 09/04/2024    HDL 42 01/12/2024     Lab Results   Component Value Date     (H) 01/21/2025     (H) 09/04/2024     (H) 01/12/2024      Mental health issues  Stable and he has discontinued Zyrprexa    Shoulder Injury   The right shoulder is affected.  Much improved with treatment      Vital Signs:  /70 (BP Location: Left arm, Patient Position: Sitting, Cuff Size: Adult)   Pulse 81   Temp 97.4 °F (36.3 °C) (Temporal)   Resp 14   Ht 177.8 cm (70\")   Wt 102 kg (224 lb)   SpO2 96%   BMI 32.14 kg/m²     Physical Exam  Vitals and nursing note reviewed.   Constitutional:       General: He is not in acute distress.     Appearance: He is well-developed.   HENT:      Head: Normocephalic.      Nose: Nose normal.      Mouth/Throat:      Pharynx: No oropharyngeal exudate.   Eyes:      General:         Right eye: No discharge.         Left eye: No discharge.      Conjunctiva/sclera: Conjunctivae normal.      Pupils: Pupils are equal, round, and reactive to light.   Cardiovascular:      Rate and Rhythm: Normal rate and regular rhythm.      Heart sounds: Normal heart sounds. No murmur heard.     No friction rub.   Pulmonary:      Effort: Pulmonary effort is normal. No respiratory distress.      Breath sounds: Normal breath sounds. No wheezing or rales.   Musculoskeletal:      Right shoulder: Tenderness (Much improved) present.      Cervical back: Normal range of motion and neck supple.   Lymphadenopathy:      Head:      Right side of head: No tonsillar or preauricular adenopathy.      Left side of head: No tonsillar or preauricular adenopathy.      " Cervical: No cervical adenopathy.   Skin:     General: Skin is warm and dry.      Findings: No erythema or rash.   Neurological:      Mental Status: He is alert and oriented to person, place, and time.     Assessment and Plan   Diagnoses and all orders for this visit:    1. Medicare annual wellness visit, subsequent (Primary)  Comments:  Findings and recommendations discussed with Alfonso.    2. Essential hypertension  Comments:  Continue lisinopril and atenolol, weight loss recommended  Assessment & Plan:  Orders:  -     atenolol (TENORMIN) 25 MG tablet; Take 1 tablet by mouth Daily.  Dispense: 90 tablet; Refill: 0  -     lisinopril (PRINIVIL,ZESTRIL) 10 MG tablet; Take 1 tablet by mouth Daily.  Dispense: 90 tablet; Refill: 0    3. Dyslipidemia  Comments:  Continue simvastatin  Assessment & Plan:         Lipid Panel  Orders:  -     Lipid Panel  -     simvastatin (ZOCOR) 40 MG tablet; Take 1 tablet by mouth Every Night.  Dispense: 90 tablet; Refill: 0    4. Primary insomnia  Comments:  Continue amitriptyline  Assessment & Plan:  Orders:  -     amitriptyline (ELAVIL) 50 MG tablet; Take 2 tablets by mouth every night at bedtime.  Dispense: 60 tablet; Refill: 0    5. Anxiety and depression  Comments:  Continue amitriptyline  Orders:  -     amitriptyline (ELAVIL) 50 MG tablet; Take 2 tablets by mouth every night at bedtime.  Dispense: 60 tablet; Refill: 0    6. Prediabetes  Comments:  Treatment options discussed.  At this time he would like to continue  lifestyle changes including his nutrition plan  Assessment & Plan:  -     Comprehensive Metabolic Panel  -     Hemoglobin A1c  -     sildenafil (REVATIO) 20 MG tablet; Take 1-2 tablets by mouth Daily As Needed (ED).  Dispense: 60 tablet; Refill: 0    7. Vitamin D deficiency  Comments:  Vit d level reviewed  today.  vit D prescription sent to pharmacy  Assessment & Plan:   Orders:  -     Vitamin D,25-Hydroxy  -     vitamin D (ERGOCALCIFEROL) 1.25 MG (65441 UT) capsule  capsule; Take 1 capsule by mouth 1 (One) Time Per Week.  Dispense: 12 capsule; Refill: 0    8. Chronic fatigue  Comments:  Labs ordered  Orders:  -     CBC & Differential  -     TSH  -     Vitamin B12  -     Magnesium    9. 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  Dispense: 90 tablet; Refill: 1     Follow Up in July to review labs    Alfonso was given instructions and counseling regarding his condition or for health maintenance advice. Please see specific information pulled into the AVS if appropriate.

## 2025-06-13 LAB
25(OH)D3+25(OH)D2 SERPL-MCNC: 57.4 NG/ML (ref 30–100)
ALBUMIN SERPL-MCNC: 4.4 G/DL (ref 3.5–5.2)
ALBUMIN/GLOB SERPL: 2.3 G/DL
ALP SERPL-CCNC: 88 U/L (ref 39–117)
ALT SERPL-CCNC: 22 U/L (ref 1–41)
AST SERPL-CCNC: 25 U/L (ref 1–40)
BASOPHILS # BLD AUTO: 0.08 10*3/MM3 (ref 0–0.2)
BASOPHILS NFR BLD AUTO: 1 % (ref 0–1.5)
BILIRUB SERPL-MCNC: 0.3 MG/DL (ref 0–1.2)
BUN SERPL-MCNC: 14 MG/DL (ref 6–20)
BUN/CREAT SERPL: 13.2 (ref 7–25)
CALCIUM SERPL-MCNC: 9.1 MG/DL (ref 8.6–10.5)
CHLORIDE SERPL-SCNC: 104 MMOL/L (ref 98–107)
CHOLEST SERPL-MCNC: 192 MG/DL (ref 0–200)
CO2 SERPL-SCNC: 21.8 MMOL/L (ref 22–29)
CREAT SERPL-MCNC: 1.06 MG/DL (ref 0.76–1.27)
EGFRCR SERPLBLD CKD-EPI 2021: 81.9 ML/MIN/1.73
EOSINOPHIL # BLD AUTO: 0.32 10*3/MM3 (ref 0–0.4)
EOSINOPHIL NFR BLD AUTO: 3.9 % (ref 0.3–6.2)
ERYTHROCYTE [DISTWIDTH] IN BLOOD BY AUTOMATED COUNT: 12.5 % (ref 12.3–15.4)
GLOBULIN SER CALC-MCNC: 1.9 GM/DL
GLUCOSE SERPL-MCNC: 114 MG/DL (ref 65–99)
HBA1C MFR BLD: 5.9 % (ref 4.8–5.6)
HCT VFR BLD AUTO: 42.3 % (ref 37.5–51)
HDLC SERPL-MCNC: 49 MG/DL (ref 40–60)
HGB BLD-MCNC: 14.5 G/DL (ref 13–17.7)
IMM GRANULOCYTES # BLD AUTO: 0.02 10*3/MM3 (ref 0–0.05)
IMM GRANULOCYTES NFR BLD AUTO: 0.2 % (ref 0–0.5)
LDLC SERPL CALC-MCNC: 116 MG/DL (ref 0–100)
LYMPHOCYTES # BLD AUTO: 3.08 10*3/MM3 (ref 0.7–3.1)
LYMPHOCYTES NFR BLD AUTO: 37.8 % (ref 19.6–45.3)
MAGNESIUM SERPL-MCNC: 2.1 MG/DL (ref 1.6–2.6)
MCH RBC QN AUTO: 28.8 PG (ref 26.6–33)
MCHC RBC AUTO-ENTMCNC: 34.3 G/DL (ref 31.5–35.7)
MCV RBC AUTO: 84.1 FL (ref 79–97)
MONOCYTES # BLD AUTO: 0.79 10*3/MM3 (ref 0.1–0.9)
MONOCYTES NFR BLD AUTO: 9.7 % (ref 5–12)
NEUTROPHILS # BLD AUTO: 3.86 10*3/MM3 (ref 1.7–7)
NEUTROPHILS NFR BLD AUTO: 47.4 % (ref 42.7–76)
NRBC BLD AUTO-RTO: 0 /100 WBC (ref 0–0.2)
PLATELET # BLD AUTO: 332 10*3/MM3 (ref 140–450)
POTASSIUM SERPL-SCNC: 4.7 MMOL/L (ref 3.5–5.2)
PROT SERPL-MCNC: 6.3 G/DL (ref 6–8.5)
RBC # BLD AUTO: 5.03 10*6/MM3 (ref 4.14–5.8)
SODIUM SERPL-SCNC: 138 MMOL/L (ref 136–145)
TRIGL SERPL-MCNC: 151 MG/DL (ref 0–150)
TSH SERPL DL<=0.005 MIU/L-ACNC: 1.98 UIU/ML (ref 0.27–4.2)
VIT B12 SERPL-MCNC: 354 PG/ML (ref 211–946)
VLDLC SERPL CALC-MCNC: 27 MG/DL (ref 5–40)
WBC # BLD AUTO: 8.15 10*3/MM3 (ref 3.4–10.8)

## 2025-07-18 ENCOUNTER — TELEPHONE (OUTPATIENT)
Dept: FAMILY MEDICINE CLINIC | Facility: CLINIC | Age: 55
End: 2025-07-18
Payer: MEDICARE

## 2025-07-18 NOTE — TELEPHONE ENCOUNTER
Patient no showed his last appointment, he stated he had to [facility] his [facility]  [facility] because he almost blew his thumb off July 4th.    I got him rescheduled for the next available time on July 31 @ 11:45

## 2025-07-30 ENCOUNTER — TELEPHONE (OUTPATIENT)
Dept: FAMILY MEDICINE CLINIC | Facility: CLINIC | Age: 55
End: 2025-07-30
Payer: MEDICARE

## 2025-08-20 ENCOUNTER — TELEPHONE (OUTPATIENT)
Dept: FAMILY MEDICINE CLINIC | Facility: CLINIC | Age: 55
End: 2025-08-20
Payer: MEDICARE